# Patient Record
Sex: FEMALE | Race: WHITE | NOT HISPANIC OR LATINO | Employment: UNEMPLOYED | URBAN - METROPOLITAN AREA
[De-identification: names, ages, dates, MRNs, and addresses within clinical notes are randomized per-mention and may not be internally consistent; named-entity substitution may affect disease eponyms.]

---

## 2017-01-18 ENCOUNTER — OFFICE VISIT (OUTPATIENT)
Dept: FAMILY MEDICINE | Facility: CLINIC | Age: 37
End: 2017-01-18
Payer: COMMERCIAL

## 2017-01-18 VITALS
HEART RATE: 76 BPM | OXYGEN SATURATION: 99 % | HEIGHT: 64 IN | SYSTOLIC BLOOD PRESSURE: 104 MMHG | WEIGHT: 168 LBS | BODY MASS INDEX: 28.68 KG/M2 | DIASTOLIC BLOOD PRESSURE: 72 MMHG | RESPIRATION RATE: 16 BRPM

## 2017-01-18 DIAGNOSIS — Z12.4 CERVICAL CANCER SCREENING: ICD-10-CM

## 2017-01-18 DIAGNOSIS — F41.9 ANXIETY: Primary | ICD-10-CM

## 2017-01-18 DIAGNOSIS — F90.0 ADHD (ATTENTION DEFICIT HYPERACTIVITY DISORDER), INATTENTIVE TYPE: ICD-10-CM

## 2017-01-18 PROCEDURE — 99214 OFFICE O/P EST MOD 30 MIN: CPT | Mod: S$GLB,,, | Performed by: FAMILY MEDICINE

## 2017-01-18 PROCEDURE — 99999 PR PBB SHADOW E&M-EST. PATIENT-LVL IV: CPT | Mod: PBBFAC,,, | Performed by: FAMILY MEDICINE

## 2017-01-18 RX ORDER — DEXTROAMPHETAMINE SACCHARATE, AMPHETAMINE ASPARTATE, DEXTROAMPHETAMINE SULFATE AND AMPHETAMINE SULFATE 2.5; 2.5; 2.5; 2.5 MG/1; MG/1; MG/1; MG/1
1 TABLET ORAL 2 TIMES DAILY
Qty: 60 TABLET | Refills: 0 | Status: SHIPPED | OUTPATIENT
Start: 2017-01-18 | End: 2017-01-18 | Stop reason: SDUPTHER

## 2017-01-18 RX ORDER — ETONOGESTREL AND ETHINYL ESTRADIOL VAGINAL RING .015; .12 MG/D; MG/D
1 RING VAGINAL
Qty: 1 EACH | Refills: 6 | Status: SHIPPED | OUTPATIENT
Start: 2017-01-18 | End: 2017-10-16 | Stop reason: SDUPTHER

## 2017-01-18 RX ORDER — DEXTROAMPHETAMINE SACCHARATE, AMPHETAMINE ASPARTATE, DEXTROAMPHETAMINE SULFATE AND AMPHETAMINE SULFATE 2.5; 2.5; 2.5; 2.5 MG/1; MG/1; MG/1; MG/1
1 TABLET ORAL 2 TIMES DAILY
Qty: 60 TABLET | Refills: 0 | Status: SHIPPED | OUTPATIENT
Start: 2017-03-17 | End: 2017-07-14 | Stop reason: SDUPTHER

## 2017-01-18 RX ORDER — DEXTROAMPHETAMINE SACCHARATE, AMPHETAMINE ASPARTATE, DEXTROAMPHETAMINE SULFATE AND AMPHETAMINE SULFATE 2.5; 2.5; 2.5; 2.5 MG/1; MG/1; MG/1; MG/1
1 TABLET ORAL 2 TIMES DAILY
Qty: 60 TABLET | Refills: 0 | Status: SHIPPED | OUTPATIENT
Start: 2017-02-17 | End: 2017-01-18 | Stop reason: SDUPTHER

## 2017-01-18 NOTE — MR AVS SNAPSHOT
[facility]  [address] [place] [address]  Phone: [phone]  Fax: [phone]                  Judie Schmitz   2017 10:40 AM   Office Visit    Description:  Female : 1980   Provider:  Bertha Harrison MD   Department:  [facility]           Reason for Visit     Establish Care     Medication Refill           Diagnoses this Visit        Comments    Anxiety    -  Primary     ADHD (attention deficit hyperactivity disorder), inattentive type         Cervical cancer screening                To Do List           Goals (5 Years of Data)     None      Follow-Up and Disposition     Return in about 3 months (around 2017).       These Medications        Disp Refills Start End    etonogestrel-ethinyl estradiol (NUVARING) 0.12-0.015 mg/24 hr vaginal ring 1 each 6 2017     Place 1 each vaginally every 28 days. - Vaginal    Pharmacy: [facility]/pharmacy #[id number] - [place] - [address] AT [facility] Ph #: [phone]       dextroamphetamine-amphetamine 10 mg Tab 60 tablet 0 3/17/2017     Take 1 tablet by mouth 2 (two) times daily. - Oral    Pharmacy: [facility]/pharmacy #[id number] - [place] - [address] AT [facility] Ph #: [phone]         [facility] On [facility]     [facility] On Call Nurse Care Line -  Assistance  Registered nurses in the [facility] provide clinical advisement, health education, appointment booking, and other advisory services.  Call for this free service at [phone].             Medications           CHANGE how you are taking these medications     Start Taking Instead of    etonogestrel-ethinyl estradiol (NUVARING) 0.12-0.015 mg/24 hr vaginal ring NUVARING 0.12-0.015 mg/24 hr vaginal ring    Dosage:  Place 1 each vaginally every 28 days.     Reason for Change:  Reorder            Verify that the below list of medications is an accurate representation of the medications you are currently  "taking.  If none reported, the list may be blank. If incorrect, please contact your healthcare provider. Carry this list with you in case of emergency.           Current Medications     amitriptyline (ELAVIL) 10 MG tablet Take 10 mg by mouth nightly as needed for Insomnia (1-2 tablets every night).     clonazePAM (KLONOPIN) 0.5 MG tablet Take 0.5 mg by mouth 2 (two) times daily.     dextroamphetamine-amphetamine 10 mg Tab Starting on Mar 17, 2017. Take 1 tablet by mouth 2 (two) times daily.    etonogestrel-ethinyl estradiol (NUVARING) 0.12-0.015 mg/24 hr vaginal ring Place 1 each vaginally every 28 days.           Clinical Reference Information           Vital Signs - Last Recorded  Most recent update: 1/18/2017 10:49 AM by Christi Bardales LPN    BP Pulse Resp Ht Wt SpO2    104/72 (BP Location: Right arm, Patient Position: Sitting, BP Method: Manual) 76 16 5' 4" (1.626 m) 76.2 kg (167 lb 15.9 oz) 99%    BMI                28.84 kg/m2          Blood Pressure          Most Recent Value    BP  104/72      Allergies as of 1/18/2017     No Known Allergies      Immunizations Administered on Date of Encounter - 1/18/2017     None      Orders Placed During Today's Visit      Normal Orders This Visit    Ambulatory referral to Obstetrics / Gynecology       "

## 2017-01-18 NOTE — PROGRESS NOTES
Subjective:       Patient ID: Judie Schmitz is a 36 y.o. female.    Chief Complaint: Establish Care and Medication Refill    HPI 36 year old female here to establish care. She has ADD which was diagnosed at the age of 19 by Dr.Dale Denny. Patient takes adderall 10 mg every morning, and 10 mg in the afternoon. Patient was off of adderall during pregnancy and afterwards, cumulating to 2 years total. Patient finds the medication helps keep her day organized and stick to one task at a time.   Anxiety: patient takes klonopin prn for insomnia and anxiety. Patient had klonopin last filled on 6/29/16. She states her issues with sleeping is staying asleep, not falling asleep. She states when she wakes up in the middle of the night she can be up for 1-2 hours. This is a chronic problem. Klonopin and elavil have helped prn. Patient describes panic attacks that are relieved with klonopin. Patient states her anxiety has decreased and she rarely needs medication.   Patient needs an ob/gyn referral. Previous ob/gyn was  at [facility]. Patient has a history of abnormal pap smears which was monitored and normalized after last pregnancy. Patients contraception is nuvaring.     Review of Systems   Constitutional: Negative for chills and fever.   HENT: Negative for congestion and postnasal drip.    Respiratory: Negative for chest tightness and shortness of breath.    Cardiovascular: Negative for chest pain and leg swelling.   Gastrointestinal: Negative for abdominal pain, diarrhea, nausea and vomiting.   Genitourinary: Negative for dysuria and hematuria.   Musculoskeletal: Negative for arthralgias and back pain.   Neurological: Negative for weakness and headaches.   Psychiatric/Behavioral: Positive for decreased concentration and sleep disturbance. Negative for agitation, behavioral problems, self-injury and suicidal ideas. The patient is nervous/anxious.        Objective:      Vitals:    01/18/17 1047   BP: 104/72   Pulse:  76   Resp: 16     Physical Exam   Constitutional: She is oriented to person, place, and time. She appears well-developed and well-nourished.   HENT:   Head: Normocephalic and atraumatic.   Mouth/Throat: Oropharynx is clear and moist. No oropharyngeal exudate.   Eyes: EOM are normal. Right eye exhibits no discharge. Left eye exhibits no discharge.   Neck: Normal range of motion.   Cardiovascular: Normal rate and regular rhythm.    Pulmonary/Chest: Effort normal. She has no wheezes.   Abdominal: Soft. There is no tenderness. There is no rebound and no guarding.   Musculoskeletal: She exhibits no edema or tenderness.   Lymphadenopathy:     She has no cervical adenopathy.   Neurological: She is alert and oriented to person, place, and time.   Psychiatric: She has a normal mood and affect. Her behavior is normal.   Vitals reviewed.      Assessment:       1. Anxiety    2. ADHD (attention deficit hyperactivity disorder), inattentive type    3. Cervical cancer screening        Plan:         1. ADD: adderall last filled on 8/30/16, patient is compliant. Continue adderall 10 mg twice daily. 3 months filled.   2. Anxiety and insomnia: advised on melatonin to aid with sleep. Proper sleep hygiene reviewed with patient. Daily exercise but no strenuous activity 3 hours before going to sleep. For anxiety, advised on counseling and to avoid klonopin.   3. Screening: referral for ob/gyn placed. nuva ring for OCP. Immunizations: Flu shot and tdap UTD.   4. RTC 3 months.   Anxiety    ADHD (attention deficit hyperactivity disorder), inattentive type  -     Discontinue: dextroamphetamine-amphetamine 10 mg Tab; Take 1 tablet by mouth 2 (two) times daily.  Dispense: 60 tablet; Refill: 0  -     Discontinue: dextroamphetamine-amphetamine 10 mg Tab; Take 1 tablet by mouth 2 (two) times daily.  Dispense: 60 tablet; Refill: 0  -     dextroamphetamine-amphetamine 10 mg Tab; Take 1 tablet by mouth 2 (two) times daily.  Dispense: 60 tablet;  Refill: 0    Cervical cancer screening  -     Ambulatory referral to Obstetrics / Gynecology    Other orders  -     etonogestrel-ethinyl estradiol (NUVARING) 0.12-0.015 mg/24 hr vaginal ring; Place 1 each vaginally every 28 days.  Dispense: 1 each; Refill: 6      Return in about 3 months (around 4/18/2017).

## 2017-07-14 ENCOUNTER — OFFICE VISIT (OUTPATIENT)
Dept: FAMILY MEDICINE | Facility: CLINIC | Age: 37
End: 2017-07-14
Payer: COMMERCIAL

## 2017-07-14 VITALS
WEIGHT: 173.19 LBS | DIASTOLIC BLOOD PRESSURE: 86 MMHG | HEART RATE: 92 BPM | SYSTOLIC BLOOD PRESSURE: 118 MMHG | RESPIRATION RATE: 16 BRPM | OXYGEN SATURATION: 100 % | BODY MASS INDEX: 29.57 KG/M2 | HEIGHT: 64 IN

## 2017-07-14 DIAGNOSIS — R07.81 RIB PAIN ON LEFT SIDE: Primary | ICD-10-CM

## 2017-07-14 DIAGNOSIS — E66.3 OVERWEIGHT (BMI 25.0-29.9): ICD-10-CM

## 2017-07-14 DIAGNOSIS — G47.00 INSOMNIA, UNSPECIFIED TYPE: ICD-10-CM

## 2017-07-14 DIAGNOSIS — F90.0 ADHD (ATTENTION DEFICIT HYPERACTIVITY DISORDER), INATTENTIVE TYPE: ICD-10-CM

## 2017-07-14 PROCEDURE — 99999 PR PBB SHADOW E&M-EST. PATIENT-LVL III: CPT | Mod: PBBFAC,,, | Performed by: FAMILY MEDICINE

## 2017-07-14 PROCEDURE — 99214 OFFICE O/P EST MOD 30 MIN: CPT | Mod: S$GLB,,, | Performed by: FAMILY MEDICINE

## 2017-07-14 RX ORDER — CYCLOBENZAPRINE HCL 10 MG
10 TABLET ORAL NIGHTLY PRN
Qty: 30 TABLET | Refills: 0 | Status: SHIPPED | OUTPATIENT
Start: 2017-07-14 | End: 2017-08-13 | Stop reason: SDUPTHER

## 2017-07-14 RX ORDER — DEXTROAMPHETAMINE SACCHARATE, AMPHETAMINE ASPARTATE, DEXTROAMPHETAMINE SULFATE AND AMPHETAMINE SULFATE 2.5; 2.5; 2.5; 2.5 MG/1; MG/1; MG/1; MG/1
1 TABLET ORAL 2 TIMES DAILY
Qty: 60 TABLET | Refills: 0 | Status: SHIPPED | OUTPATIENT
Start: 2017-09-14 | End: 2017-10-19

## 2017-07-14 RX ORDER — QUETIAPINE FUMARATE 25 MG/1
25 TABLET, FILM COATED ORAL NIGHTLY
Qty: 30 TABLET | Refills: 0 | Status: SHIPPED | OUTPATIENT
Start: 2017-07-14 | End: 2017-08-13 | Stop reason: SDUPTHER

## 2017-07-14 RX ORDER — DEXTROAMPHETAMINE SACCHARATE, AMPHETAMINE ASPARTATE, DEXTROAMPHETAMINE SULFATE AND AMPHETAMINE SULFATE 2.5; 2.5; 2.5; 2.5 MG/1; MG/1; MG/1; MG/1
1 TABLET ORAL 2 TIMES DAILY
Qty: 60 TABLET | Refills: 0 | Status: SHIPPED | OUTPATIENT
Start: 2017-08-14 | End: 2017-07-14 | Stop reason: SDUPTHER

## 2017-07-14 RX ORDER — DEXTROAMPHETAMINE SACCHARATE, AMPHETAMINE ASPARTATE, DEXTROAMPHETAMINE SULFATE AND AMPHETAMINE SULFATE 2.5; 2.5; 2.5; 2.5 MG/1; MG/1; MG/1; MG/1
1 TABLET ORAL 2 TIMES DAILY
Qty: 60 TABLET | Refills: 0 | Status: SHIPPED | OUTPATIENT
Start: 2017-07-14 | End: 2017-07-14 | Stop reason: SDUPTHER

## 2017-07-14 RX ORDER — NAPROXEN 500 MG/1
500 TABLET ORAL 2 TIMES DAILY
Qty: 28 TABLET | Refills: 0 | Status: SHIPPED | OUTPATIENT
Start: 2017-07-14 | End: 2017-07-28

## 2017-07-14 NOTE — PROGRESS NOTES
Subjective:       Patient ID: Judie Schmitz is a 37 y.o. female.    Chief Complaint: Medication Refill and Rib Pain    HPI 37 year old female here for medication refill on adderall and left rib pain suffered on July 4th.   ADHD: patient states symptoms are stable on adderall. She denies new issues with sleep/cp/sob/palptiations. She takes it twice daily when at work and finds she is able to concentrate with her work load, and perform tasks effectively.   Rib pain: patient states she turned to get a btotle from daughter in the car and she felt something pop. since then she has been in 8/10 pain and has been taking ibuprofen 800 mg TID which controls it. She states the pain is not improving. She denies SOB.   Insomnia: chronic. Patient has noticed difficulty staying asleep. She states she is a light sleeper and it takes an hour to go back to sleep. She has tried melatonin, trazodone which were ineffective. Elavil makes her feel drowsy in the am.   Review of Systems   Constitutional: Negative for chills and fever.   Respiratory: Negative for chest tightness and shortness of breath.    Cardiovascular: Positive for chest pain. Negative for leg swelling.   Gastrointestinal: Negative for abdominal pain, diarrhea, nausea and vomiting.   Psychiatric/Behavioral: Negative for decreased concentration.       Objective:      Vitals:    07/14/17 1004   BP: 118/86   Pulse: 92   Resp: 16     Physical Exam   Constitutional: She appears well-developed and well-nourished. No distress.   Cardiovascular: Normal rate and regular rhythm.    Pulmonary/Chest: Effort normal. She has no wheezes. She exhibits tenderness and bony tenderness. She exhibits no crepitus, no edema and no swelling.       Abdominal: Soft. There is no tenderness. There is no guarding.   Skin: She is not diaphoretic.   Vitals reviewed.      Assessment:       1. Rib pain on left side    2. ADHD (attention deficit hyperactivity disorder), inattentive type    3.  Insomnia, unspecified type        Plan:         1. ADHD: refilled 3 months of medication. Patient is stable and compliant with medication  2. Insomnia: counseled on proper sleep hygiene. Will start seroquel.   3. Rib pain: likely contusion vs intercostal muscle strain vs costochondritis. Will start aleve, and flexeril. If symptoms do not improve will get a cxr. Patient to update me on how her symptoms are in 2-3 weeks.   For weight loss, patient advised on decreasing sugar intake and 150 min of exercise/week.     Rib pain on left side    ADHD (attention deficit hyperactivity disorder), inattentive type  -     Discontinue: dextroamphetamine-amphetamine 10 mg Tab; Take 1 tablet by mouth 2 (two) times daily.  Dispense: 60 tablet; Refill: 0  -     Discontinue: dextroamphetamine-amphetamine 10 mg Tab; Take 1 tablet by mouth 2 (two) times daily.  Dispense: 60 tablet; Refill: 0  -     dextroamphetamine-amphetamine 10 mg Tab; Take 1 tablet by mouth 2 (two) times daily.  Dispense: 60 tablet; Refill: 0    Insomnia, unspecified type    Other orders  -     cyclobenzaprine (FLEXERIL) 10 MG tablet; Take 1 tablet (10 mg total) by mouth nightly as needed for Muscle spasms.  Dispense: 30 tablet; Refill: 0  -     quetiapine (SEROQUEL) 25 MG Tab; Take 1 tablet (25 mg total) by mouth every evening.  Dispense: 30 tablet; Refill: 0  -     naproxen (NAPROSYN) 500 MG tablet; Take 1 tablet (500 mg total) by mouth 2 (two) times daily.  Dispense: 28 tablet; Refill: 0      Return in about 3 months (around 10/14/2017).

## 2017-08-14 RX ORDER — QUETIAPINE FUMARATE 25 MG/1
25 TABLET, FILM COATED ORAL NIGHTLY
Qty: 30 TABLET | Refills: 0 | Status: SHIPPED | OUTPATIENT
Start: 2017-08-14 | End: 2017-10-31

## 2017-08-14 RX ORDER — CYCLOBENZAPRINE HCL 10 MG
10 TABLET ORAL NIGHTLY PRN
Qty: 30 TABLET | Refills: 0 | Status: SHIPPED | OUTPATIENT
Start: 2017-08-14 | End: 2017-09-22 | Stop reason: SDUPTHER

## 2017-09-22 RX ORDER — CYCLOBENZAPRINE HCL 10 MG
10 TABLET ORAL NIGHTLY PRN
Qty: 30 TABLET | Refills: 0 | Status: SHIPPED | OUTPATIENT
Start: 2017-09-22 | End: 2017-10-02

## 2017-10-16 RX ORDER — ETONOGESTREL AND ETHINYL ESTRADIOL .12; .015 MG/D; MG/D
INSERT, EXTENDED RELEASE VAGINAL
Qty: 4 EACH | Refills: 6 | Status: SHIPPED | OUTPATIENT
Start: 2017-10-16 | End: 2017-10-31 | Stop reason: SDUPTHER

## 2017-10-19 ENCOUNTER — OFFICE VISIT (OUTPATIENT)
Dept: FAMILY MEDICINE | Facility: CLINIC | Age: 37
End: 2017-10-19
Payer: COMMERCIAL

## 2017-10-19 VITALS
HEIGHT: 64 IN | OXYGEN SATURATION: 99 % | HEART RATE: 85 BPM | WEIGHT: 178.81 LBS | SYSTOLIC BLOOD PRESSURE: 122 MMHG | DIASTOLIC BLOOD PRESSURE: 86 MMHG | BODY MASS INDEX: 30.53 KG/M2

## 2017-10-19 DIAGNOSIS — F90.0 ADHD (ATTENTION DEFICIT HYPERACTIVITY DISORDER), INATTENTIVE TYPE: Primary | ICD-10-CM

## 2017-10-19 DIAGNOSIS — E66.9 OBESITY (BMI 30-39.9): ICD-10-CM

## 2017-10-19 PROCEDURE — 99999 PR PBB SHADOW E&M-EST. PATIENT-LVL III: CPT | Mod: PBBFAC,,, | Performed by: FAMILY MEDICINE

## 2017-10-19 PROCEDURE — 99213 OFFICE O/P EST LOW 20 MIN: CPT | Mod: S$GLB,,, | Performed by: FAMILY MEDICINE

## 2017-10-19 RX ORDER — DEXTROAMPHETAMINE SACCHARATE, AMPHETAMINE ASPARTATE, DEXTROAMPHETAMINE SULFATE AND AMPHETAMINE SULFATE 3.75; 3.75; 3.75; 3.75 MG/1; MG/1; MG/1; MG/1
15 TABLET ORAL 2 TIMES DAILY
Qty: 60 TABLET | Refills: 0 | Status: SHIPPED | OUTPATIENT
Start: 2017-12-20 | End: 2018-02-21 | Stop reason: DRUGHIGH

## 2017-10-19 RX ORDER — DEXTROAMPHETAMINE SACCHARATE, AMPHETAMINE ASPARTATE, DEXTROAMPHETAMINE SULFATE AND AMPHETAMINE SULFATE 3.75; 3.75; 3.75; 3.75 MG/1; MG/1; MG/1; MG/1
15 TABLET ORAL 2 TIMES DAILY
Qty: 60 TABLET | Refills: 0 | Status: SHIPPED | OUTPATIENT
Start: 2017-11-20 | End: 2017-10-19 | Stop reason: SDUPTHER

## 2017-10-19 RX ORDER — DEXTROAMPHETAMINE SACCHARATE, AMPHETAMINE ASPARTATE, DEXTROAMPHETAMINE SULFATE AND AMPHETAMINE SULFATE 3.75; 3.75; 3.75; 3.75 MG/1; MG/1; MG/1; MG/1
15 TABLET ORAL 2 TIMES DAILY
Qty: 60 TABLET | Refills: 0 | Status: SHIPPED | OUTPATIENT
Start: 2017-10-19 | End: 2017-10-19 | Stop reason: SDUPTHER

## 2017-10-19 NOTE — PROGRESS NOTES
Subjective:       Patient ID: Judie Schmitz is a 37 y.o. female.    Chief Complaint: Medication Refill    HPI 37 year old female here for adderall refills. Patient states she has noticed the 10 mg twice a day has not been effective in helping her perform her duties at her job effectively. She has tried taking 15 mg in the am and 10 in the afternoon which has been effective.  She denies issues with sleeping, palpitations, chest pains.   Review of Systems   Constitutional: Negative for chills and fever.   Respiratory: Negative for chest tightness and shortness of breath.    Cardiovascular: Negative for chest pain, palpitations and leg swelling.   Gastrointestinal: Negative for abdominal pain, diarrhea, nausea and vomiting.   Genitourinary: Negative for dysuria and menstrual problem.   Psychiatric/Behavioral: Negative for sleep disturbance.       Objective:      Vitals:    10/19/17 0810   BP: 122/86   Pulse: 85     Physical Exam   Constitutional: She is oriented to person, place, and time. She appears well-developed and well-nourished. No distress.   Cardiovascular: Normal rate and regular rhythm.    Pulmonary/Chest: Effort normal. She has no wheezes.   Abdominal: Soft. There is no tenderness. There is no guarding.   Neurological: She is alert and oriented to person, place, and time.   Skin: She is not diaphoretic.   Psychiatric: She has a normal mood and affect. Her behavior is normal. Judgment and thought content normal.   Vitals reviewed.      Assessment:       1. ADHD (attention deficit hyperactivity disorder), inattentive type    2. Obesity (BMI 30-39.9)        Plan:         1. ADHD: will increase adderall to 15 mg Twice daily. Patient was advised to take lowest effective dose to control her symptoms of decreased attention span and disorganization.   2. Screening: pap per gyn in 11/2017. Flu shot given at work  3. Obesity: patient advised on calorie counting and continuing daily exercise.     ADHD (attention  deficit hyperactivity disorder), inattentive type    Obesity (BMI 30-39.9)    Other orders  -     Discontinue: dextroamphetamine-amphetamine (ADDERALL) 15 mg tablet; Take 1 tablet (15 mg total) by mouth 2 (two) times daily.  Dispense: 60 tablet; Refill: 0  -     Discontinue: dextroamphetamine-amphetamine (ADDERALL) 15 mg tablet; Take 1 tablet (15 mg total) by mouth 2 (two) times daily.  Dispense: 60 tablet; Refill: 0  -     dextroamphetamine-amphetamine (ADDERALL) 15 mg tablet; Take 1 tablet (15 mg total) by mouth 2 (two) times daily.  Dispense: 60 tablet; Refill: 0      Return in about 3 months (around 1/19/2018).

## 2017-10-31 ENCOUNTER — OFFICE VISIT (OUTPATIENT)
Dept: OBSTETRICS AND GYNECOLOGY | Facility: CLINIC | Age: 37
End: 2017-10-31
Payer: COMMERCIAL

## 2017-10-31 VITALS
DIASTOLIC BLOOD PRESSURE: 64 MMHG | HEIGHT: 64 IN | WEIGHT: 180 LBS | SYSTOLIC BLOOD PRESSURE: 118 MMHG | BODY MASS INDEX: 30.73 KG/M2

## 2017-10-31 DIAGNOSIS — Z01.419 ENCOUNTER FOR GYNECOLOGICAL EXAMINATION WITHOUT ABNORMAL FINDING: Primary | ICD-10-CM

## 2017-10-31 DIAGNOSIS — Z12.4 CERVICAL CANCER SCREENING: ICD-10-CM

## 2017-10-31 DIAGNOSIS — Z30.44 ENCOUNTER FOR SURVEILLANCE OF VAGINAL RING HORMONAL CONTRACEPTIVE DEVICE: ICD-10-CM

## 2017-10-31 PROCEDURE — 88175 CYTOPATH C/V AUTO FLUID REDO: CPT

## 2017-10-31 PROCEDURE — 99385 PREV VISIT NEW AGE 18-39: CPT | Mod: S$GLB,,, | Performed by: OBSTETRICS & GYNECOLOGY

## 2017-10-31 PROCEDURE — 99999 PR PBB SHADOW E&M-EST. PATIENT-LVL III: CPT | Mod: PBBFAC,,, | Performed by: OBSTETRICS & GYNECOLOGY

## 2017-10-31 RX ORDER — ETONOGESTREL AND ETHINYL ESTRADIOL VAGINAL RING .015; .12 MG/D; MG/D
RING VAGINAL
Qty: 3 EACH | Refills: 3 | Status: SHIPPED | OUTPATIENT
Start: 2017-10-31 | End: 2019-05-29 | Stop reason: SDUPTHER

## 2017-10-31 NOTE — PROGRESS NOTES
Subjective:       Patient ID: Judie Schmitz is a 37 y.o. female.    Chief Complaint:  Well Woman      History of Present Illness  Patient presents for annual exam.  Patient is using NuvaRing for birth control and would like to continue.  She is without GYN complaints.    Menstrual History:  OB History      Para Term  AB Living    2 2       2    SAB TAB Ectopic Multiple Live Births                      Menarche age:   Patient's last menstrual period was 2017 (exact date).         Review of Systems  Review of Systems   Constitutional: Negative for activity change, appetite change, chills, diaphoresis, fatigue, fever and unexpected weight change.   HENT: Negative for congestion, dental problem, drooling, ear discharge, ear pain, facial swelling, hearing loss, mouth sores, nosebleeds, postnasal drip, rhinorrhea, sinus pressure, sneezing, sore throat, tinnitus, trouble swallowing and voice change.    Eyes: Negative for photophobia, pain, discharge, redness, itching and visual disturbance.   Respiratory: Negative for apnea, cough, choking, chest tightness, shortness of breath, wheezing and stridor.    Cardiovascular: Negative for chest pain, palpitations and leg swelling.   Gastrointestinal: Negative for abdominal distention, abdominal pain, anal bleeding, blood in stool, constipation, diarrhea, nausea, rectal pain and vomiting.   Endocrine: Negative for cold intolerance, heat intolerance, polydipsia, polyphagia and polyuria.   Genitourinary: Negative for decreased urine volume, difficulty urinating, dyspareunia, dysuria, enuresis, flank pain, frequency, genital sores, hematuria, menstrual problem, pelvic pain, urgency, vaginal bleeding, vaginal discharge and vaginal pain.   Musculoskeletal: Negative for arthralgias, back pain, gait problem, joint swelling, myalgias, neck pain and neck stiffness.   Skin: Negative for color change, pallor, rash and wound.   Allergic/Immunologic: Negative for  environmental allergies, food allergies and immunocompromised state.   Neurological: Negative for dizziness, tremors, seizures, syncope, facial asymmetry, speech difficulty, weakness, light-headedness, numbness and headaches.   Hematological: Negative for adenopathy. Does not bruise/bleed easily.   Psychiatric/Behavioral: Negative for agitation, behavioral problems, confusion, decreased concentration, dysphoric mood, hallucinations, self-injury, sleep disturbance and suicidal ideas. The patient is not nervous/anxious and is not hyperactive.            Objective:    Physical Exam   Constitutional: She is oriented to person, place, and time. She appears well-developed and well-nourished.   Neck: No thyromegaly present.   Cardiovascular: Normal rate and regular rhythm.    Pulmonary/Chest: Effort normal and breath sounds normal. Right breast exhibits no inverted nipple, no mass, no nipple discharge, no skin change and no tenderness. Left breast exhibits no inverted nipple, no mass, no nipple discharge, no skin change and no tenderness. Breasts are symmetrical.   Abdominal: Soft. Bowel sounds are normal. She exhibits no mass. There is no tenderness. Hernia confirmed negative in the right inguinal area and confirmed negative in the left inguinal area.   Genitourinary: Vagina normal and uterus normal. Rectal exam shows no external hemorrhoid. No breast tenderness or discharge. Uterus is not enlarged and not tender. Cervix exhibits no motion tenderness, no discharge and no friability. Right adnexum displays no mass, no tenderness and no fullness. Left adnexum displays no mass, no tenderness and no fullness. No tenderness in the vagina. No foreign body in the vagina. No vaginal discharge found.   Musculoskeletal: Normal range of motion.   Lymphadenopathy:        Right: No inguinal adenopathy present.        Left: No inguinal adenopathy present.   Neurological: She is alert and oriented to person, place, and time. She has  normal reflexes.   Skin: Skin is dry.   Psychiatric: She has a normal mood and affect. Her behavior is normal. Judgment and thought content normal.   Nursing note and vitals reviewed.        Assessment:        1. Cervical cancer screening    2. Encounter for gynecological examination without abnormal finding    3. Encounter for surveillance of vaginal ring hormonal contraceptive device               Plan:        Judie was seen today for well woman.    Diagnoses and all orders for this visit:    Cervical cancer screening  -     Liquid-based pap smear, screening    Encounter for gynecological examination without abnormal finding    Encounter for surveillance of vaginal ring hormonal contraceptive device

## 2017-11-15 ENCOUNTER — PATIENT MESSAGE (OUTPATIENT)
Dept: FAMILY MEDICINE | Facility: CLINIC | Age: 37
End: 2017-11-15

## 2017-11-16 DIAGNOSIS — Z13.220 SCREENING FOR LIPID DISORDERS: ICD-10-CM

## 2017-11-16 DIAGNOSIS — R63.5 WEIGHT GAIN: ICD-10-CM

## 2017-11-16 DIAGNOSIS — R53.83 FATIGUE, UNSPECIFIED TYPE: Primary | ICD-10-CM

## 2018-02-14 DIAGNOSIS — Z13.0 SCREENING FOR DEFICIENCY ANEMIA: Primary | ICD-10-CM

## 2018-02-14 DIAGNOSIS — Z13.1 DIABETES MELLITUS SCREENING: ICD-10-CM

## 2018-02-14 DIAGNOSIS — Z13.220 SCREENING CHOLESTEROL LEVEL: ICD-10-CM

## 2018-02-14 DIAGNOSIS — Z13.29 THYROID DISORDER SCREEN: ICD-10-CM

## 2018-02-19 PROBLEM — Z13.220 SCREENING FOR LIPID DISORDERS: Status: RESOLVED | Noted: 2017-11-16 | Resolved: 2018-02-19

## 2018-02-21 ENCOUNTER — OFFICE VISIT (OUTPATIENT)
Dept: FAMILY MEDICINE | Facility: CLINIC | Age: 38
End: 2018-02-21
Payer: COMMERCIAL

## 2018-02-21 VITALS
BODY MASS INDEX: 31.05 KG/M2 | SYSTOLIC BLOOD PRESSURE: 120 MMHG | DIASTOLIC BLOOD PRESSURE: 76 MMHG | TEMPERATURE: 98 F | HEART RATE: 88 BPM | HEIGHT: 64 IN | OXYGEN SATURATION: 100 % | WEIGHT: 181.88 LBS

## 2018-02-21 DIAGNOSIS — Z00.00 ANNUAL PHYSICAL EXAM: Primary | ICD-10-CM

## 2018-02-21 DIAGNOSIS — F90.0 ADHD (ATTENTION DEFICIT HYPERACTIVITY DISORDER), INATTENTIVE TYPE: ICD-10-CM

## 2018-02-21 DIAGNOSIS — G47.00 INSOMNIA, UNSPECIFIED TYPE: ICD-10-CM

## 2018-02-21 DIAGNOSIS — M54.50 CHRONIC MIDLINE LOW BACK PAIN WITHOUT SCIATICA: ICD-10-CM

## 2018-02-21 DIAGNOSIS — G89.29 CHRONIC MIDLINE LOW BACK PAIN WITHOUT SCIATICA: ICD-10-CM

## 2018-02-21 PROBLEM — R07.81 RIB PAIN ON LEFT SIDE: Status: RESOLVED | Noted: 2017-07-14 | Resolved: 2018-02-21

## 2018-02-21 PROCEDURE — 99999 PR PBB SHADOW E&M-EST. PATIENT-LVL IV: CPT | Mod: PBBFAC,,, | Performed by: NURSE PRACTITIONER

## 2018-02-21 PROCEDURE — 99395 PREV VISIT EST AGE 18-39: CPT | Mod: S$GLB,,, | Performed by: NURSE PRACTITIONER

## 2018-02-21 RX ORDER — ETODOLAC 400 MG/1
400 TABLET, FILM COATED ORAL 2 TIMES DAILY
Qty: 60 TABLET | Refills: 2 | Status: SHIPPED | OUTPATIENT
Start: 2018-02-21 | End: 2018-02-23

## 2018-02-21 RX ORDER — DEXTROAMPHETAMINE SACCHARATE, AMPHETAMINE ASPARTATE, DEXTROAMPHETAMINE SULFATE AND AMPHETAMINE SULFATE 5; 5; 5; 5 MG/1; MG/1; MG/1; MG/1
TABLET ORAL
Qty: 45 TABLET | Refills: 0 | Status: SHIPPED | OUTPATIENT
Start: 2018-02-21 | End: 2018-03-22 | Stop reason: SDUPTHER

## 2018-02-21 RX ORDER — CYCLOBENZAPRINE HCL 10 MG
10 TABLET ORAL 3 TIMES DAILY PRN
COMMUNITY
End: 2018-02-22 | Stop reason: SDUPTHER

## 2018-02-22 ENCOUNTER — PATIENT MESSAGE (OUTPATIENT)
Dept: FAMILY MEDICINE | Facility: CLINIC | Age: 38
End: 2018-02-22

## 2018-02-22 RX ORDER — CYCLOBENZAPRINE HCL 10 MG
10 TABLET ORAL 3 TIMES DAILY PRN
Qty: 90 TABLET | Refills: 1 | Status: SHIPPED | OUTPATIENT
Start: 2018-02-22 | End: 2019-11-05

## 2018-02-22 RX ORDER — CLONAZEPAM 0.5 MG/1
0.5 TABLET ORAL DAILY PRN
Qty: 30 TABLET | Refills: 1 | Status: SHIPPED | OUTPATIENT
Start: 2018-02-22 | End: 2018-08-23 | Stop reason: SDUPTHER

## 2018-02-23 ENCOUNTER — PATIENT MESSAGE (OUTPATIENT)
Dept: FAMILY MEDICINE | Facility: CLINIC | Age: 38
End: 2018-02-23

## 2018-02-23 RX ORDER — DICLOFENAC SODIUM 50 MG/1
50 TABLET, DELAYED RELEASE ORAL 2 TIMES DAILY
Qty: 60 TABLET | Refills: 1 | Status: SHIPPED | OUTPATIENT
Start: 2018-02-23 | End: 2019-04-10 | Stop reason: ALTCHOICE

## 2018-03-22 ENCOUNTER — OFFICE VISIT (OUTPATIENT)
Dept: FAMILY MEDICINE | Facility: CLINIC | Age: 38
End: 2018-03-22
Payer: COMMERCIAL

## 2018-03-22 VITALS
OXYGEN SATURATION: 99 % | HEART RATE: 85 BPM | SYSTOLIC BLOOD PRESSURE: 114 MMHG | WEIGHT: 176.81 LBS | HEIGHT: 64 IN | BODY MASS INDEX: 30.19 KG/M2 | TEMPERATURE: 98 F | DIASTOLIC BLOOD PRESSURE: 80 MMHG

## 2018-03-22 DIAGNOSIS — R61 EXCESSIVE SWEATING: ICD-10-CM

## 2018-03-22 DIAGNOSIS — M54.50 CHRONIC MIDLINE LOW BACK PAIN WITHOUT SCIATICA: ICD-10-CM

## 2018-03-22 DIAGNOSIS — G47.00 INSOMNIA, UNSPECIFIED TYPE: ICD-10-CM

## 2018-03-22 DIAGNOSIS — M25.562 POSTERIOR LEFT KNEE PAIN: ICD-10-CM

## 2018-03-22 DIAGNOSIS — G89.29 CHRONIC MIDLINE LOW BACK PAIN WITHOUT SCIATICA: ICD-10-CM

## 2018-03-22 DIAGNOSIS — F90.0 ADHD (ATTENTION DEFICIT HYPERACTIVITY DISORDER), INATTENTIVE TYPE: Primary | ICD-10-CM

## 2018-03-22 PROCEDURE — 99999 PR PBB SHADOW E&M-EST. PATIENT-LVL V: CPT | Mod: PBBFAC,,, | Performed by: NURSE PRACTITIONER

## 2018-03-22 PROCEDURE — 99214 OFFICE O/P EST MOD 30 MIN: CPT | Mod: S$GLB,,, | Performed by: NURSE PRACTITIONER

## 2018-03-22 RX ORDER — DEXTROAMPHETAMINE SACCHARATE, AMPHETAMINE ASPARTATE, DEXTROAMPHETAMINE SULFATE AND AMPHETAMINE SULFATE 5; 5; 5; 5 MG/1; MG/1; MG/1; MG/1
1 TABLET ORAL 2 TIMES DAILY
Qty: 60 TABLET | Refills: 0 | Status: SHIPPED | OUTPATIENT
Start: 2018-03-22 | End: 2018-05-01 | Stop reason: SDUPTHER

## 2018-03-22 NOTE — PROGRESS NOTES
Subjective:       Patient ID: Judie Schmitz is a 37 y.o. female.    Chief Complaint: Follow-up (medication) and Knee Pain (Lt knee started 6 months ago)    Patient is here today for follow-up.  Patient has ADHD.  As last visit patient was put on Adderall 20 mg in the morning and 10 mg midday.  She reports to 10 mg in the afternoon is ineffective.  Patient works as a medical assistant podiatrist.    Patient has chronic intermittent low back pain she reports the back pain occurs mostly in the evening after being on her feet all day.  She reports she does get some relief with the diclofenac and Flexeril.    Patient has insomnia and takes clonazepam only as needed reports on average once or twice a week.    Patient complains of posterior knee pain.  She reports multiple months ago she was working out at gym where  On your stomach and he pulled legs up with a weight to build your hamstring muscles.  She reports she had a pain behind the knee with this exercise and has hurt on and off since when doing squats.  Patient also reports she fell during Deep Gras and landed on her patella and since then unable to kneel on the knee without pain.  Patient reports she has done hamstring stretches and multiple exercises without any relief.  She would like a referral to an orthopedic M.D.        Previous Medications    CLONAZEPAM (KLONOPIN) 0.5 MG TABLET    Take 1 tablet (0.5 mg total) by mouth daily as needed for Anxiety (insomnia).    CYCLOBENZAPRINE (FLEXERIL) 10 MG TABLET    Take 1 tablet (10 mg total) by mouth 3 (three) times daily as needed for Muscle spasms.    DEXTROAMPHETAMINE-AMPHETAMINE (ADDERALL) 20 MG TABLET    Take 1 tablet in AM and 1/2 tablet mid-day    DICLOFENAC (VOLTAREN) 50 MG EC TABLET    Take 1 tablet (50 mg total) by mouth 2 (two) times daily.    ETONOGESTREL-ETHINYL ESTRADIOL (NUVARING) 0.12-0.015 MG/24 HR VAGINAL RING    PLACE 1 RING VAGINALLY EVERY 28 DAYS       Past Medical History:   Diagnosis  Date    Abnormal Pap smear of cervix age 34    no treatment    ADHD (attention deficit hyperactivity disorder), inattentive type 2007    diagnosed by Dr. Denny - taking Adderall 10 mg twice daily from 2007 to  - increased Adderall to 20 mg twice daily until  - patient quit school and got off me - started back on Adderall 10 in  but then off med when had baby in  - has not been on med since having baby in .    Anxiety and depression     IBS (irritable bowel syndrome)     Insomnia        Past Surgical History:   Procedure Laterality Date     SECTION      x2 2002-10/21/2013       Family History   Problem Relation Age of Onset    Hypertension Mother     COPD Mother 50    Rheum arthritis Mother     Colon polyps Father     Stroke Maternal Grandmother     Pneumonia Maternal Grandmother      passed age 88    Cancer Maternal Grandfather      brain tumor dont know what age he passed    Suicide Paternal Grandfather      passed in his 50's    No Known Problems Sister     Montoya syndrome Sister     Heart disease Sister     Thyroid disease Sister     Breast cancer Paternal Aunt     Colon cancer Neg Hx     Ovarian cancer Neg Hx        Social History     Social History    Marital status:      Spouse name: N/A    Number of children: 2    Years of education: N/A     Occupational History    Medical Assistant      Social History Main Topics    Smoking status: Never Smoker    Smokeless tobacco: Never Used    Alcohol use Yes      Comment: occasional    Drug use: No    Sexual activity: Yes     Partners: Male     Birth control/ protection: Inserts      Comment:      Other Topics Concern    None     Social History Narrative    Lives in [place].        Review of Systems   Constitutional: Negative for appetite change, chills, fatigue, fever and unexpected weight change.   HENT: Negative for congestion, ear pain, mouth sores, nosebleeds, postnasal drip,  "rhinorrhea, sinus pressure, sneezing, sore throat, trouble swallowing and voice change.    Eyes: Negative for photophobia, pain, discharge, redness, itching and visual disturbance.   Respiratory: Negative for cough, chest tightness and shortness of breath.    Cardiovascular: Negative for chest pain, palpitations and leg swelling.   Gastrointestinal: Negative for abdominal pain, blood in stool, constipation, diarrhea, nausea and vomiting.   Genitourinary: Negative for dysuria, frequency, hematuria and urgency.   Musculoskeletal: Positive for arthralgias, back pain and myalgias. Negative for joint swelling.   Skin: Negative for color change and rash.   Allergic/Immunologic: Negative for immunocompromised state.   Neurological: Negative for dizziness, seizures, syncope, weakness and headaches.   Hematological: Negative for adenopathy. Does not bruise/bleed easily.   Psychiatric/Behavioral: Positive for decreased concentration. Negative for agitation, dysphoric mood, sleep disturbance and suicidal ideas. The patient is not nervous/anxious.          Objective:     Vitals:    03/22/18 1553   BP: 114/80   BP Location: Right arm   Patient Position: Sitting   BP Method: Medium (Manual)   Pulse: 85   Temp: 98 °F (36.7 °C)   TempSrc: Oral   SpO2: 99%   Weight: 80.2 kg (176 lb 12.8 oz)   Height: 5' 4" (1.626 m)          Physical Exam   Constitutional: She is oriented to person, place, and time. She appears well-developed and well-nourished.   Body mass index is 30.35 kg/m².     HENT:   Head: Normocephalic and atraumatic.   Right Ear: External ear normal.   Left Ear: External ear normal.   Nose: Nose normal.   Mouth/Throat: Oropharynx is clear and moist. No oropharyngeal exudate.   Eyes: EOM are normal. Pupils are equal, round, and reactive to light.   Neck: Normal range of motion. Neck supple. No tracheal deviation present. No thyromegaly present.   Cardiovascular: Normal rate, regular rhythm and normal heart sounds.    No " murmur heard.  Pulmonary/Chest: Effort normal and breath sounds normal. No respiratory distress.   Abdominal: Soft. She exhibits no distension.   Musculoskeletal: Normal range of motion. She exhibits no edema.        Left knee: She exhibits normal range of motion, no swelling, no effusion, no ecchymosis, no deformity, no erythema and no bony tenderness.        Lumbar back: She exhibits pain and spasm. She exhibits no swelling, no edema and no deformity.        Back:         Legs:  Negative SLRs.  Positive DTRs.  Neurologically intact.    Left knee with normal exam.  Suspect hamstring strain but due to worsening pain- will refer to rule out tear.   Lymphadenopathy:     She has no cervical adenopathy.   Neurological: She is alert and oriented to person, place, and time. No cranial nerve deficit. Coordination normal.   Skin: Skin is warm and dry. No rash noted.   Psychiatric: She has a normal mood and affect.         Assessment:         ICD-10-CM ICD-9-CM   1. ADHD (attention deficit hyperactivity disorder), inattentive type F90.0 314.00   2. Chronic midline low back pain without sciatica M54.5 724.2    G89.29 338.29   3. Insomnia, unspecified type G47.00 780.52   4. Excessive sweating R61 780.8   5. Posterior left knee pain M25.562 719.46       Plan:       ADHD (attention deficit hyperactivity disorder), inattentive type  -  Increase Adderall to 20 mg twice daily follow-up in 4 weeks  -     dextroamphetamine-amphetamine (ADDERALL) 20 mg tablet; Take 1 tablet by mouth 2 (two) times daily.  Dispense: 60 tablet; Refill: 0    Chronic midline low back pain without sciatica  -  Advised patient since the back pain is happening mostly towards end of the day after being on feet, Take the diclofenac at lunch time and take the Flexeril when get home.    Insomnia, unspecified type  -  Advised patient since taking the Flexeril in the evening, try to not take any clonazepam unless absolutely needed.    Excessive sweating  -  Apply  as directed  -     aluminum chloride (DRYSOL DAB-O-MATIC) 20 % external solution; Apply topically every evening.  Dispense: 60 mL; Refill: 0    Posterior left knee pain  -  Refer to orthopedic per patient request.  -     Ambulatory Referral to Orthopedics      Follow-up in about 4 weeks (around 4/19/2018) for ADHD check.     Patient's Medications   New Prescriptions    ALUMINUM CHLORIDE (DRYSOL DAB-O-MATIC) 20 % EXTERNAL SOLUTION    Apply topically every evening.   Previous Medications    CLONAZEPAM (KLONOPIN) 0.5 MG TABLET    Take 1 tablet (0.5 mg total) by mouth daily as needed for Anxiety (insomnia).    CYCLOBENZAPRINE (FLEXERIL) 10 MG TABLET    Take 1 tablet (10 mg total) by mouth 3 (three) times daily as needed for Muscle spasms.    DICLOFENAC (VOLTAREN) 50 MG EC TABLET    Take 1 tablet (50 mg total) by mouth 2 (two) times daily.    ETONOGESTREL-ETHINYL ESTRADIOL (NUVARING) 0.12-0.015 MG/24 HR VAGINAL RING    PLACE 1 RING VAGINALLY EVERY 28 DAYS   Modified Medications    Modified Medication Previous Medication    DEXTROAMPHETAMINE-AMPHETAMINE (ADDERALL) 20 MG TABLET dextroamphetamine-amphetamine (ADDERALL) 20 mg tablet       Take 1 tablet by mouth 2 (two) times daily.    Take 1 tablet in AM and 1/2 tablet mid-day   Discontinued Medications    No medications on file

## 2018-03-26 DIAGNOSIS — M25.562 LEFT KNEE PAIN, UNSPECIFIED CHRONICITY: Primary | ICD-10-CM

## 2018-03-27 ENCOUNTER — OFFICE VISIT (OUTPATIENT)
Dept: ORTHOPEDICS | Facility: CLINIC | Age: 38
End: 2018-03-27
Payer: COMMERCIAL

## 2018-03-27 VITALS
DIASTOLIC BLOOD PRESSURE: 82 MMHG | HEIGHT: 64 IN | BODY MASS INDEX: 30.13 KG/M2 | WEIGHT: 176.5 LBS | SYSTOLIC BLOOD PRESSURE: 108 MMHG

## 2018-03-27 DIAGNOSIS — G89.29 CHRONIC PAIN OF LEFT KNEE: Primary | ICD-10-CM

## 2018-03-27 DIAGNOSIS — M25.562 CHRONIC PAIN OF LEFT KNEE: Primary | ICD-10-CM

## 2018-03-27 PROCEDURE — 99203 OFFICE O/P NEW LOW 30 MIN: CPT | Mod: S$GLB,,, | Performed by: ORTHOPAEDIC SURGERY

## 2018-03-27 PROCEDURE — 99999 PR PBB SHADOW E&M-EST. PATIENT-LVL III: CPT | Mod: PBBFAC,,, | Performed by: ORTHOPAEDIC SURGERY

## 2018-03-27 NOTE — PROGRESS NOTES
Subjective:      Patient ID: Judie Schmitz is a 37 y.o. female.    Chief Complaint: Injury and Pain of the Left Knee    HPI     They have experienced problems with their left knee over the past 6 months. The patient reports relevant history of injury/aggravation.  The patient experienced sudden onset of posterior discomfort while doing hamstring curls.  Pain is located Posteriorly Associated symptoms include NA. They have been treated with NSAIDS.   Symptoms have recently stayed the same. Ambulation reportedly has not been impaired. Self care ADLs are not painful.     Review of Systems   Constitution: Negative for fever and weight loss.   HENT: Negative for congestion.    Eyes: Negative for visual disturbance.   Cardiovascular: Negative for chest pain.   Respiratory: Negative for shortness of breath.    Hematologic/Lymphatic: Negative for bleeding problem. Does not bruise/bleed easily.   Skin: Negative for poor wound healing.   Musculoskeletal: Positive for joint pain.   Gastrointestinal: Negative for abdominal pain.   Genitourinary: Negative for dysuria.   Neurological: Negative for seizures.   Psychiatric/Behavioral: Negative for altered mental status.   Allergic/Immunologic: Negative for persistent infections.         Objective:            Ortho/SPM Exam    Left knee    Vitals:    03/27/18 1025   BP: 108/82     The patient is not in acute distress.   Body habitus is normal.   The patient walks without a limp.  Resisted SLR negative.   The skin over the knee is intact.  Knee effusion none   Tendernes is located absent  Range of motion- Full  Ligament exam:   MCL intact   Lachman intact              Post sag intact    LCL intact  Patellar apprehension negative.  Popliteal cyst negative  Patellar crepitation absent.  Flexion/pinch negative.  Pulses DP present, PT present.  Motor normal 5/5 strength in all tested muscle groups.   Sensory normal          Assessment:       Encounter Diagnosis   Name Primary?     Chronic pain of left knee Yes          Capsular strain versus meniscal tear  Plan:       Judie was seen today for injury and pain.    Diagnoses and all orders for this visit:    Chronic pain of left knee        I explained my diagnostic impression and the reasoning behind it in detail, using layman's terms.  Models and/or pictures were used to help in the explanation.    Activity modification explained  Physical therapy  Continue NSAID  MRI does not better

## 2018-04-05 PROBLEM — G89.29 CHRONIC PAIN OF LEFT KNEE: Status: ACTIVE | Noted: 2018-04-05

## 2018-04-05 PROBLEM — M25.562 ACUTE PAIN OF LEFT KNEE: Status: ACTIVE | Noted: 2018-04-05

## 2018-05-01 ENCOUNTER — OFFICE VISIT (OUTPATIENT)
Dept: FAMILY MEDICINE | Facility: CLINIC | Age: 38
End: 2018-05-01
Payer: COMMERCIAL

## 2018-05-01 VITALS
BODY MASS INDEX: 29.48 KG/M2 | SYSTOLIC BLOOD PRESSURE: 124 MMHG | OXYGEN SATURATION: 100 % | WEIGHT: 172.69 LBS | HEIGHT: 64 IN | DIASTOLIC BLOOD PRESSURE: 86 MMHG | HEART RATE: 97 BPM | TEMPERATURE: 99 F

## 2018-05-01 DIAGNOSIS — F90.0 ADHD (ATTENTION DEFICIT HYPERACTIVITY DISORDER), INATTENTIVE TYPE: Primary | ICD-10-CM

## 2018-05-01 PROCEDURE — 99213 OFFICE O/P EST LOW 20 MIN: CPT | Mod: S$GLB,,, | Performed by: NURSE PRACTITIONER

## 2018-05-01 PROCEDURE — 99999 PR PBB SHADOW E&M-EST. PATIENT-LVL IV: CPT | Mod: PBBFAC,,, | Performed by: NURSE PRACTITIONER

## 2018-05-01 RX ORDER — DEXTROAMPHETAMINE SACCHARATE, AMPHETAMINE ASPARTATE, DEXTROAMPHETAMINE SULFATE AND AMPHETAMINE SULFATE 5; 5; 5; 5 MG/1; MG/1; MG/1; MG/1
1 TABLET ORAL 2 TIMES DAILY
Qty: 60 TABLET | Refills: 0 | Status: SHIPPED | OUTPATIENT
Start: 2018-05-31 | End: 2018-08-03 | Stop reason: SDUPTHER

## 2018-05-01 RX ORDER — DEXTROAMPHETAMINE SACCHARATE, AMPHETAMINE ASPARTATE, DEXTROAMPHETAMINE SULFATE AND AMPHETAMINE SULFATE 5; 5; 5; 5 MG/1; MG/1; MG/1; MG/1
1 TABLET ORAL 2 TIMES DAILY
Qty: 60 TABLET | Refills: 0 | Status: SHIPPED | OUTPATIENT
Start: 2018-05-01 | End: 2018-05-01 | Stop reason: SDUPTHER

## 2018-05-01 RX ORDER — DEXTROAMPHETAMINE SACCHARATE, AMPHETAMINE ASPARTATE, DEXTROAMPHETAMINE SULFATE AND AMPHETAMINE SULFATE 5; 5; 5; 5 MG/1; MG/1; MG/1; MG/1
1 TABLET ORAL 2 TIMES DAILY
Qty: 60 TABLET | Refills: 0 | Status: SHIPPED | OUTPATIENT
Start: 2018-06-29 | End: 2018-08-03 | Stop reason: SDUPTHER

## 2018-05-01 RX ORDER — DEXTROAMPHETAMINE SACCHARATE, AMPHETAMINE ASPARTATE, DEXTROAMPHETAMINE SULFATE AND AMPHETAMINE SULFATE 5; 5; 5; 5 MG/1; MG/1; MG/1; MG/1
1 TABLET ORAL 2 TIMES DAILY
Qty: 60 TABLET | Refills: 0 | Status: SHIPPED | OUTPATIENT
Start: 2018-05-01 | End: 2018-08-03 | Stop reason: SDUPTHER

## 2018-05-01 NOTE — PROGRESS NOTES
Subjective:       Patient ID: Judie Schmitz is a 37 y.o. female.    Chief Complaint: Medication Refill    Patient is here today for follow-up.  Patient has ADHD.  As last visit patient was put on Adderall 20 mg twice daily as the 10 mg in the afternoon was ineffective.  Patient reports the increased dose is working well without side effects.  Patient works as a medical assistant podiatrist.        Previous Medications    ALUMINUM CHLORIDE (DRYSOL DAB-O-MATIC) 20 % EXTERNAL SOLUTION    Apply topically every evening.    CLONAZEPAM (KLONOPIN) 0.5 MG TABLET    Take 1 tablet (0.5 mg total) by mouth daily as needed for Anxiety (insomnia).    CYCLOBENZAPRINE (FLEXERIL) 10 MG TABLET    Take 1 tablet (10 mg total) by mouth 3 (three) times daily as needed for Muscle spasms.    DEXTROAMPHETAMINE-AMPHETAMINE (ADDERALL) 20 MG TABLET    Take 1 tablet by mouth 2 (two) times daily.    DICLOFENAC (VOLTAREN) 50 MG EC TABLET    Take 1 tablet (50 mg total) by mouth 2 (two) times daily.    ETONOGESTREL-ETHINYL ESTRADIOL (NUVARING) 0.12-0.015 MG/24 HR VAGINAL RING    PLACE 1 RING VAGINALLY EVERY 28 DAYS       Past Medical History:   Diagnosis Date    Abnormal Pap smear of cervix age 34    no treatment    ADHD (attention deficit hyperactivity disorder), inattentive type 2007    diagnosed by Dr. Denny - taking Adderall 10 mg twice daily from 2007 to  - increased Adderall to 20 mg twice daily until  - patient quit school and got off me - started back on Adderall 10 in  but then off med when had baby in  - has not been on med since having baby in .    Anxiety and depression     IBS (irritable bowel syndrome)     Insomnia        Past Surgical History:   Procedure Laterality Date     SECTION      x2 2002-10/21/2013       Family History   Problem Relation Age of Onset    Hypertension Mother     COPD Mother 50    Rheum arthritis Mother     Colon polyps Father     Stroke Maternal  Grandmother     Pneumonia Maternal Grandmother      passed age 88    Cancer Maternal Grandfather      brain tumor dont know what age he passed    Suicide Paternal Grandfather      passed in his 50's    No Known Problems Sister     Montoya syndrome Sister     Heart disease Sister     Thyroid disease Sister     Breast cancer Paternal Aunt     Colon cancer Neg Hx     Ovarian cancer Neg Hx        Social History     Social History    Marital status:      Spouse name: N/A    Number of children: 2    Years of education: N/A     Occupational History    Medical Assistant      Social History Main Topics    Smoking status: Never Smoker    Smokeless tobacco: Never Used    Alcohol use Yes      Comment: occasional    Drug use: No    Sexual activity: Yes     Partners: Male     Birth control/ protection: Inserts      Comment:      Other Topics Concern    None     Social History Narrative    Lives in [place].        Review of Systems   Constitutional: Negative for appetite change, chills, fatigue, fever and unexpected weight change.   HENT: Negative for congestion, ear pain, mouth sores, nosebleeds, postnasal drip, rhinorrhea, sinus pressure, sneezing, sore throat, trouble swallowing and voice change.    Eyes: Negative for photophobia, pain, discharge, redness, itching and visual disturbance.   Respiratory: Negative for cough, chest tightness and shortness of breath.    Cardiovascular: Negative for chest pain, palpitations and leg swelling.   Gastrointestinal: Negative for abdominal pain, blood in stool, constipation, diarrhea, nausea and vomiting.   Genitourinary: Negative for dysuria, frequency, hematuria and urgency.   Musculoskeletal: Negative for arthralgias, back pain, joint swelling and myalgias.   Skin: Negative for color change and rash.   Allergic/Immunologic: Negative for immunocompromised state.   Neurological: Negative for dizziness, seizures, syncope, weakness and headaches.  "  Hematological: Negative for adenopathy. Does not bruise/bleed easily.   Psychiatric/Behavioral: Negative for agitation, dysphoric mood, sleep disturbance and suicidal ideas. The patient is not nervous/anxious.          Objective:     Vitals:    05/01/18 1546   BP: 124/86   BP Location: Right arm   Patient Position: Sitting   BP Method: Medium (Manual)   Pulse: 97   Temp: 99.2 °F (37.3 °C)   TempSrc: Oral   SpO2: 100%   Weight: 78.3 kg (172 lb 10.6 oz)   Height: 5' 4" (1.626 m)          Physical Exam   Constitutional: She is oriented to person, place, and time. She appears well-developed and well-nourished.   HENT:   Head: Normocephalic and atraumatic.   Right Ear: External ear normal.   Left Ear: External ear normal.   Nose: Nose normal.   Mouth/Throat: Oropharynx is clear and moist. No oropharyngeal exudate.   Eyes: EOM are normal. Pupils are equal, round, and reactive to light.   Neck: Normal range of motion. Neck supple. No tracheal deviation present. No thyromegaly present.   Cardiovascular: Normal rate, regular rhythm and normal heart sounds.    No murmur heard.  Pulmonary/Chest: Effort normal and breath sounds normal. No respiratory distress.   Abdominal: Soft. She exhibits no distension.   Musculoskeletal: Normal range of motion. She exhibits no edema.   Lymphadenopathy:     She has no cervical adenopathy.   Neurological: She is alert and oriented to person, place, and time. No cranial nerve deficit. Coordination normal.   Skin: Skin is warm and dry. No rash noted.   Psychiatric: She has a normal mood and affect.         Assessment:         ICD-10-CM ICD-9-CM   1. ADHD (attention deficit hyperactivity disorder), inattentive type F90.0 314.00       Plan:       ADHD (attention deficit hyperactivity disorder), inattentive type  -  Controlled on present dose.  Follow up in 3 months.  -     dextroamphetamine-amphetamine (ADDERALL) 20 mg tablet; Take 1 tablet by mouth 2 (two) times daily.  Dispense: 60 tablet; " Refill: 0  -     dextroamphetamine-amphetamine (ADDERALL) 20 mg tablet; Take 1 tablet by mouth 2 (two) times daily.  Dispense: 60 tablet; Refill: 0  -     dextroamphetamine-amphetamine (ADDERALL) 20 mg tablet; Take 1 tablet by mouth 2 (two) times daily.  Dispense: 60 tablet; Refill: 0      Follow-up if symptoms worsen or fail to improve.     Patient's Medications   New Prescriptions    DEXTROAMPHETAMINE-AMPHETAMINE (ADDERALL) 20 MG TABLET    Take 1 tablet by mouth 2 (two) times daily.    DEXTROAMPHETAMINE-AMPHETAMINE (ADDERALL) 20 MG TABLET    Take 1 tablet by mouth 2 (two) times daily.   Previous Medications    ALUMINUM CHLORIDE (DRYSOL DAB-O-MATIC) 20 % EXTERNAL SOLUTION    Apply topically every evening.    CLONAZEPAM (KLONOPIN) 0.5 MG TABLET    Take 1 tablet (0.5 mg total) by mouth daily as needed for Anxiety (insomnia).    CYCLOBENZAPRINE (FLEXERIL) 10 MG TABLET    Take 1 tablet (10 mg total) by mouth 3 (three) times daily as needed for Muscle spasms.    DICLOFENAC (VOLTAREN) 50 MG EC TABLET    Take 1 tablet (50 mg total) by mouth 2 (two) times daily.    ETONOGESTREL-ETHINYL ESTRADIOL (NUVARING) 0.12-0.015 MG/24 HR VAGINAL RING    PLACE 1 RING VAGINALLY EVERY 28 DAYS   Modified Medications    Modified Medication Previous Medication    DEXTROAMPHETAMINE-AMPHETAMINE (ADDERALL) 20 MG TABLET dextroamphetamine-amphetamine (ADDERALL) 20 mg tablet       Take 1 tablet by mouth 2 (two) times daily.    Take 1 tablet by mouth 2 (two) times daily.   Discontinued Medications    No medications on file

## 2018-05-02 ENCOUNTER — TELEPHONE (OUTPATIENT)
Dept: GASTROENTEROLOGY | Facility: CLINIC | Age: 38
End: 2018-05-02

## 2018-05-02 RX ORDER — DICYCLOMINE HYDROCHLORIDE 10 MG/1
10 CAPSULE ORAL 4 TIMES DAILY PRN
Qty: 120 CAPSULE | Refills: 3 | Status: SHIPPED | OUTPATIENT
Start: 2018-05-02 | End: 2018-06-01

## 2018-05-18 ENCOUNTER — PATIENT MESSAGE (OUTPATIENT)
Dept: ORTHOPEDICS | Facility: CLINIC | Age: 38
End: 2018-05-18

## 2018-05-18 DIAGNOSIS — M23.92 INTERNAL DERANGEMENT OF KNEE JOINT, LEFT: Primary | ICD-10-CM

## 2018-08-03 ENCOUNTER — OFFICE VISIT (OUTPATIENT)
Dept: FAMILY MEDICINE | Facility: CLINIC | Age: 38
End: 2018-08-03
Payer: COMMERCIAL

## 2018-08-03 VITALS
HEIGHT: 65 IN | RESPIRATION RATE: 14 BRPM | SYSTOLIC BLOOD PRESSURE: 108 MMHG | TEMPERATURE: 98 F | WEIGHT: 169 LBS | DIASTOLIC BLOOD PRESSURE: 70 MMHG | OXYGEN SATURATION: 98 % | BODY MASS INDEX: 28.16 KG/M2 | HEART RATE: 78 BPM

## 2018-08-03 DIAGNOSIS — F90.0 ADHD (ATTENTION DEFICIT HYPERACTIVITY DISORDER), INATTENTIVE TYPE: Primary | ICD-10-CM

## 2018-08-03 PROBLEM — R63.5 WEIGHT GAIN: Status: RESOLVED | Noted: 2017-11-16 | Resolved: 2018-08-03

## 2018-08-03 PROBLEM — Z12.4 CERVICAL CANCER SCREENING: Status: RESOLVED | Noted: 2017-01-18 | Resolved: 2018-08-03

## 2018-08-03 PROBLEM — R53.83 FATIGUE: Status: RESOLVED | Noted: 2017-11-16 | Resolved: 2018-08-03

## 2018-08-03 PROBLEM — Z30.44 ENCOUNTER FOR SURVEILLANCE OF VAGINAL RING HORMONAL CONTRACEPTIVE DEVICE: Status: RESOLVED | Noted: 2017-10-31 | Resolved: 2018-08-03

## 2018-08-03 PROCEDURE — 99213 OFFICE O/P EST LOW 20 MIN: CPT | Mod: S$GLB,,, | Performed by: FAMILY MEDICINE

## 2018-08-03 PROCEDURE — 3008F BODY MASS INDEX DOCD: CPT | Mod: CPTII,S$GLB,, | Performed by: FAMILY MEDICINE

## 2018-08-03 PROCEDURE — 99999 PR PBB SHADOW E&M-EST. PATIENT-LVL IV: CPT | Mod: PBBFAC,,, | Performed by: FAMILY MEDICINE

## 2018-08-03 RX ORDER — DEXTROAMPHETAMINE SACCHARATE, AMPHETAMINE ASPARTATE, DEXTROAMPHETAMINE SULFATE AND AMPHETAMINE SULFATE 5; 5; 5; 5 MG/1; MG/1; MG/1; MG/1
1 TABLET ORAL 2 TIMES DAILY
Qty: 60 TABLET | Refills: 0 | Status: SHIPPED | OUTPATIENT
Start: 2018-08-03 | End: 2018-08-03 | Stop reason: SDUPTHER

## 2018-08-03 RX ORDER — DEXTROAMPHETAMINE SACCHARATE, AMPHETAMINE ASPARTATE, DEXTROAMPHETAMINE SULFATE AND AMPHETAMINE SULFATE 5; 5; 5; 5 MG/1; MG/1; MG/1; MG/1
1 TABLET ORAL 2 TIMES DAILY
Qty: 60 TABLET | Refills: 0 | Status: SHIPPED | OUTPATIENT
Start: 2018-08-03 | End: 2018-11-14 | Stop reason: SDUPTHER

## 2018-08-03 NOTE — ASSESSMENT & PLAN NOTE
- well controlled   - stable on medication  -  reviewed and no signs of inappropriate use  - 3 Rx's given to pt

## 2018-08-03 NOTE — PROGRESS NOTES
FAMILY MEDICINE    Patient Active Problem List   Diagnosis    Anxiety and depression    IBS (irritable bowel syndrome)    ADHD (attention deficit hyperactivity disorder), inattentive type    Insomnia    Overweight (BMI 25.0-29.9)    Chronic pain of left knee       CC:   Chief Complaint   Patient presents with    Medication Refill       SUBJECTIVE:  Judie Schmitz   is a 38 y.o. female  - here for ADHD follow-up. Provider Ольга Cesar is out of the office. She is currently on Adderall 20 mg one tab twice a day. 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 No unwanted side effects. No recent changes in medication.         ROS: Review of Systems   Constitutional: Negative for activity change, appetite change and fatigue.   Eyes: Negative for photophobia.   Respiratory: Negative for chest tightness and shortness of breath.    Cardiovascular: Negative for chest pain and palpitations.   Gastrointestinal: Negative for abdominal pain, constipation, nausea and vomiting.   Psychiatric/Behavioral: Negative for decreased concentration and dysphoric mood. The patient is nervous/anxious (though well controlled).        Past Medical History:   Diagnosis Date    Abnormal Pap smear of cervix age 34    no treatment; repeat PAPs normal    ADHD (attention deficit hyperactivity disorder), inattentive type 2007    diagnosed by Dr. Denny - taking Adderall 10 mg twice daily from 2007 to  - increased Adderall to 20 mg twice daily until  - patient quit school and got off med - started back on Adderall 10 in  but then off med when had baby in  - has not been on med since having baby in .    Anxiety and depression     IBS (irritable bowel syndrome)     Insomnia     Pregnancy            Past Surgical History:   Procedure Laterality Date     SECTION      x2 2002-10/21/2013       ALLERGIES: Review of patient's allergies indicates:  No Known Allergies    MEDS:  "  Current Outpatient Prescriptions:     aluminum chloride (DRYSOL DAB-O-MATIC) 20 % external solution, Apply topically every evening., Disp: 60 mL, Rfl: 0    clonazePAM (KLONOPIN) 0.5 MG tablet, Take 1 tablet (0.5 mg total) by mouth daily as needed for Anxiety (insomnia)., Disp: 30 tablet, Rfl: 1    cyclobenzaprine (FLEXERIL) 10 MG tablet, Take 1 tablet (10 mg total) by mouth 3 (three) times daily as needed for Muscle spasms., Disp: 90 tablet, Rfl: 1    dextroamphetamine-amphetamine (ADDERALL) 20 mg tablet, Take 1 tablet by mouth 2 (two) times daily., Disp: 60 tablet, Rfl: 0    diclofenac (VOLTAREN) 50 MG EC tablet, Take 1 tablet (50 mg total) by mouth 2 (two) times daily., Disp: 60 tablet, Rfl: 1    etonogestrel-ethinyl estradiol (NUVARING) 0.12-0.015 mg/24 hr vaginal ring, PLACE 1 RING VAGINALLY EVERY 28 DAYS, Disp: 3 each, Rfl: 3    OBJECTIVE:   Vitals:    08/03/18 0810   BP: 108/70   BP Location: Left arm   Patient Position: Sitting   Pulse: 78   Resp: 14   Temp: 98.3 °F (36.8 °C)   TempSrc: Oral   SpO2: 98%   Weight: 76.7 kg (169 lb)   Height: 5' 5" (1.651 m)       Physical Exam   Constitutional: No distress.   Neck: Neck supple.   Cardiovascular: Normal rate, regular rhythm and normal heart sounds.    Pulmonary/Chest: Effort normal and breath sounds normal.   Musculoskeletal: She exhibits no edema.         ASSESSMENT:  Problem List Items Addressed This Visit     ADHD (attention deficit hyperactivity disorder), inattentive type - Primary    Current Assessment & Plan     - well controlled   - stable on medication  -  reviewed and no signs of inappropriate use  - 3 Rx's given to pt         Relevant Medications    dextroamphetamine-amphetamine (ADDERALL) 20 mg tablet          PLAN:   Orders Placed This Encounter    dextroamphetamine-amphetamine (ADDERALL) 20 mg tablet     Follow-up with Ольга Cesar in 3 months.     Dr. Joyce Francisco D.O.   Family Medicine    "

## 2018-08-23 RX ORDER — CLONAZEPAM 0.5 MG/1
0.5 TABLET ORAL DAILY PRN
Qty: 30 TABLET | Refills: 1 | Status: SHIPPED | OUTPATIENT
Start: 2018-08-23 | End: 2018-11-14 | Stop reason: ALTCHOICE

## 2018-10-05 ENCOUNTER — TELEPHONE (OUTPATIENT)
Dept: INTERNAL MEDICINE | Facility: CLINIC | Age: 38
End: 2018-10-05

## 2018-10-05 DIAGNOSIS — N30.01 ACUTE CYSTITIS WITH HEMATURIA: Primary | ICD-10-CM

## 2018-10-05 RX ORDER — CIPROFLOXACIN 500 MG/1
500 TABLET ORAL EVERY 12 HOURS
Qty: 10 TABLET | Refills: 0 | Status: SHIPPED | OUTPATIENT
Start: 2018-10-05 | End: 2018-10-10

## 2018-10-05 NOTE — TELEPHONE ENCOUNTER
Patient has UTI, burning with urinary    POCT UA  Yellow  1.015 Spec Gravity  6 pH  ++ Leuk  + Nitrite  - Protein  Normal Glucose  - Ketone  Normal Urobilinogen  - Bilirubin  ABout 250 Blood     Please send ABX and pyridium or something for the discomfort if possible to [facility]

## 2018-11-14 ENCOUNTER — OFFICE VISIT (OUTPATIENT)
Dept: FAMILY MEDICINE | Facility: CLINIC | Age: 38
End: 2018-11-14
Payer: COMMERCIAL

## 2018-11-14 VITALS
OXYGEN SATURATION: 100 % | SYSTOLIC BLOOD PRESSURE: 132 MMHG | RESPIRATION RATE: 19 BRPM | HEIGHT: 64 IN | TEMPERATURE: 99 F | DIASTOLIC BLOOD PRESSURE: 68 MMHG | HEART RATE: 74 BPM | WEIGHT: 169 LBS | BODY MASS INDEX: 28.85 KG/M2

## 2018-11-14 DIAGNOSIS — G47.00 INSOMNIA, UNSPECIFIED TYPE: ICD-10-CM

## 2018-11-14 DIAGNOSIS — K58.9 IRRITABLE BOWEL SYNDROME, UNSPECIFIED TYPE: ICD-10-CM

## 2018-11-14 DIAGNOSIS — G89.29 CHRONIC MIDLINE LOW BACK PAIN WITHOUT SCIATICA: ICD-10-CM

## 2018-11-14 DIAGNOSIS — R61 EXCESSIVE SWEATING: ICD-10-CM

## 2018-11-14 DIAGNOSIS — F90.0 ADHD (ATTENTION DEFICIT HYPERACTIVITY DISORDER), INATTENTIVE TYPE: Primary | ICD-10-CM

## 2018-11-14 DIAGNOSIS — M54.50 CHRONIC MIDLINE LOW BACK PAIN WITHOUT SCIATICA: ICD-10-CM

## 2018-11-14 PROCEDURE — 99999 PR PBB SHADOW E&M-EST. PATIENT-LVL IV: CPT | Mod: PBBFAC,,, | Performed by: NURSE PRACTITIONER

## 2018-11-14 PROCEDURE — 3008F BODY MASS INDEX DOCD: CPT | Mod: CPTII,S$GLB,, | Performed by: NURSE PRACTITIONER

## 2018-11-14 PROCEDURE — 99214 OFFICE O/P EST MOD 30 MIN: CPT | Mod: S$GLB,,, | Performed by: NURSE PRACTITIONER

## 2018-11-14 RX ORDER — DEXTROAMPHETAMINE SACCHARATE, AMPHETAMINE ASPARTATE, DEXTROAMPHETAMINE SULFATE AND AMPHETAMINE SULFATE 5; 5; 5; 5 MG/1; MG/1; MG/1; MG/1
1 TABLET ORAL 2 TIMES DAILY
Qty: 60 TABLET | Refills: 0 | Status: SHIPPED | OUTPATIENT
Start: 2018-11-14 | End: 2019-02-15 | Stop reason: SDUPTHER

## 2018-11-14 RX ORDER — DICYCLOMINE HYDROCHLORIDE 10 MG/1
10 CAPSULE ORAL 4 TIMES DAILY PRN
COMMUNITY
End: 2020-05-04 | Stop reason: SDUPTHER

## 2018-11-14 RX ORDER — DEXTROAMPHETAMINE SACCHARATE, AMPHETAMINE ASPARTATE, DEXTROAMPHETAMINE SULFATE AND AMPHETAMINE SULFATE 5; 5; 5; 5 MG/1; MG/1; MG/1; MG/1
1 TABLET ORAL 2 TIMES DAILY
Qty: 60 TABLET | Refills: 0 | Status: SHIPPED | OUTPATIENT
Start: 2019-01-12 | End: 2019-02-15 | Stop reason: SDUPTHER

## 2018-11-14 RX ORDER — HYDROXYZINE PAMOATE 50 MG/1
CAPSULE ORAL
Qty: 30 CAPSULE | Refills: 0 | Status: SHIPPED | OUTPATIENT
Start: 2018-11-14 | End: 2018-11-29 | Stop reason: SDUPTHER

## 2018-11-14 RX ORDER — DEXTROAMPHETAMINE SACCHARATE, AMPHETAMINE ASPARTATE, DEXTROAMPHETAMINE SULFATE AND AMPHETAMINE SULFATE 5; 5; 5; 5 MG/1; MG/1; MG/1; MG/1
1 TABLET ORAL 2 TIMES DAILY
Qty: 60 TABLET | Refills: 0 | Status: SHIPPED | OUTPATIENT
Start: 2018-12-14 | End: 2019-02-15 | Stop reason: SDUPTHER

## 2018-11-14 RX ORDER — DEXTROAMPHETAMINE SACCHARATE, AMPHETAMINE ASPARTATE, DEXTROAMPHETAMINE SULFATE AND AMPHETAMINE SULFATE 5; 5; 5; 5 MG/1; MG/1; MG/1; MG/1
1 TABLET ORAL DAILY
Qty: 60 TABLET | Refills: 0 | Status: SHIPPED | OUTPATIENT
Start: 2018-12-14 | End: 2018-11-14 | Stop reason: CLARIF

## 2018-11-14 NOTE — PROGRESS NOTES
Subjective:       Patient ID: Judie Schmitz is a 38 y.o. female.    Chief Complaint: Medication Refill (adderall)    Patient is a 38-year-old white female with ADHD, chronic intermittent low back pain, excessive sweating, IBS, and insomnia that is here today for 3 month follow-up.    Patient has ADHD and has been taking Adderall 20 mg twice daily since March 2018.  She reports the dose is working well without side effects.  Patient works as a medical assistant for podiatrist.    Patient has chronic intermittent low back pain -  she reports the back pain occurs mostly in the evening after being on her feet all day.  She reports she does get some relief with the diclofenac and Flexeril as needed.     Patient has insomnia and takes clonazepam only as needed which she reports on average once or twice a week but strongly advised patient that we need to get her off of the long-acting benzodiazepine.  The combination of a stimulant for ADHD and then a benzo for sleep had a high risk for dependency and do not advise this combination long-term.  Advised we will stop the Clonazepam and attempt a safer alternative.  Patient reports she was tried on Trazodone in the past and ineffective.    Patient has IBS treated with Bentyl prn by GI specialist, Rina Delgado.    Patient has Excessive Sweating and uses Drysol in the evenings.            Current Outpatient Medications   Medication Sig Dispense Refill    aluminum chloride (DRYSOL DAB-O-MATIC) 20 % external solution Apply topically every evening. 60 mL 0    cyclobenzaprine (FLEXERIL) 10 MG tablet Take 1 tablet (10 mg total) by mouth 3 (three) times daily as needed for Muscle spasms. 90 tablet 1    dextroamphetamine-amphetamine (ADDERALL) 20 mg tablet Take 1 tablet by mouth 2 (two) times daily. 60 tablet 0    diclofenac (VOLTAREN) 50 MG EC tablet Take 1 tablet (50 mg total) by mouth 2 (two) times daily. 60 tablet 1    dicyclomine (BENTYL) 10 MG capsule Take 10 mg  by mouth 4 (four) times daily as needed (IBS symptoms).      etonogestrel-ethinyl estradiol (NUVARING) 0.12-0.015 mg/24 hr vaginal ring PLACE 1 RING VAGINALLY EVERY 28 DAYS 3 each 3    [START ON 2019] dextroamphetamine-amphetamine (ADDERALL) 20 mg tablet Take 1 tablet by mouth 2 (two) times daily. 60 tablet 0    [START ON 2018] dextroamphetamine-amphetamine (ADDERALL) 20 mg tablet Take 1 tablet by mouth 2 (two) times daily. 60 tablet 0    hydrOXYzine pamoate (VISTARIL) 50 MG Cap Take 1 to 2 tablets at bedtime as needed for insomnia 30 capsule 0     No current facility-administered medications for this visit.        Past Medical History:   Diagnosis Date    Abnormal Pap smear of cervix age 34    no treatment; repeat PAPs normal    ADHD (attention deficit hyperactivity disorder), inattentive type 2007    diagnosed by Dr. Denny - taking Adderall 10 mg twice daily from 2007 to  - increased Adderall to 20 mg twice daily until  - patient quit school and got off med - started back on Adderall 10 in  but then off med when had baby in  - has not been on med since having baby in .    Anxiety and depression     IBS (irritable bowel syndrome)     Insomnia     Pregnancy            Past Surgical History:   Procedure Laterality Date     SECTION      x2 2002-10/21/2013       Family History   Problem Relation Age of Onset    Hypertension Mother     COPD Mother 50    Rheum arthritis Mother     Colon polyps Father     Stroke Maternal Grandmother     Pneumonia Maternal Grandmother         passed age 88    Cancer Maternal Grandfather         brain tumor dont know what age he passed    Suicide Paternal Grandfather         passed in his 50's    No Known Problems Sister     Montoya syndrome Sister     Heart disease Sister     Thyroid disease Sister     Breast cancer Paternal Aunt     Colon cancer Neg Hx     Ovarian cancer Neg Hx        Social History      Socioeconomic History    Marital status:      Spouse name: None    Number of children: 2    Years of education: None    Highest education level: None   Social Needs    Financial resource strain: None    Food insecurity - worry: None    Food insecurity - inability: None    Transportation needs - medical: None    Transportation needs - non-medical: None   Occupational History    Occupation: Medical Assistant   Tobacco Use    Smoking status: Never Smoker    Smokeless tobacco: Never Used   Substance and Sexual Activity    Alcohol use: Yes     Comment: occasional    Drug use: No    Sexual activity: Yes     Partners: Male     Birth control/protection: Inserts     Comment:    Other Topics Concern    None   Social History Narrative    Lives in [place].        Review of Systems   Constitutional: Negative for appetite change, chills, fatigue, fever and unexpected weight change.   HENT: Negative for congestion, ear pain, mouth sores, nosebleeds, postnasal drip, rhinorrhea, sinus pressure, sneezing, sore throat, trouble swallowing and voice change.    Eyes: Negative for photophobia, pain, discharge, redness, itching and visual disturbance.   Respiratory: Negative for cough, chest tightness and shortness of breath.    Cardiovascular: Negative for chest pain, palpitations and leg swelling.   Gastrointestinal: Negative for abdominal pain, blood in stool, constipation, diarrhea, nausea and vomiting.   Genitourinary: Negative for dysuria, frequency, hematuria and urgency.   Musculoskeletal: Negative for arthralgias, back pain, joint swelling and myalgias.   Skin: Negative for color change and rash.   Allergic/Immunologic: Negative for immunocompromised state.   Neurological: Negative for dizziness, seizures, syncope, weakness and headaches.   Hematological: Negative for adenopathy. Does not bruise/bleed easily.   Psychiatric/Behavioral: Negative for agitation, dysphoric mood, sleep disturbance and  "suicidal ideas. The patient is not nervous/anxious.          Objective:     Vitals:    11/14/18 1609   BP: 132/68   Pulse: 74   Resp: 19   Temp: 98.5 °F (36.9 °C)   SpO2: 100%   Weight: 76.7 kg (169 lb)   Height: 5' 4" (1.626 m)          Physical Exam   Constitutional: She is oriented to person, place, and time. She appears well-developed and well-nourished.   HENT:   Head: Normocephalic and atraumatic.   Right Ear: External ear normal.   Left Ear: External ear normal.   Nose: Nose normal.   Mouth/Throat: Oropharynx is clear and moist. No oropharyngeal exudate.   Eyes: EOM are normal. Pupils are equal, round, and reactive to light.   Neck: Normal range of motion. Neck supple. No tracheal deviation present. No thyromegaly present.   Cardiovascular: Normal rate, regular rhythm and normal heart sounds.   No murmur heard.  Pulmonary/Chest: Effort normal and breath sounds normal. No respiratory distress.   Abdominal: Soft. She exhibits no distension.   Musculoskeletal: Normal range of motion. She exhibits no edema.   Lymphadenopathy:     She has no cervical adenopathy.   Neurological: She is alert and oriented to person, place, and time. No cranial nerve deficit. Coordination normal.   Skin: Skin is warm and dry. No rash noted.   Psychiatric: She has a normal mood and affect.         Assessment:         ICD-10-CM ICD-9-CM   1. ADHD (attention deficit hyperactivity disorder), inattentive type F90.0 314.00   2. Chronic midline low back pain without sciatica M54.5 724.2    G89.29 338.29   3. Insomnia, unspecified type G47.00 780.52   4. Excessive sweating R61 780.8   5. Irritable bowel syndrome, unspecified type K58.9 564.1       Plan:       ADHD (attention deficit hyperactivity disorder), inattentive type  -  Prescriptions dated for 3 months 11/14, 12/14, 1/12/19.  To follow up in February whether she needs refill or not -  ADHD follow up is every 3 months - just make note of last prescription date filled so that we will " fill from that date.  -     dextroamphetamine-amphetamine (ADDERALL) 20 mg tablet; Take 1 tablet by mouth 2 (two) times daily.  Dispense: 60 tablet; Refill: 0  -     dextroamphetamine-amphetamine (ADDERALL) 20 mg tablet; Take 1 tablet by mouth 2 (two) times daily.  Dispense: 60 tablet; Refill: 0  -     dextroamphetamine-amphetamine (ADDERALL) 20 mg tablet; Take 1 tablet by mouth 2 (two) times daily.  Dispense: 60 tablet; Refill: 0    Chronic midline low back pain without sciatica  -  Takes Diclofen and Flexeril only as needed for flare-ups    Insomnia, unspecified type  -  Stop the Clonazepam because long-acting benzodiazepine has increased risk for dependence especially the combination of taking amphetamine during day and benzo at night.  Will change to Vistaril 1 to 2 tablets at bedtime.  If effective follow up in 3 months.  If not effective, can return and we can try other options - if we can not find a non-controlled substance to address the insomnia - then I will refer to psych for further management.  -     hydrOXYzine pamoate (VISTARIL) 50 MG Cap; Take 1 to 2 tablets at bedtime as needed for insomnia  Dispense: 30 capsule; Refill: 0    Excessive sweating  -  Continue Drysol prn    Irritable bowel syndrome, unspecified type  -  Followed by GI specialist.        Follow-up in about 3 months (around 2/14/2019) for med check.        Medication List           Accurate as of 11/14/18  5:53 PM. If you have any questions, ask your nurse or doctor.               START taking these medications    hydrOXYzine pamoate 50 MG Cap  Commonly known as:  VISTARIL  Take 1 to 2 tablets at bedtime as needed for insomnia  Started by:  Ольга Cesar NP        CHANGE how you take these medications    * dextroamphetamine-amphetamine 20 mg tablet  Commonly known as:  ADDERALL  Take 1 tablet by mouth 2 (two) times daily.  What changed:  Another medication with the same name was added. Make sure you understand how and when to take  each.  Changed by:  Ольга Cesar NP     * dextroamphetamine-amphetamine 20 mg tablet  Commonly known as:  ADDERALL  Take 1 tablet by mouth 2 (two) times daily.  Start taking on:  12/14/2018  What changed:  You were already taking a medication with the same name, and this prescription was added. Make sure you understand how and when to take each.  Changed by:  Ольга Cesar NP     * dextroamphetamine-amphetamine 20 mg tablet  Commonly known as:  ADDERALL  Take 1 tablet by mouth 2 (two) times daily.  Start taking on:  1/12/2019  What changed:  You were already taking a medication with the same name, and this prescription was added. Make sure you understand how and when to take each.  Changed by:  Ольга Cesar NP         * This list has 3 medication(s) that are the same as other medications prescribed for you. Read the directions carefully, and ask your doctor or other care provider to review them with you.            CONTINUE taking these medications    aluminum chloride 20 % external solution  Commonly known as:  DRYSOL DAB-O-MATIC  Apply topically every evening.     cyclobenzaprine 10 MG tablet  Commonly known as:  FLEXERIL  Take 1 tablet (10 mg total) by mouth 3 (three) times daily as needed for Muscle spasms.     diclofenac 50 MG EC tablet  Commonly known as:  VOLTAREN  Take 1 tablet (50 mg total) by mouth 2 (two) times daily.     dicyclomine 10 MG capsule  Commonly known as:  BENTYL     etonogestrel-ethinyl estradiol 0.12-0.015 mg/24 hr vaginal ring  Commonly known as:  NUVARING  PLACE 1 RING VAGINALLY EVERY 28 DAYS        STOP taking these medications    clonazePAM 0.5 MG tablet  Commonly known as:  KLONOPIN  Stopped by:  Ольга Cesar NP           Where to Get Your Medications      These medications were sent to [facility]/pharmacy #[id number] - [place] [facility] - [address] AT [facility]  [address], [place] [address]    Phone:  [phone]   · hydrOXYzine pamoate 50 MG Cap      You can get these medications from any pharmacy    Bring a paper prescription for each of these medications  · dextroamphetamine-amphetamine 20 mg tablet  · dextroamphetamine-amphetamine 20 mg tablet  · dextroamphetamine-amphetamine 20 mg tablet

## 2018-11-28 ENCOUNTER — PATIENT MESSAGE (OUTPATIENT)
Dept: FAMILY MEDICINE | Facility: CLINIC | Age: 38
End: 2018-11-28

## 2018-11-28 DIAGNOSIS — G47.00 INSOMNIA, UNSPECIFIED TYPE: ICD-10-CM

## 2018-11-29 RX ORDER — HYDROXYZINE PAMOATE 50 MG/1
CAPSULE ORAL
Qty: 90 CAPSULE | Refills: 4 | Status: SHIPPED | OUTPATIENT
Start: 2018-11-29 | End: 2019-04-10 | Stop reason: ALTCHOICE

## 2019-01-09 ENCOUNTER — PATIENT MESSAGE (OUTPATIENT)
Dept: FAMILY MEDICINE | Facility: CLINIC | Age: 39
End: 2019-01-09

## 2019-02-13 ENCOUNTER — OFFICE VISIT (OUTPATIENT)
Dept: RHEUMATOLOGY | Facility: CLINIC | Age: 39
End: 2019-02-13
Payer: COMMERCIAL

## 2019-02-13 VITALS
WEIGHT: 174.63 LBS | HEART RATE: 82 BPM | DIASTOLIC BLOOD PRESSURE: 87 MMHG | HEIGHT: 64 IN | SYSTOLIC BLOOD PRESSURE: 131 MMHG | BODY MASS INDEX: 29.81 KG/M2

## 2019-02-13 DIAGNOSIS — I73.00 RAYNAUD'S DISEASE WITHOUT GANGRENE: Primary | ICD-10-CM

## 2019-02-13 DIAGNOSIS — G89.29 CHRONIC MIDLINE LOW BACK PAIN WITHOUT SCIATICA: ICD-10-CM

## 2019-02-13 DIAGNOSIS — M54.50 CHRONIC MIDLINE LOW BACK PAIN WITHOUT SCIATICA: ICD-10-CM

## 2019-02-13 PROCEDURE — 99999 PR PBB SHADOW E&M-EST. PATIENT-LVL III: CPT | Mod: PBBFAC,,, | Performed by: INTERNAL MEDICINE

## 2019-02-13 PROCEDURE — 99999 PR PBB SHADOW E&M-EST. PATIENT-LVL III: ICD-10-PCS | Mod: PBBFAC,,, | Performed by: INTERNAL MEDICINE

## 2019-02-13 PROCEDURE — 3008F PR BODY MASS INDEX (BMI) DOCUMENTED: ICD-10-PCS | Mod: CPTII,S$GLB,, | Performed by: INTERNAL MEDICINE

## 2019-02-13 PROCEDURE — 3008F BODY MASS INDEX DOCD: CPT | Mod: CPTII,S$GLB,, | Performed by: INTERNAL MEDICINE

## 2019-02-13 PROCEDURE — 99204 OFFICE O/P NEW MOD 45 MIN: CPT | Mod: S$GLB,,, | Performed by: INTERNAL MEDICINE

## 2019-02-13 PROCEDURE — 99204 PR OFFICE/OUTPT VISIT, NEW, LEVL IV, 45-59 MIN: ICD-10-PCS | Mod: S$GLB,,, | Performed by: INTERNAL MEDICINE

## 2019-02-13 RX ORDER — CLONAZEPAM 0.5 MG/1
0.5 TABLET, ORALLY DISINTEGRATING ORAL NIGHTLY PRN
Qty: 30 TABLET | Refills: 0 | Status: SHIPPED | OUTPATIENT
Start: 2019-02-13 | End: 2020-10-29 | Stop reason: ALTCHOICE

## 2019-02-13 ASSESSMENT — ROUTINE ASSESSMENT OF PATIENT INDEX DATA (RAPID3)
PAIN SCORE: 3
PSYCHOLOGICAL DISTRESS SCORE: 3.3
FATIGUE SCORE: 0
TOTAL RAPID3 SCORE: 1.67
PATIENT GLOBAL ASSESSMENT SCORE: 2
AM STIFFNESS SCORE: 1, YES
MDHAQ FUNCTION SCORE: 0

## 2019-02-13 NOTE — PROGRESS NOTES
Chief Complaint   Patient presents with    Disease Management     raynaud's evaluation       Patient was referred by : self     History of presenting illness    38 year old white female has raynaud's for atleast 10 years    The past few months it has been really bad    Fingers turn white  Toes turn white and purple    No digital ulcers    Knee pain every day    Low back pain : pain bothers her more often  Wakes her at night at times  One hour morning stiffness  Prn flexeril helps  Overactivity makes it worse  Prolonged rest makes it worse    No skin rashes,malar rash,photosensitivity  She flushes easy  Alcohol can give her flushing  No telangiectasias  No calcinosis     No patchy alopecia  No oral and nasal ulcers  No sicca symptoms     No pleurisy or any cardiopulmonary complaints  No dysphagia,diplopia and dysphonia and muscle weakness  No n/v/d/c  No acid reflux+  Has IBS symptoms     No cytopenias  No renal issues    No blood clots     No fever,chills,night sweats,weight loss and loss of appetite     No pregnancy losses    No neurologic symptoms    No skin tightening     Past history : ADHD,anxiety and depression,IBS,insomnia     Family history : mom has raynaud's and RA    Social history : not a smoker,alcoholic        Review of Systems   Constitutional: Negative for activity change, appetite change, chills, diaphoresis, fatigue, fever and unexpected weight change.   HENT: Negative for congestion, dental problem, drooling, ear discharge, ear pain, facial swelling, hearing loss, mouth sores, nosebleeds, postnasal drip, rhinorrhea, sinus pressure, sinus pain, sneezing, sore throat, tinnitus, trouble swallowing and voice change.    Eyes: Negative for photophobia, pain, discharge, redness, itching and visual disturbance.   Respiratory: Negative for apnea, cough, choking, chest tightness, shortness of breath, wheezing and stridor.    Cardiovascular: Negative for chest pain, palpitations and leg swelling.    Gastrointestinal: Negative for abdominal distention, abdominal pain, anal bleeding, blood in stool, constipation, diarrhea, nausea, rectal pain and vomiting.   Endocrine: Negative for cold intolerance, heat intolerance, polydipsia, polyphagia and polyuria.   Genitourinary: Negative for decreased urine volume, difficulty urinating, dysuria, enuresis, flank pain, frequency, genital sores, hematuria and urgency.   Musculoskeletal: Positive for arthralgias. Negative for back pain, gait problem, joint swelling, myalgias, neck pain and neck stiffness.   Skin: Negative for color change, pallor, rash and wound.   Allergic/Immunologic: Negative for environmental allergies, food allergies and immunocompromised state.   Neurological: Negative for dizziness, tremors, seizures, syncope, facial asymmetry, speech difficulty, weakness, light-headedness, numbness and headaches.   Hematological: Negative for adenopathy. Does not bruise/bleed easily.   Psychiatric/Behavioral: Negative for agitation, behavioral problems, confusion, decreased concentration, dysphoric mood, hallucinations, self-injury, sleep disturbance and suicidal ideas. The patient is not nervous/anxious and is not hyperactive.      Physical Exam     VALENTINO-28 tender joint count: 0  VALENTINO-28 swollen joint count: 0    Physical Exam   Constitutional: She is oriented to person, place, and time and well-developed, well-nourished, and in no distress. No distress.   HENT:   Head: Normocephalic.   Mouth/Throat: Oropharynx is clear and moist.   Eyes: Conjunctivae are normal. Pupils are equal, round, and reactive to light. Right eye exhibits no discharge. Left eye exhibits no discharge. No scleral icterus.   Neck: Normal range of motion. No thyromegaly present.   Cardiovascular: Normal rate, regular rhythm, normal heart sounds and intact distal pulses.    Pulmonary/Chest: Effort normal and breath sounds normal. No stridor.   Abdominal: Soft. Bowel sounds are normal.    Lymphadenopathy:     She has no cervical adenopathy.   Neurological: She is alert and oriented to person, place, and time.   Skin: Skin is warm. No rash noted. She is not diaphoretic.     Psychiatric: Affect and judgment normal.   Musculoskeletal: Normal range of motion.           Assessment     38 year old white female has      raynaud's for atleast 10 years    The past few months it has been really bad    Fingers turn white  Toes turn white and purple    No digital ulcers    She has some knee and low back pain but she is also obese    There seems to be an inflammatory back pain component    She really has no other symptoms of an autoimmune illness    1. Raynaud's disease without gangrene    2. Chronic midline low back pain without sciatica            New problem     Plan    Avoid cold temperatures  Avoid rapid shifts of temperatures  Avoid cold/damp breezes  Wear layers of loose fitting clothes over the torso,preferably wear clothes made of cotton/wool  Wear a hat and cover face/ears with a scarf  Wear good shoes and boots that fit well and dont injure the skin  Wear heavy protective socks  Wear gloves/mittens which are warm  Always set thermostat at a good temperature  Always keep a sweater or jacket  Use flannel sheets,warm blankets,electric blankets,preheated beds  Avoid touching cold water  Wear gloves to reach into freezers  Use insulated containers,gloves or napkins to hold cold drinks and food  Rinse vegetables with warm water,protect hands while washing dishes  Use disposable chemical heat packs for extra heat  Protect the skin using lotion containing lanolin on hands and feet  Wash with mild creamy soap  Examine for finger and toe tip ulcers  Nail care very important   Avoid stress  Dont smoke  If you have an episode :  Swing them around as if you are going to throw a soft ball  Warm your fingers by placing them under armpits  Wiggle your fingers and toes  Move and walk around   Run warm water until  normal skin color returns    Investigate for an autoimmune disorder    Look for low back pain causes  LS and SI joint xrays     Judie was seen today for disease management.    Diagnoses and all orders for this visit:    Raynaud's disease without gangrene  -     CBC auto differential; Future  -     Comprehensive metabolic panel; Future  -     Sedimentation rate; Future  -     C-reactive protein; Standing  -     KARLEE Screen w/Reflex; Future  -     Sjogrens syndrome-A extractable nuclear antibody; Future  -     Anti-neutrophilic cytoplasmic antibody; Future  -     Myeloperoxidase Antibody (MPO); Future  -     Proteinase 3 Autoantibodies; Future  -     Anti-scleroderma antibody; Future  -     RNA polymerase III Ab, IgG; Future  -     PM-Scl Antibody by Immunodiffusion; Future  -     Anti Sm/RNP Antibody; Future  -     Th/To Antibody; Future  -     MyoMarker Panel 3; Future  -     Rheumatoid factor; Future  -     Cyclic citrul peptide antibody, IgG; Future  -     HLA B27 Antigen; Future  -     Cryoglobulin; Future  -     C3 complement; Standing  -     C4 complement; Standing    Chronic midline low back pain without sciatica  -     X-Ray Lumbar Spine AP And Lateral; Future  -     X-Ray Sacroiliac Joints 3 Views; Future    Other orders  -     clonazePAM (KLONOPIN) 0.5 MG disintegrating tablet; Take 1 tablet (0.5 mg total) by mouth nightly as needed.    one time klonipin refill for her anxiety and insomnia  No refills

## 2019-02-14 DIAGNOSIS — M25.50 ARTHRALGIA, UNSPECIFIED JOINT: Primary | ICD-10-CM

## 2019-02-15 ENCOUNTER — OFFICE VISIT (OUTPATIENT)
Dept: FAMILY MEDICINE | Facility: CLINIC | Age: 39
End: 2019-02-15
Payer: COMMERCIAL

## 2019-02-15 ENCOUNTER — TELEPHONE (OUTPATIENT)
Dept: FAMILY MEDICINE | Facility: CLINIC | Age: 39
End: 2019-02-15

## 2019-02-15 VITALS
RESPIRATION RATE: 18 BRPM | DIASTOLIC BLOOD PRESSURE: 82 MMHG | TEMPERATURE: 98 F | WEIGHT: 168.88 LBS | HEIGHT: 64 IN | HEART RATE: 87 BPM | SYSTOLIC BLOOD PRESSURE: 104 MMHG | BODY MASS INDEX: 28.83 KG/M2 | OXYGEN SATURATION: 99 %

## 2019-02-15 DIAGNOSIS — I73.00 RAYNAUD'S DISEASE WITHOUT GANGRENE: ICD-10-CM

## 2019-02-15 DIAGNOSIS — M54.9 BACK PAIN, UNSPECIFIED BACK LOCATION, UNSPECIFIED BACK PAIN LATERALITY, UNSPECIFIED CHRONICITY: Primary | ICD-10-CM

## 2019-02-15 DIAGNOSIS — K58.9 IRRITABLE BOWEL SYNDROME, UNSPECIFIED TYPE: ICD-10-CM

## 2019-02-15 DIAGNOSIS — R61 EXCESSIVE SWEATING: ICD-10-CM

## 2019-02-15 DIAGNOSIS — F90.0 ADHD (ATTENTION DEFICIT HYPERACTIVITY DISORDER), INATTENTIVE TYPE: Primary | ICD-10-CM

## 2019-02-15 DIAGNOSIS — M54.50 CHRONIC MIDLINE LOW BACK PAIN WITHOUT SCIATICA: ICD-10-CM

## 2019-02-15 DIAGNOSIS — G47.00 INSOMNIA, UNSPECIFIED TYPE: ICD-10-CM

## 2019-02-15 DIAGNOSIS — G89.29 CHRONIC MIDLINE LOW BACK PAIN WITHOUT SCIATICA: ICD-10-CM

## 2019-02-15 PROCEDURE — 99999 PR PBB SHADOW E&M-EST. PATIENT-LVL V: CPT | Mod: PBBFAC,,, | Performed by: NURSE PRACTITIONER

## 2019-02-15 PROCEDURE — 99214 PR OFFICE/OUTPT VISIT, EST, LEVL IV, 30-39 MIN: ICD-10-PCS | Mod: S$GLB,,, | Performed by: NURSE PRACTITIONER

## 2019-02-15 PROCEDURE — 3008F PR BODY MASS INDEX (BMI) DOCUMENTED: ICD-10-PCS | Mod: CPTII,S$GLB,, | Performed by: NURSE PRACTITIONER

## 2019-02-15 PROCEDURE — 99999 PR PBB SHADOW E&M-EST. PATIENT-LVL V: ICD-10-PCS | Mod: PBBFAC,,, | Performed by: NURSE PRACTITIONER

## 2019-02-15 PROCEDURE — 3008F BODY MASS INDEX DOCD: CPT | Mod: CPTII,S$GLB,, | Performed by: NURSE PRACTITIONER

## 2019-02-15 PROCEDURE — 99214 OFFICE O/P EST MOD 30 MIN: CPT | Mod: S$GLB,,, | Performed by: NURSE PRACTITIONER

## 2019-02-15 RX ORDER — DEXTROAMPHETAMINE SACCHARATE, AMPHETAMINE ASPARTATE, DEXTROAMPHETAMINE SULFATE AND AMPHETAMINE SULFATE 5; 5; 5; 5 MG/1; MG/1; MG/1; MG/1
1 TABLET ORAL 2 TIMES DAILY
Qty: 60 TABLET | Refills: 0 | Status: CANCELLED | OUTPATIENT
Start: 2019-02-15

## 2019-02-15 RX ORDER — DEXTROAMPHETAMINE SACCHARATE, AMPHETAMINE ASPARTATE, DEXTROAMPHETAMINE SULFATE AND AMPHETAMINE SULFATE 5; 5; 5; 5 MG/1; MG/1; MG/1; MG/1
1 TABLET ORAL 2 TIMES DAILY
Qty: 60 TABLET | Refills: 0 | Status: SHIPPED | OUTPATIENT
Start: 2019-02-15 | End: 2019-03-19 | Stop reason: SDUPTHER

## 2019-02-15 NOTE — PROGRESS NOTES
Subjective:       Patient ID: Judie Schmitz is a 38 y.o. female.    Chief Complaint: ADHD    Patient is a 38-year-old white female with ADHD, chronic intermittent low back pain, excessive sweating, IBS, Raynaud Disease followed by [facility] and insomnia that is here today for 3 month follow-up.     Patient has ADHD and has been taking Adderall 20 mg twice daily since March 2018.  She reports the dose is working well without side effects.  Patient works as a medical assistant for podiatrist. She reports that the Generic Adderall filled by [facility] is a white pill that causes increased headaches - she would like the pink/peach pill of that dose - advised patient to speak with pharmacist but I am fairly certain that [facility] uses the  of generic Adderall that is the light peach tablet.    Patient has Raynaud Disease being worked up by Rheumatology.    Patient has chronic intermittent low back pain -  she reports the back pain occurs mostly in the evening after being on her feet all day.  She reports she does get some relief with the diclofenac and Flexeril as needed.     Patient has insomnia and was taking clonazepam only as needed which she reports on average once or twice a week but strongly advised patient that we needed to get her off of the long-acting benzodiazepine at last visit. Explaining that  The combination of a stimulant for ADHD and then a benzo for sleep had a high risk for dependency and do not advise this combination long-term.  Advised to stop the Clonazepam and attempt a safer alternative.  Patient reported she was tried on Trazodone in the past and ineffective so I changed patient to Vistaril.  Patient tells me today that she discussed symptoms with Rheumatologist and she prescribed the patient Clonazepam so advised patient that as long as the Rheumatologist feels medication is necessary and she is prescribing that is fine but I will not prescribe.     Patient  has IBS treated with Bentyl prn by GI specialist, Rina Delgado. Patient asking about if she should get [facility] paperwork completed for IBS since she sometimes has to miss work.  Advised patient that she should discuss this with her GI specialist.     Patient has Excessive Sweating and uses Drysol in the evenings.           Current Outpatient Medications   Medication Sig Dispense Refill    aluminum chloride (DRYSOL DAB-O-MATIC) 20 % external solution Apply topically every evening. 60 mL 0    clonazePAM (KLONOPIN) 0.5 MG disintegrating tablet Take 1 tablet (0.5 mg total) by mouth nightly as needed. 30 tablet 0    cyclobenzaprine (FLEXERIL) 10 MG tablet Take 1 tablet (10 mg total) by mouth 3 (three) times daily as needed for Muscle spasms. 90 tablet 1    dextroamphetamine-amphetamine (ADDERALL) 20 mg tablet Take 1 tablet by mouth 2 (two) times daily. 60 tablet 0    diclofenac (VOLTAREN) 50 MG EC tablet Take 1 tablet (50 mg total) by mouth 2 (two) times daily. 60 tablet 1    dicyclomine (BENTYL) 10 MG capsule Take 10 mg by mouth 4 (four) times daily as needed (IBS symptoms).      etonogestrel-ethinyl estradiol (NUVARING) 0.12-0.015 mg/24 hr vaginal ring PLACE 1 RING VAGINALLY EVERY 28 DAYS 3 each 3    hydrOXYzine pamoate (VISTARIL) 50 MG Cap Take 1 tablet during day prn anxiety and take 2 tablets at bedtime for insomnia 90 capsule 4     No current facility-administered medications for this visit.        Past Medical History:   Diagnosis Date    Abnormal Pap smear of cervix age 34    no treatment; repeat PAPs normal    ADHD (attention deficit hyperactivity disorder), inattentive type 09/13/2007    diagnosed by Dr. Denny - taking Adderall 10 mg twice daily from 9/2007 to 2009 - increased Adderall to 20 mg twice daily until 2011 - patient quit school and got off med - started back on Adderall 10 in 2012 but then off med when had baby in 2013 - has not been on med since having baby in 2013.    Anxiety and  depression     IBS (irritable bowel syndrome)     Insomnia     Pregnancy         Raynaud's disease     followed by Rheumatology Dr. Villavicencio       Past Surgical History:   Procedure Laterality Date     SECTION      x2 2002-10/21/2013       Family History   Problem Relation Age of Onset    Hypertension Mother     COPD Mother 50    Rheum arthritis Mother     Colon polyps Father     Stroke Maternal Grandmother     Pneumonia Maternal Grandmother         passed age 88    Cancer Maternal Grandfather         brain tumor dont know what age he passed    Suicide Paternal Grandfather         passed in his 50's    No Known Problems Sister     Montoya syndrome Sister     Heart disease Sister     Thyroid disease Sister     Breast cancer Paternal Aunt     Colon cancer Neg Hx     Ovarian cancer Neg Hx        Social History     Socioeconomic History    Marital status:      Spouse name: None    Number of children: 2    Years of education: None    Highest education level: None   Social Needs    Financial resource strain: None    Food insecurity - worry: None    Food insecurity - inability: None    Transportation needs - medical: None    Transportation needs - non-medical: None   Occupational History    Occupation: Medical Assistant   Tobacco Use    Smoking status: Never Smoker    Smokeless tobacco: Never Used   Substance and Sexual Activity    Alcohol use: Yes     Comment: occasional    Drug use: No    Sexual activity: Yes     Partners: Male     Birth control/protection: Inserts     Comment:    Other Topics Concern    None   Social History Narrative    Lives in [place].        Review of Systems   Constitutional: Negative for activity change and unexpected weight change.   HENT: Positive for rhinorrhea. Negative for hearing loss and trouble swallowing.    Eyes: Negative for discharge and visual disturbance.   Respiratory: Negative for chest tightness and  "wheezing.    Cardiovascular: Negative for chest pain and palpitations.   Gastrointestinal: Negative for blood in stool, constipation, diarrhea and vomiting.   Endocrine: Negative for polydipsia and polyuria.   Genitourinary: Negative for difficulty urinating, dysuria, hematuria and menstrual problem.   Musculoskeletal: Positive for arthralgias. Negative for joint swelling and neck pain.   Neurological: Positive for headaches. Negative for weakness.   Psychiatric/Behavioral: Negative for confusion and dysphoric mood.         Objective:     Vitals:    02/15/19 1108   BP: 104/82   BP Location: Right arm   Patient Position: Sitting   BP Method: Large (Manual)   Pulse: 87   Resp: 18   Temp: 98 °F (36.7 °C)   TempSrc: Oral   SpO2: 99%   Weight: 76.6 kg (168 lb 14 oz)   Height: 5' 4" (1.626 m)          Physical Exam   Constitutional: She is oriented to person, place, and time. She appears well-developed and well-nourished.   HENT:   Head: Normocephalic and atraumatic.   Right Ear: External ear normal.   Left Ear: External ear normal.   Nose: Nose normal.   Mouth/Throat: Oropharynx is clear and moist. No oropharyngeal exudate.   Eyes: EOM are normal. Pupils are equal, round, and reactive to light.   Neck: Normal range of motion. Neck supple. No tracheal deviation present. No thyromegaly present.   Cardiovascular: Normal rate, regular rhythm and normal heart sounds.   No murmur heard.  Pulmonary/Chest: Effort normal and breath sounds normal. No respiratory distress.   Abdominal: Soft. She exhibits no distension.   Musculoskeletal: Normal range of motion. She exhibits no edema.   Lymphadenopathy:     She has no cervical adenopathy.   Neurological: She is alert and oriented to person, place, and time. No cranial nerve deficit. Coordination normal.   Skin: Skin is warm and dry. No rash noted.   Psychiatric: She has a normal mood and affect.         Assessment:         ICD-10-CM ICD-9-CM   1. ADHD (attention deficit " hyperactivity disorder), inattentive type F90.0 314.00   2. Raynaud's disease without gangrene I73.00 443.0   3. Chronic midline low back pain without sciatica M54.5 724.2    G89.29 338.29   4. Excessive sweating R61 780.8   5. Insomnia, unspecified type G47.00 780.52   6. Irritable bowel syndrome, unspecified type K58.9 564.1       Plan:       Chronic midline low back pain without sciatica  -  Take NSAID and muscle relaxer prn    ADHD (attention deficit hyperactivity disorder), inattentive type  -  Printed prescription given.  Advised to message when due for next prescription.  Recheck in 3 months.  -     dextroamphetamine-amphetamine (ADDERALL) 20 mg tablet; Take 1 tablet by mouth 2 (two) times daily.  Dispense: 60 tablet; Refill: 0    Raynaud's disease without gangrene  -  Followed by rheumatology    Excessive sweating    Insomnia, unspecified type  -  Reports that rheumatologist prescribed the clonazepam.    Irritable bowel syndrome, unspecified type  -  Followed by [facility] - advised she should discuss any FMLA paperwork with specialist.        Follow-up in about 3 months (around 5/15/2019) for med check.     Patient's Medications   New Prescriptions    No medications on file   Previous Medications    ALUMINUM CHLORIDE (DRYSOL DAB-O-MATIC) 20 % EXTERNAL SOLUTION    Apply topically every evening.    CLONAZEPAM (KLONOPIN) 0.5 MG DISINTEGRATING TABLET    Take 1 tablet (0.5 mg total) by mouth nightly as needed.    CYCLOBENZAPRINE (FLEXERIL) 10 MG TABLET    Take 1 tablet (10 mg total) by mouth 3 (three) times daily as needed for Muscle spasms.    DICLOFENAC (VOLTAREN) 50 MG EC TABLET    Take 1 tablet (50 mg total) by mouth 2 (two) times daily.    DICYCLOMINE (BENTYL) 10 MG CAPSULE    Take 10 mg by mouth 4 (four) times daily as needed (IBS symptoms).    ETONOGESTREL-ETHINYL ESTRADIOL (NUVARING) 0.12-0.015 MG/24 HR VAGINAL RING    PLACE 1 RING VAGINALLY EVERY 28 DAYS    HYDROXYZINE PAMOATE (VISTARIL) 50 MG CAP     Take 1 tablet during day prn anxiety and take 2 tablets at bedtime for insomnia   Modified Medications    Modified Medication Previous Medication    DEXTROAMPHETAMINE-AMPHETAMINE (ADDERALL) 20 MG TABLET dextroamphetamine-amphetamine (ADDERALL) 20 mg tablet       Take 1 tablet by mouth 2 (two) times daily.    Take 1 tablet by mouth 2 (two) times daily.   Discontinued Medications    DEXTROAMPHETAMINE-AMPHETAMINE (ADDERALL) 20 MG TABLET    Take 1 tablet by mouth 2 (two) times daily.    DEXTROAMPHETAMINE-AMPHETAMINE (ADDERALL) 20 MG TABLET    Take 1 tablet by mouth 2 (two) times daily.

## 2019-02-15 NOTE — PATIENT INSTRUCTIONS
Raynaud Disease  Your healthcare provider has told you that you have Raynaud disease. It is also called Raynaud phenomenon or Raynaud syndrome. There is no cure for Raynaud disease, but you can manage it to help prevent attacks.    What are the symptoms of Raynaud disease?  A Raynaud disease attack is often triggered by cold or stress. During an attack, blood vessels suddenly narrow (called vasospasm).  This most often happens in fingers and toes. In rare cases, the nose, ears, or even tongue are affected. Narrowed blood vessels reduce the blood supply to the area. The area then turns white, then blue. The area may feel numb or painful. As the attack passes, the blood vessels open. The affected area may turn bright red as it warms up, then returns to normal color.  What is the cause of Raynaud disease?  With Raynaud disease, it is believed that blood vessels in the affected areas overrespond to certain triggers, such as cold. This makes them narrow (called vasospasm) much more than in people without the disease. What causes the blood vessels to react so strongly to certain triggers is unknown. In between attacks, the blood vessels are normal and healthy. Attacks dont permanently damage the blood vessels, but may thicken the artery walls.   In some cases, Raynaud disease happens along with another disease or condition. This is often a connective tissue disorder, such as lupus, scleroderma, or rheumatoid arthritis. This is called secondary Raynaud disease (as opposed to primary Raynaud disease discussed above) and may be more severe. If this is the case for you, you and your healthcare provider can discuss treatment for the underlying condition.  What are the risk factors?  Risk factors for Raynaud disease include:  · Women are more likely to get Raynaud disease than men.  · Younger individuals are at higher risk, usually ages 15 to 30.  · Living in colder climates increases risk.  · Having a family member with  Raynaud disease increases one's risk.  · Underlying rheumatoid conditions may increase one's risk.   What are possible triggers?  Triggers for Raynaud disease include:   · Cold  · Stress  · Caffeine  · Smoking  · Repetitive movements  · Certain medicines, such as beta-blockers, migraine medicine, birth control pills and others  · Injury  How is Raynaud disease diagnosed?  Your description of your symptoms, a health history, and a physical exam are often enough for a diagnosis. Blood tests and other tests may be done to see if any underlying conditions are present and rule out other problems.  How is Raynaud disease treated?  There is no cure for Raynaud disease. But you can control symptoms and reduce the number and severity of attacks. For most people, avoiding triggers is enough to limit attacks. Your healthcare provider may suggest the following:  · Take precautions to help prevent your hands and feet from losing circulation. This includes:  ¨ Dressing warmly in cold weather.  ¨ Wear gloves or mittens when your hands may become cold, such as when you use the refrigerator or freezer.  ¨ Avoid stress and caffeine.  ¨ Exercise regularly. This may reduce the number and severity of attacks.   ¨ If you smoke, quitting may improve the condition. This is because smoking causes your blood vessels to narrow and reduces blood flow.  · Soak your hands or feet in warm (not hot) water. Do this at the first sign of attack. Keep soaking until your skin color returns to normal.  In some people, symptoms are persistent or troubling. For these cases, other treatments are a choice. Your healthcare provider can tell you more about the following:  · Prescription medicines that relax and widen blood vessels, such as calcium channel blockers. These may help relieve symptoms.  · Nerve surgery for severe cases that dont respond to other treatments. Surgery removes the nerves that surround the blood vessels in the hands and feet. Without  nerve stimulation, the blood vessels stay more relaxed. They are less likely to become very narrow due to stimulus. Nerves may be blocked using injections in some cases.  Most cases of Raynaud disease are not cause for concern. The disease doesnt get worse and isnt likely to cause any permanent damage. If attacks are severe, very prolonged, or very often, skin damage may result. Controlling attacks can help prevent this.  When to seek medical care  The following problems happen rarely, but they can be serious. Call your healthcare provider right away if you notice any of the following:  · Infection or sores on the skin  · A finger or toe turns black  · The skin breaks open on its own  · A rash develops  · A finger or toe joint becomes painful or swollen   Date Last Reviewed: 1/27/2016  © 2297-0656 [employer]. [address] [place] [address], [place] [address]. All rights reserved. This information is not intended as a substitute for professional medical care. Always follow your healthcare professional's instructions.

## 2019-03-11 ENCOUNTER — OFFICE VISIT (OUTPATIENT)
Dept: FAMILY MEDICINE | Facility: CLINIC | Age: 39
End: 2019-03-11
Payer: COMMERCIAL

## 2019-03-11 VITALS
DIASTOLIC BLOOD PRESSURE: 70 MMHG | OXYGEN SATURATION: 99 % | WEIGHT: 167 LBS | BODY MASS INDEX: 28.51 KG/M2 | SYSTOLIC BLOOD PRESSURE: 112 MMHG | TEMPERATURE: 101 F | HEART RATE: 88 BPM | HEIGHT: 64 IN

## 2019-03-11 DIAGNOSIS — J00 RHINOPHARYNGITIS: Primary | ICD-10-CM

## 2019-03-11 PROBLEM — G89.29 CHRONIC PAIN OF LEFT KNEE: Status: RESOLVED | Noted: 2018-04-05 | Resolved: 2019-03-11

## 2019-03-11 PROBLEM — M25.562 CHRONIC PAIN OF LEFT KNEE: Status: RESOLVED | Noted: 2018-04-05 | Resolved: 2019-03-11

## 2019-03-11 PROCEDURE — 99213 PR OFFICE/OUTPT VISIT, EST, LEVL III, 20-29 MIN: ICD-10-PCS | Mod: S$GLB,,, | Performed by: FAMILY MEDICINE

## 2019-03-11 PROCEDURE — 99999 PR PBB SHADOW E&M-EST. PATIENT-LVL III: ICD-10-PCS | Mod: PBBFAC,,, | Performed by: FAMILY MEDICINE

## 2019-03-11 PROCEDURE — 99999 PR PBB SHADOW E&M-EST. PATIENT-LVL III: CPT | Mod: PBBFAC,,, | Performed by: FAMILY MEDICINE

## 2019-03-11 PROCEDURE — 99213 OFFICE O/P EST LOW 20 MIN: CPT | Mod: S$GLB,,, | Performed by: FAMILY MEDICINE

## 2019-03-11 PROCEDURE — 3008F PR BODY MASS INDEX (BMI) DOCUMENTED: ICD-10-PCS | Mod: CPTII,S$GLB,, | Performed by: FAMILY MEDICINE

## 2019-03-11 PROCEDURE — 3008F BODY MASS INDEX DOCD: CPT | Mod: CPTII,S$GLB,, | Performed by: FAMILY MEDICINE

## 2019-03-11 NOTE — PROGRESS NOTES
FAMILY MEDICINE    Patient Active Problem List   Diagnosis    IBS (irritable bowel syndrome)    ADHD (attention deficit hyperactivity disorder), inattentive type    Insomnia    Overweight (BMI 25.0-29.9)    Excessive sweating    Chronic midline low back pain without sciatica    Raynaud's disease       CC:   Chief Complaint   Patient presents with    Sinusitis     sinus pressure, feeling tired    Headache     ear pain and palate pain    Sore Throat       SUBJECTIVE:  Judie Schmitz   is a 38 y.o. female   - is a medical assistant with ADHD, Raynaud's syndrome and IBS presents with sinus pressure, headache, and fever. PCP NP Ольга Cesar.     1. 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 Negative for appetite change and unexpected weight change.   HENT: Positive for congestion, rhinorrhea, sinus pressure and sore throat. Negative for ear discharge, ear pain, nosebleeds, sinus pain and tinnitus.    Eyes: Positive for discharge (clear). Negative for pain, redness and itching.   Respiratory: Positive for cough. Negative for chest tightness and shortness of breath.    Cardiovascular: Negative for chest pain and palpitations.   Gastrointestinal: Negative for abdominal pain, blood in stool, constipation, diarrhea, nausea and vomiting.   Endocrine: Negative for cold intolerance, heat intolerance, polydipsia, polyphagia and polyuria.   Genitourinary: Negative for difficulty urinating.   Musculoskeletal:  Positive for myalgias.   Skin: Negative for rash.   Neurological: Positive for headaches. Negative for dizziness, weakness and light-headedness.   Psychiatric/Behavioral: Positive for sleep disturbance.       Past Medical History:   Diagnosis Date    Abnormal Pap smear of cervix age 34    no treatment; repeat PAPs normal    ADHD (attention deficit hyperactivity disorder), inattentive type 2007    diagnosed by Dr. Denny - taking Adderall 10 mg twice daily from 2007 to  - increased Adderall to 20 mg twice daily until  - patient quit school and got off med - started back on Adderall 10 in  but then off med when had baby in  - has not been on med since having baby in .    Anxiety and depression     IBS (irritable bowel syndrome)     Insomnia     Pregnancy         Raynaud's disease     followed by Rheumatology Dr. Villavicencio       Past Surgical History:   Procedure Laterality Date     SECTION      x2 2002-10/21/2013       ALLERGIES: Review of patient's allergies indicates:  No Known Allergies    MEDS:   Current Outpatient Medications:     aluminum chloride (DRYSOL DAB-O-MATIC) 20 % external solution, Apply topically every evening., Disp: 60 mL, Rfl: 0    clonazePAM (KLONOPIN) 0.5 MG disintegrating tablet, Take 1 tablet (0.5 mg total) by mouth nightly as needed., Disp: 30 tablet, Rfl: 0    cyclobenzaprine (FLEXERIL) 10 MG tablet, Take 1 tablet (10 mg total) by mouth 3 (three) times daily as needed for Muscle spasms., Disp: 90 tablet, Rfl: 1    dextroamphetamine-amphetamine (ADDERALL) 20 mg tablet, Take 1 tablet by mouth 2 (two) times daily., Disp: 60 tablet, Rfl: 0    diclofenac (VOLTAREN) 50 MG EC tablet, Take 1 tablet (50 mg total) by mouth 2 (two) times daily., Disp: 60 tablet, Rfl: 1    dicyclomine (BENTYL) 10 MG capsule, Take 10 mg by mouth 4 (four) times daily as needed (IBS symptoms)., Disp: , Rfl:     etonogestrel-ethinyl estradiol (NUVARING)  "0.12-0.015 mg/24 hr vaginal ring, PLACE 1 RING VAGINALLY EVERY 28 DAYS, Disp: 3 each, Rfl: 3    hydrOXYzine pamoate (VISTARIL) 50 MG Cap, Take 1 tablet during day prn anxiety and take 2 tablets at bedtime for insomnia, Disp: 90 capsule, Rfl: 4    OBJECTIVE:   Vitals:    03/11/19 0837   BP: 112/70   BP Location: Left arm   Patient Position: Sitting   BP Method: Medium (Automatic)   Pulse: 88   Temp: (!) 100.9 °F (38.3 °C)   TempSrc: Oral   SpO2: 99%   Weight: 75.8 kg (167 lb)   Height: 5' 4" (1.626 m)     Body mass index is 28.67 kg/m².    Physical Exam   Constitutional: No distress.   HENT:   Right Ear: Tympanic membrane and ear canal normal.   Left Ear: Tympanic membrane and ear canal normal.   Nose: Mucosal edema and rhinorrhea present. Right sinus exhibits no maxillary sinus tenderness and no frontal sinus tenderness. Left sinus exhibits no maxillary sinus tenderness and no frontal sinus tenderness.   Mouth/Throat: Uvula is midline and mucous membranes are normal. Posterior oropharyngeal erythema present. No oropharyngeal exudate.   Neck: Neck supple.   Cardiovascular: Normal rate, regular rhythm, normal heart sounds and intact distal pulses. Exam reveals no gallop and no friction rub.   No murmur heard.  Pulmonary/Chest: Effort normal and breath sounds normal. She has no decreased breath sounds. She has no wheezes. She has no rhonchi. She has no rales.   Musculoskeletal: She exhibits no edema.   Lymphadenopathy:     She has no cervical adenopathy.   Neurological: She is alert.       PERTINENT RESULTS:   3/11/19 Rapid flu testing: negative    ASSESSMENT/PLAN:  Problem List Items Addressed This Visit     None      Visit Diagnoses     Rhinopharyngitis    -  Primary    - flu testing negative  - supportive care        Encouraged to patient to notify me of any concerns or worsening symptoms.     Follow-up as needed    Dr. Joyce Francisco D.O.   Family Medicine    "

## 2019-03-11 NOTE — PATIENT INSTRUCTIONS
1. Rest  2. Lots of fluids: water and soups. Avoid sugar drinks and juices  3. Vitamin C 1000 mg daily and Zinc 50 mg daily  4. Over the counter medication like   - cough syrup Daytime and Nighttime. If you have heart disease or hypertension, look for medications that have HBP and a heart that shows that they are safe for you. If you have diabetes, looks for sugar-free  - Vicks vapor on your chest  5. Ibuprofen, Motrin, or Aleve for muscle aches, fever and sore throat  - avoid if you have heart disease, history of a stroke or hypertension  6. Tylenol Extra Strength can also be used for muscle aches, fever and sore throat  - avoid if you have severe liver issue  7. Notify me if fever >3 days, fever that resolves and then returns, shortness of breath, chest pain, severe headache or any other concerns

## 2019-03-11 NOTE — LETTER
March 11, 2019      [address] [place]   [place] [address]  Phone: [phone]  Fax: [phone]       Patient: Judie Schmitz   YOB: 1980  Date of Visit: 03/11/2019    To Whom It May Concern:    Josephine Schmitz  was at [facility] on 03/11/2019.     She may return to work on 3/13/19 with no restrictions. If you have any questions or concerns, or if I can be of further assistance, please do not hesitate to contact me.    Sincerely,    Joyce Francisco, DO

## 2019-03-13 ENCOUNTER — PATIENT MESSAGE (OUTPATIENT)
Dept: FAMILY MEDICINE | Facility: CLINIC | Age: 39
End: 2019-03-13

## 2019-03-13 ENCOUNTER — PATIENT MESSAGE (OUTPATIENT)
Dept: RHEUMATOLOGY | Facility: CLINIC | Age: 39
End: 2019-03-13

## 2019-03-19 DIAGNOSIS — F90.0 ADHD (ATTENTION DEFICIT HYPERACTIVITY DISORDER), INATTENTIVE TYPE: ICD-10-CM

## 2019-03-19 RX ORDER — DEXTROAMPHETAMINE SACCHARATE, AMPHETAMINE ASPARTATE, DEXTROAMPHETAMINE SULFATE AND AMPHETAMINE SULFATE 5; 5; 5; 5 MG/1; MG/1; MG/1; MG/1
1 TABLET ORAL 2 TIMES DAILY
Qty: 60 TABLET | Refills: 0 | Status: SHIPPED | OUTPATIENT
Start: 2019-03-19 | End: 2019-04-30 | Stop reason: SDUPTHER

## 2019-03-27 ENCOUNTER — OFFICE VISIT (OUTPATIENT)
Dept: GASTROENTEROLOGY | Facility: CLINIC | Age: 39
End: 2019-03-27
Payer: COMMERCIAL

## 2019-03-27 VITALS
DIASTOLIC BLOOD PRESSURE: 82 MMHG | HEIGHT: 64 IN | SYSTOLIC BLOOD PRESSURE: 119 MMHG | WEIGHT: 172.31 LBS | BODY MASS INDEX: 29.42 KG/M2

## 2019-03-27 DIAGNOSIS — R10.9 ABDOMINAL CRAMPING: ICD-10-CM

## 2019-03-27 DIAGNOSIS — Z83.719 FAMILY HISTORY OF COLONIC POLYPS: ICD-10-CM

## 2019-03-27 DIAGNOSIS — K58.0 IRRITABLE BOWEL SYNDROME WITH DIARRHEA: Primary | ICD-10-CM

## 2019-03-27 DIAGNOSIS — R15.2 FECAL URGENCY: ICD-10-CM

## 2019-03-27 PROCEDURE — 99999 PR PBB SHADOW E&M-EST. PATIENT-LVL II: CPT | Mod: PBBFAC,,, | Performed by: NURSE PRACTITIONER

## 2019-03-27 PROCEDURE — 99203 OFFICE O/P NEW LOW 30 MIN: CPT | Mod: S$GLB,,, | Performed by: NURSE PRACTITIONER

## 2019-03-27 PROCEDURE — 3008F PR BODY MASS INDEX (BMI) DOCUMENTED: ICD-10-PCS | Mod: CPTII,S$GLB,, | Performed by: NURSE PRACTITIONER

## 2019-03-27 PROCEDURE — 99999 PR PBB SHADOW E&M-EST. PATIENT-LVL II: ICD-10-PCS | Mod: PBBFAC,,, | Performed by: NURSE PRACTITIONER

## 2019-03-27 PROCEDURE — 99203 PR OFFICE/OUTPT VISIT, NEW, LEVL III, 30-44 MIN: ICD-10-PCS | Mod: S$GLB,,, | Performed by: NURSE PRACTITIONER

## 2019-03-27 PROCEDURE — 3008F BODY MASS INDEX DOCD: CPT | Mod: CPTII,S$GLB,, | Performed by: NURSE PRACTITIONER

## 2019-03-27 NOTE — PROGRESS NOTES
Subjective:       Patient ID: Judie Schmitz is a 38 y.o. female.    Chief Complaint: Irritable Bowel Syndrome    HPI  Reports 8-10 years of IBS complaints with episodic severe abdominal pain and cramping with resulting urgent diarrhea.  She will have multiple episodes of urgent diarrhea lasting throughout 1-2 days when this occurs.  She will get some relief with using bentyl.  She will have abdominal distention and discomfort even with walking.  If she is not sitting, will have urgency for bowel movements through most of the day even with use of bentyl.  Her episodes are triggered by stress and anxiety ( for which she is treated by another provider) but can also occur without stress.  She has had to miss days of work related to her complaints.  Reports a severe episode last week lasting 2-3 days.    She denies blood with bowel movements or black stools.  No nausea or vomiting.  Apart from this IBS episodes, she has regular bowel habit and no abdominal pain.  She has not had colonoscopy.  Was previously treated by Dr. Garcia.  No family history of IBD or colon cancer.  Father with colon polyps discovered at age 50.           Review of Systems   Constitutional: Negative for activity change, appetite change, fatigue, fever and unexpected weight change.   Respiratory: Negative for shortness of breath.    Cardiovascular: Negative for chest pain.   Gastrointestinal: Positive for abdominal distention, abdominal pain and diarrhea. Negative for blood in stool, constipation, nausea and vomiting.   Musculoskeletal: Positive for back pain.   Skin: Negative.    Neurological: Negative.    Psychiatric/Behavioral: The patient is nervous/anxious.        Objective:      Physical Exam   Constitutional: She is oriented to person, place, and time. She appears well-developed and well-nourished. No distress.   Eyes: No scleral icterus.   Cardiovascular: Normal rate.   Pulmonary/Chest: Effort normal.   Abdominal: Soft.    Neurological: She is alert and oriented to person, place, and time.   Skin: Skin is warm and dry. She is not diaphoretic.   Psychiatric: She has a normal mood and affect. Her behavior is normal. Judgment and thought content normal.   Vitals reviewed.      Assessment:       1. Irritable bowel syndrome with diarrhea    2. Abdominal cramping    3. Fecal urgency    4. Family history of colonic polyps        Plan:         Judie was seen today for irritable bowel syndrome.    Diagnoses and all orders for this visit:    Irritable bowel syndrome with diarrhea    Abdominal cramping    Fecal urgency    Family history of colonic polyps         Continue bentyl as needed.  Continue follow up for anxiety treatment.    Will need colonoscopy this year at age 39 due to family history of colon polyps in her father.

## 2019-04-10 ENCOUNTER — OFFICE VISIT (OUTPATIENT)
Dept: SLEEP MEDICINE | Facility: CLINIC | Age: 39
End: 2019-04-10
Payer: COMMERCIAL

## 2019-04-10 VITALS
HEART RATE: 77 BPM | DIASTOLIC BLOOD PRESSURE: 76 MMHG | WEIGHT: 172.69 LBS | BODY MASS INDEX: 29.48 KG/M2 | HEIGHT: 64 IN | SYSTOLIC BLOOD PRESSURE: 122 MMHG

## 2019-04-10 DIAGNOSIS — G47.00 INSOMNIA, UNSPECIFIED TYPE: Primary | ICD-10-CM

## 2019-04-10 PROCEDURE — 99204 PR OFFICE/OUTPT VISIT, NEW, LEVL IV, 45-59 MIN: ICD-10-PCS | Mod: S$GLB,,, | Performed by: PSYCHIATRY & NEUROLOGY

## 2019-04-10 PROCEDURE — 3008F PR BODY MASS INDEX (BMI) DOCUMENTED: ICD-10-PCS | Mod: CPTII,S$GLB,, | Performed by: PSYCHIATRY & NEUROLOGY

## 2019-04-10 PROCEDURE — 99999 PR PBB SHADOW E&M-EST. PATIENT-LVL III: CPT | Mod: PBBFAC,,, | Performed by: PSYCHIATRY & NEUROLOGY

## 2019-04-10 PROCEDURE — 3008F BODY MASS INDEX DOCD: CPT | Mod: CPTII,S$GLB,, | Performed by: PSYCHIATRY & NEUROLOGY

## 2019-04-10 PROCEDURE — 99204 OFFICE O/P NEW MOD 45 MIN: CPT | Mod: S$GLB,,, | Performed by: PSYCHIATRY & NEUROLOGY

## 2019-04-10 PROCEDURE — 99999 PR PBB SHADOW E&M-EST. PATIENT-LVL III: ICD-10-PCS | Mod: PBBFAC,,, | Performed by: PSYCHIATRY & NEUROLOGY

## 2019-04-10 RX ORDER — ZOLPIDEM TARTRATE 5 MG/1
TABLET ORAL
Qty: 30 TABLET | Refills: 2 | Status: SHIPPED | OUTPATIENT
Start: 2019-04-10 | End: 2019-07-10 | Stop reason: SDUPTHER

## 2019-04-10 NOTE — PROGRESS NOTES
Judie Schmitz  was seen at the request of  No ref. provider found for sleep evaluation.    04/10/2019 INITIAL HISTORY OF PRESENT ILLNESS:  Judie Schmitz is a 38 y.o. female is here to be evaluated for a sleep disorder.       CHIEF COMPLAINT:      The patient has mentioned difficulty falling and staying asleep.    The patient states that she has already made some changes in regard to sleep hygiene, making sure that the bedroom is dark, features comfortable temperature and humidity level. The patient does watch TV and read in bed. she goes to bed before she is sleepy and stays in bed if not asleep within 30 minutes. she avoids clock watching and tries not to worry about her ability to fall asleep.    She would sometimes wake up with a headache.    She has been using jake for sleep with muscle relaxation and breathing  + ADD - taking Adderall.     The patient has tried the following sleeping aids: Lunesta, Ambien and Trazodone   Trazodone - long time to work that lingered  Vystaril - did not work  Seroquel - did not help  Klonopin - 0.5 mg - did not help much, actually taking for anxiety.  Elavil - did not help much  Ambien - worked years ago.  Lunesta - bad taste stopped her from taking it.    Ambien 5 mg.    + her  is a snorer.Tried ceiling fan and sound machine. Highway nearby.    The patient's complaints include excessive daytime sleepiness and excessive daytime fatigue.    Judie Schmitz denied  snoring,  witnessed breathing pauses,  gasping for air in sleep and interrupted sleep.    Pitch k and cool bedroom otherwise.    + h/o anxiety      EPWORTH SLEEPINESS SCALE 4/10/2019   Sitting and reading 1   Watching TV 1   Sitting, inactive in a public place (e.g. a theatre or a meeting) 0   As a passenger in a car for an hour without a break 0   Lying down to rest in the afternoon when circumstances permit 2   Sitting and talking to someone 0   Sitting quietly after a lunch without alcohol  0   In a car, while stopped for a few minutes in traffic 0   Total score 4       PHQ9 4/10/2019   Little interest or pleasure in doing things: Not at all   Feeling down, depressed or hopeless: Not at all   Trouble falling asleep, staying asleep, or sleeping too much: Nearly every day   Feeling tired or having little energy: Nearly every day   Poor appetite or overeating: Not at all   Feeling bad about yourself- or that you are a failure or have let yourself or family down Not at all   Trouble concentrating on things, such as reading the newspaper or watching television: Several days   Moving or speaking so slowly that other people could have noticed. Or the opposite- being so fidgety or restless that you have been moving around a lot more than usual: Not at all   Thoughts that you would be better off dead or hurting yourself in some way: Not at all   If you indicated you have experienced any of the aforementioned problems, how difficult have these problems made it for you to do your work, take care of things at home or get along with other people? Somewhat difficult   Total Score 7     GAD7 4/10/2019   Feeling nervous, anxious, on edge More than half the days   Not being able to stop or control worrying More than half the days   Worrying too much about different things More than half the days   Trouble relaxing More than half the days   Being so restless that its hard to sit still More than half the days   Becoming easily annoyed or irritable Several days   Feeling afraid as if something awful might happen Not at all   If you marked you are experiencing any of the aforementioned problems, how difficult have these made it for you to do your work, take care of things at home, or get along with other people? Somewhat difficult   IVANA-7 Score 11         SLEEP ROUTINE AND LIFESTYLE 04/10/2019 :  Sleep Clinic New Patient 4/10/2019   What time do you go to bed on a week day? (Give a range) 9:30p - to unwind, reading, TV,  not working   What time do you go to bed on a day off? (Give a range) 11:00p - asleep   How long does it take you to fall asleep? (Give a range) 1 hour   On average, how many times per night do you wake up? 4   How long does it take you to fall back into sleep? (Give a range) up to 1-2 hours  Sometimes right away   What time do you wake up to start your day on a week day? (Give a range) 6:30a   What time do you wake up to start your day on a day off? (Give a range) 9:00a   What time do you get out of bed? (Give a range) after her alarm goes off   On average, how many hours do you sleep? 4-5 hours   On average, how many naps do you take per day? 0   Rate your sleep quality from 0 to 5 (0-poor, 5-great). 1   Have you experienced:  Weight gain?   How much weight have you lost or gained (in lbs.) in the last year? 12lbs   On average, how many times per night do you go to the bathroom?  2   Have you ever had a sleep study/CPAP machine/surgery for sleep apnea? No   Have you ever had a CPAP machine for sleep apnea? No   Have you ever had surgery for sleep apnea? No       Sleep Clinic ROS  4/10/2019   Difficulty breathing through the nose?  No   Sore throat or dry mouth in the morning? No   Irregular or very fast heart beat?  No   Shortness of breath?  No   Acid reflux? No   Body aches and pains?  Yes   Morning headaches? Sometimes   Dizziness? No   Mood changes?  Sometimes   Do you exercise?  Sometimes   Do you feel like moving your legs a lot?  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  Bed partner: her  - snorder  Exercise routine:   Caffeine:  Coffee   In AM  No ETOH  No heavy meals at night,     PREVIOUS SLEEP STUDIES:     none      DME:       PAST MEDICAL HISTORY:    Active Ambulatory Problems     Diagnosis Date Noted    Irritable bowel syndrome  with diarrhea     ADHD (attention deficit hyperactivity disorder), inattentive type     Insomnia 2017    Overweight (BMI 25.0-29.9) 2017    Excessive sweating 2018    Chronic midline low back pain without sciatica 2018    Raynaud's disease     Fecal urgency 2019     Resolved Ambulatory Problems     Diagnosis Date Noted    Anxiety and depression     Cervical cancer screening 2017    Rib pain on left side 2017    Encounter for surveillance of vaginal ring hormonal contraceptive device 10/31/2017    Fatigue 2017    Screening for lipid disorders 2017    Weight gain 2017    Chronic pain of left knee 2018     Past Medical History:   Diagnosis Date    Abnormal Pap smear of cervix age 34    ADHD (attention deficit hyperactivity disorder), inattentive type 2007    Anxiety and depression     IBS (irritable bowel syndrome)     Insomnia     Pregnancy     Raynaud's disease                 PAST SURGICAL HISTORY:    Past Surgical History:   Procedure Laterality Date     SECTION      x2 2002-10/21/2013         FAMILY HISTORY:                Family History   Problem Relation Age of Onset    Hypertension Mother     COPD Mother 50    Rheum arthritis Mother     Colon polyps Father     Stroke Maternal Grandmother     Pneumonia Maternal Grandmother         passed age 88    Cancer Maternal Grandfather         brain tumor dont know what age he passed    Suicide Paternal Grandfather         passed in his 50's    No Known Problems Sister     Montoya syndrome Sister     Heart disease Sister     Thyroid disease Sister     Breast cancer Paternal Aunt     Colon cancer Neg Hx     Ovarian cancer Neg Hx        SOCIAL HISTORY:          Tobacco:   Social History     Tobacco Use   Smoking Status Never Smoker   Smokeless Tobacco Never Used       alcohol use:    Social History     Substance and Sexual Activity   Alcohol Use Yes     "Comment: occasional                   ALLERGIES:  Review of patient's allergies indicates:  No Known Allergies    CURRENT MEDICATIONS:    Current Outpatient Medications   Medication Sig Dispense Refill    aluminum chloride (DRYSOL DAB-O-MATIC) 20 % external solution Apply topically every evening. 60 mL 0    cyclobenzaprine (FLEXERIL) 10 MG tablet Take 1 tablet (10 mg total) by mouth 3 (three) times daily as needed for Muscle spasms. 90 tablet 1    dextroamphetamine-amphetamine (ADDERALL) 20 mg tablet Take 1 tablet by mouth 2 (two) times daily. 60 tablet 0    dicyclomine (BENTYL) 10 MG capsule Take 10 mg by mouth 4 (four) times daily as needed (IBS symptoms).      etonogestrel-ethinyl estradiol (NUVARING) 0.12-0.015 mg/24 hr vaginal ring PLACE 1 RING VAGINALLY EVERY 28 DAYS 3 each 3    clonazePAM (KLONOPIN) 0.5 MG disintegrating tablet Take 1 tablet (0.5 mg total) by mouth nightly as needed. 30 tablet 0     No current facility-administered medications for this visit.                       REVIEW OF SYSTEMS:   Sleep related symptoms as per HPI    denies weight gain  Denies dyspnea  Denies palpitations  Denies acid reflux   Denies polyuria  Denies  mood diturbance  Denies  anemia  Denies  muscle pain  Denies  Gait imbalance    Otherwise, a balance of 10 systems reviewed is negative.    PHYSICAL EXAM:  /76   Pulse 77   Ht 5' 4" (1.626 m)   Wt 78.3 kg (172 lb 11.2 oz)   BMI 29.64 kg/m²   GENERAL: Normal development, well groomed.  HEENT:   HEENT:  Conjunctivae are non-erythematous; Pupils equal, round, and reactive to light; Nose is symmetrical; Nasal mucosa is pink and moist; Septum is midline; Inferior turbinates are normal; Nasal airflow is normal; Posterior pharynx is pink; Modified Mallampati:II; Posterior palate is low; Tonsils absent; Uvula is reddened;Tongue is normal; Dentition is fair; No TMJ tenderness; Jaw opening and protrusion without click and without discomfort.  NECK: Supple. Neck " circumference is  inches. No thyromegaly. No palpable nodes.     SKIN: On face and neck: No abrasions, no rashes, no lesions.  No subcutaneous nodules are palpable.  RESPIRATORY: Chest is clear to auscultation.  Normal chest expansion and non-labored breathing at rest.  CARDIOVASCULAR: Normal S1, S2.  No murmurs, gallops or rubs. No carotid bruits bilaterally.  No edema. No clubbing. No cyanosis.    NEURO: Oriented to time, place and person. Normal attention span and concentration. Gait normal.    PSYCH: Affect is full. Mood is normal  MUSCULOSKELETAL: Moves 4 extremities. Gait normal.         Using [facility]: no      ASSESSMENT:    Insomnia NEC. Multi-factorial -  Insomnia, staying in bed excess time in bed, poor sleep hygiene, and likely paradoxical insomnia play a role                PLAN:    Following recommendations were given in the AVS: \  Check progesterone  Go to bed when sleepy  Unwind outside of bed\  Leave bed when can not return to sleep - TV in low light, no blue lamp.  Wake up same time even on weekends.  [facility] - [facility] (En[facility])  Online [facility] - [facility] - can look  Ambien 5 mg - please dont take with Kloniopin    More than 15 minutes of this 30 minutes visit was spent in counseling: during our discussion today, we talked about the etiology of  ISRA as well as the potential ramifications of untreated sleep apnea, which could include daytime sleepiness, hypertension, heart disease and/or stroke.  We discussed potential treatment options, which could include weight loss, body positioning, continuous positive airway pressure (CPAP), or referral for surgical consideration. Meanwhile, she  is urged to avoid supine sleep, weight gain and alcoholic beverages since all of these can worsen ISRA.     Precautions: The patient was advised to abstain from driving should he feel sleepy or drowsy.    Follow up: MD/NP  after the sleep study has been completed.     Thank you for allowing me the opportunity to  participate in the care of your patient.    This visit summary will be sent to referring provider via inbasket

## 2019-04-10 NOTE — PATIENT INSTRUCTIONS
Check progesterone  Go to bed when sleepy  Unwind outside of bed\  Leave bed when can not return to sleep - TV in low light, no blue lamp.  Wake up same time even on weekends.    [facility] - [facility] ([facility])     Online [facility] - [facility] - can look     Ambien 5 mg - please dont take with Kloniopin

## 2019-04-30 DIAGNOSIS — F90.0 ADHD (ATTENTION DEFICIT HYPERACTIVITY DISORDER), INATTENTIVE TYPE: ICD-10-CM

## 2019-04-30 RX ORDER — DEXTROAMPHETAMINE SACCHARATE, AMPHETAMINE ASPARTATE, DEXTROAMPHETAMINE SULFATE AND AMPHETAMINE SULFATE 5; 5; 5; 5 MG/1; MG/1; MG/1; MG/1
1 TABLET ORAL 2 TIMES DAILY
Qty: 60 TABLET | Refills: 0 | Status: SHIPPED | OUTPATIENT
Start: 2019-04-30 | End: 2019-06-27 | Stop reason: SDUPTHER

## 2019-05-07 ENCOUNTER — PATIENT MESSAGE (OUTPATIENT)
Dept: FAMILY MEDICINE | Facility: CLINIC | Age: 39
End: 2019-05-07

## 2019-05-07 RX ORDER — METHYLPREDNISOLONE 4 MG/1
TABLET ORAL
Qty: 1 PACKAGE | Refills: 0 | Status: SHIPPED | OUTPATIENT
Start: 2019-05-07 | End: 2019-05-28

## 2019-05-28 DIAGNOSIS — F90.0 ADHD (ATTENTION DEFICIT HYPERACTIVITY DISORDER), INATTENTIVE TYPE: ICD-10-CM

## 2019-05-29 RX ORDER — DEXTROAMPHETAMINE SACCHARATE, AMPHETAMINE ASPARTATE, DEXTROAMPHETAMINE SULFATE AND AMPHETAMINE SULFATE 5; 5; 5; 5 MG/1; MG/1; MG/1; MG/1
1 TABLET ORAL 2 TIMES DAILY
Qty: 60 TABLET | Refills: 0 | OUTPATIENT
Start: 2019-05-29

## 2019-05-29 NOTE — TELEPHONE ENCOUNTER
Judie desire refill of Nuvaring, Next annual scheduled 6/4/2019 with  Dr Denny.  Patient Active Problem List   Diagnosis    Irritable bowel syndrome with diarrhea    ADHD (attention deficit hyperactivity disorder), inattentive type    Insomnia    Overweight (BMI 25.0-29.9)    Excessive sweating    Chronic midline low back pain without sciatica    Raynaud's disease    Fecal urgency     Prior to Admission medications    Medication Sig Start Date End Date Taking? Authorizing Provider   aluminum chloride (DRYSOL DAB-O-MATIC) 20 % external solution Apply topically every evening. 3/22/18   Ольга Cesar NP   clonazePAM (KLONOPIN) 0.5 MG disintegrating tablet Take 1 tablet (0.5 mg total) by mouth nightly as needed. 2/13/19 3/15/19  Arjun Villavicencio MD   cyclobenzaprine (FLEXERIL) 10 MG tablet Take 1 tablet (10 mg total) by mouth 3 (three) times daily as needed for Muscle spasms. 2/22/18   Ольга Cesar NP   dextroamphetamine-amphetamine (ADDERALL) 20 mg tablet Take 1 tablet by mouth 2 (two) times daily. 4/30/19   Ольга Cesar NP   dicyclomine (BENTYL) 10 MG capsule Take 10 mg by mouth 4 (four) times daily as needed (IBS symptoms).    Historical Provider, MD   etonogestrel-ethinyl estradiol (NUVARING) 0.12-0.015 mg/24 hr vaginal ring PLACE 1 RING VAGINALLY EVERY 28 DAYS 10/31/17   Jigar Denny MD   methylPREDNISolone (MEDROL DOSEPACK) 4 mg tablet use as directed 5/7/19 5/28/19  Ольга Cesar NP   zolpidem (AMBIEN) 5 MG Tab Ambien 1 pill PO QHS PRN insomnia 4/10/19   Shanda Garcia MD

## 2019-05-30 RX ORDER — ETONOGESTREL AND ETHINYL ESTRADIOL VAGINAL RING .015; .12 MG/D; MG/D
RING VAGINAL
Qty: 1 EACH | Refills: 0 | Status: SHIPPED | OUTPATIENT
Start: 2019-05-30 | End: 2019-07-09 | Stop reason: SDUPTHER

## 2019-06-03 DIAGNOSIS — Z12.31 ENCOUNTER FOR SCREENING MAMMOGRAM FOR MALIGNANT NEOPLASM OF BREAST: Primary | ICD-10-CM

## 2019-06-17 ENCOUNTER — PATIENT MESSAGE (OUTPATIENT)
Dept: SLEEP MEDICINE | Facility: CLINIC | Age: 39
End: 2019-06-17

## 2019-06-27 ENCOUNTER — OFFICE VISIT (OUTPATIENT)
Dept: FAMILY MEDICINE | Facility: CLINIC | Age: 39
End: 2019-06-27
Payer: COMMERCIAL

## 2019-06-27 VITALS
OXYGEN SATURATION: 100 % | HEART RATE: 101 BPM | HEIGHT: 64 IN | SYSTOLIC BLOOD PRESSURE: 130 MMHG | DIASTOLIC BLOOD PRESSURE: 84 MMHG | TEMPERATURE: 99 F | BODY MASS INDEX: 29.77 KG/M2 | WEIGHT: 174.38 LBS | RESPIRATION RATE: 18 BRPM

## 2019-06-27 DIAGNOSIS — Z23 NEED FOR TDAP VACCINATION: ICD-10-CM

## 2019-06-27 DIAGNOSIS — F90.0 ADHD (ATTENTION DEFICIT HYPERACTIVITY DISORDER), INATTENTIVE TYPE: Primary | ICD-10-CM

## 2019-06-27 PROCEDURE — 99213 PR OFFICE/OUTPT VISIT, EST, LEVL III, 20-29 MIN: ICD-10-PCS | Mod: 25,S$GLB,, | Performed by: NURSE PRACTITIONER

## 2019-06-27 PROCEDURE — 90715 TDAP VACCINE 7 YRS/> IM: CPT | Mod: S$GLB,,, | Performed by: NURSE PRACTITIONER

## 2019-06-27 PROCEDURE — 90471 IMMUNIZATION ADMIN: CPT | Mod: S$GLB,,, | Performed by: NURSE PRACTITIONER

## 2019-06-27 PROCEDURE — 90715 TDAP VACCINE GREATER THAN OR EQUAL TO 7YO IM: ICD-10-PCS | Mod: S$GLB,,, | Performed by: NURSE PRACTITIONER

## 2019-06-27 PROCEDURE — 90471 TDAP VACCINE GREATER THAN OR EQUAL TO 7YO IM: ICD-10-PCS | Mod: S$GLB,,, | Performed by: NURSE PRACTITIONER

## 2019-06-27 PROCEDURE — 99999 PR PBB SHADOW E&M-EST. PATIENT-LVL V: ICD-10-PCS | Mod: PBBFAC,,, | Performed by: NURSE PRACTITIONER

## 2019-06-27 PROCEDURE — 99213 OFFICE O/P EST LOW 20 MIN: CPT | Mod: 25,S$GLB,, | Performed by: NURSE PRACTITIONER

## 2019-06-27 PROCEDURE — 99999 PR PBB SHADOW E&M-EST. PATIENT-LVL V: CPT | Mod: PBBFAC,,, | Performed by: NURSE PRACTITIONER

## 2019-06-27 RX ORDER — DEXTROAMPHETAMINE SACCHARATE, AMPHETAMINE ASPARTATE, DEXTROAMPHETAMINE SULFATE AND AMPHETAMINE SULFATE 5; 5; 5; 5 MG/1; MG/1; MG/1; MG/1
1 TABLET ORAL 2 TIMES DAILY
Qty: 60 TABLET | Refills: 0 | Status: SHIPPED | OUTPATIENT
Start: 2019-06-27 | End: 2019-08-09 | Stop reason: SDUPTHER

## 2019-06-27 NOTE — PROGRESS NOTES
Subjective:       Patient ID: Judie cShmitz is a 39 y.o. female.    Chief Complaint: Medication Refill (Adderall )    Patient is a 39-year-old white female with ADHD, chronic intermittent low back pain, excessive sweating, IBS, Raynaud Disease followed by [facility] and insomnia that is here today for 3 month follow-up.     Patient has ADHD and has been taking Adderall 20 mg twice daily since March 2018.  She reports the dose is working well without side effects.  Patient works as a medical assistant for podiatrist. She reports that the Generic Adderall filled by [facility] is a white pill that causes increased headaches - she would like the pink/peach pill of that dose - advised patient to speak with pharmacist but I am fairly certain that [facility] uses the  of generic Adderall that is the light peach tablet.     Patient has Raynaud Disease being worked up by Rheumatology.         Current Outpatient Medications   Medication Sig Dispense Refill    aluminum chloride (DRYSOL DAB-O-MATIC) 20 % external solution Apply topically every evening. 60 mL 0    clonazePAM (KLONOPIN) 0.5 MG disintegrating tablet Take 1 tablet (0.5 mg total) by mouth nightly as needed. 30 tablet 0    cyclobenzaprine (FLEXERIL) 10 MG tablet Take 1 tablet (10 mg total) by mouth 3 (three) times daily as needed for Muscle spasms. 90 tablet 1    dextroamphetamine-amphetamine (ADDERALL) 20 mg tablet Take 1 tablet by mouth 2 (two) times daily. 60 tablet 0    dicyclomine (BENTYL) 10 MG capsule Take 10 mg by mouth 4 (four) times daily as needed (IBS symptoms).      etonogestrel-ethinyl estradiol (NUVARING) 0.12-0.015 mg/24 hr vaginal ring PLACE 1 RING VAGINALLY EVERY 28 DAYS 1 each 0    zolpidem (AMBIEN) 5 MG Tab Ambien 1 pill PO QHS PRN insomnia 30 tablet 2     No current facility-administered medications for this visit.        Past Medical History:   Diagnosis Date    Abnormal Pap smear of cervix age 34    no  treatment; repeat PAPs normal    ADHD (attention deficit hyperactivity disorder), inattentive type 2007    diagnosed by Dr. Denny - taking Adderall 10 mg twice daily from 2007 to  - increased Adderall to 20 mg twice daily until  - patient quit school and got off med - started back on Adderall 10 in  but then off med when had baby in  - has not been on med since having baby in .    Anxiety and depression     IBS (irritable bowel syndrome)     Insomnia     Pregnancy         Raynaud's disease     followed by Rheumatology Dr. Villavicencio       Past Surgical History:   Procedure Laterality Date     SECTION      x2 2002-10/21/2013       Family History   Problem Relation Age of Onset    Hypertension Mother     COPD Mother 50    Rheum arthritis Mother     Colon polyps Father     Stroke Maternal Grandmother     Pneumonia Maternal Grandmother         passed age 88    Cancer Maternal Grandfather         brain tumor dont know what age he passed    Suicide Paternal Grandfather         passed in his 50's    No Known Problems Sister     Montoya syndrome Sister     Heart disease Sister     Thyroid disease Sister     Breast cancer Paternal Aunt     Colon cancer Neg Hx     Ovarian cancer Neg Hx        Social History     Socioeconomic History    Marital status:      Spouse name: Not on file    Number of children: 2    Years of education: Not on file    Highest education level: Not on file   Occupational History    Occupation: Medical Assistant   Social Needs    Financial resource strain: Not on file    Food insecurity:     Worry: Not on file     Inability: Not on file    Transportation needs:     Medical: Not on file     Non-medical: Not on file   Tobacco Use    Smoking status: Never Smoker    Smokeless tobacco: Never Used   Substance and Sexual Activity    Alcohol use: Yes     Comment: occasional    Drug use: No    Sexual activity: Yes      Partners: Male     Birth control/protection: Inserts     Comment:    Lifestyle    Physical activity:     Days per week: Not on file     Minutes per session: Not on file    Stress: Not on file   Relationships    Social connections:     Talks on phone: Not on file     Gets together: Not on file     Attends [employer] service: Not on file     Active member of club or organization: Not on file     Attends meetings of clubs or organizations: Not on file     Relationship status: Not on file   Other Topics Concern    Not on file   Social History Narrative    Lives in [place].        Review of Systems   Constitutional: Negative for appetite change, chills, fatigue, fever and unexpected weight change.   HENT: Negative for congestion, ear pain, mouth sores, nosebleeds, postnasal drip, rhinorrhea, sinus pressure, sneezing, sore throat, trouble swallowing and voice change.    Eyes: Negative for photophobia, pain, discharge, redness, itching and visual disturbance.   Respiratory: Negative for cough, chest tightness and shortness of breath.    Cardiovascular: Negative for chest pain, palpitations and leg swelling.   Gastrointestinal: Negative for abdominal pain, blood in stool, constipation, diarrhea, nausea and vomiting.   Genitourinary: Negative for dysuria, frequency, hematuria and urgency.   Musculoskeletal: Negative for arthralgias, back pain, joint swelling and myalgias.   Skin: Negative for color change and rash.   Allergic/Immunologic: Negative for immunocompromised state.   Neurological: Negative for dizziness, seizures, syncope, weakness and headaches.   Hematological: Negative for adenopathy. Does not bruise/bleed easily.   Psychiatric/Behavioral: Negative for agitation, dysphoric mood, sleep disturbance and suicidal ideas. The patient is not nervous/anxious.          Objective:     Vitals:    06/27/19 1631 06/27/19 1648   BP: (!) 140/84 130/84   BP Location: Right arm    Patient Position: Sitting    BP  "Method: Medium (Manual)    Pulse: 101    Resp: 18    Temp: 98.8 °F (37.1 °C)    TempSrc: Oral    SpO2: 100%    Weight: 79.1 kg (174 lb 6.1 oz)    Height: 5' 4" (1.626 m)           Physical Exam   Constitutional: She is oriented to person, place, and time. She appears well-developed and well-nourished.   HENT:   Head: Normocephalic and atraumatic.   Right Ear: External ear normal.   Left Ear: External ear normal.   Nose: Nose normal.   Mouth/Throat: Oropharynx is clear and moist. No oropharyngeal exudate.   Eyes: Pupils are equal, round, and reactive to light. EOM are normal.   Neck: Normal range of motion. Neck supple. No tracheal deviation present. No thyromegaly present.   Cardiovascular: Normal rate, regular rhythm and normal heart sounds.   No murmur heard.  Pulmonary/Chest: Effort normal and breath sounds normal. No respiratory distress.   Abdominal: Soft. She exhibits no distension.   Musculoskeletal: Normal range of motion. She exhibits no edema.   Lymphadenopathy:     She has no cervical adenopathy.   Neurological: She is alert and oriented to person, place, and time. No cranial nerve deficit. Coordination normal.   Skin: Skin is warm and dry. No rash noted.   Psychiatric: She has a normal mood and affect.         Assessment:         ICD-10-CM ICD-9-CM   1. ADHD (attention deficit hyperactivity disorder), inattentive type F90.0 314.00   2. Need for Tdap vaccination Z23 V06.1       Plan:       ADHD (attention deficit hyperactivity disorder), inattentive type  -  Controlled on present medication.  Will call when she needs to next prescription or send a message over the portal.  Follow-up is in 3 months  -     dextroamphetamine-amphetamine (ADDERALL) 20 mg tablet; Take 1 tablet by mouth 2 (two) times daily.  Dispense: 60 tablet; Refill: 0    Need for Tdap vaccination  -     Tdap Vaccine      Follow up in about 3 months (around 9/27/2019) for med check.     Patient's Medications   New Prescriptions    No " medications on file   Previous Medications    ALUMINUM CHLORIDE (DRYSOL DAB-O-MATIC) 20 % EXTERNAL SOLUTION    Apply topically every evening.    CLONAZEPAM (KLONOPIN) 0.5 MG DISINTEGRATING TABLET    Take 1 tablet (0.5 mg total) by mouth nightly as needed.    CYCLOBENZAPRINE (FLEXERIL) 10 MG TABLET    Take 1 tablet (10 mg total) by mouth 3 (three) times daily as needed for Muscle spasms.    DICYCLOMINE (BENTYL) 10 MG CAPSULE    Take 10 mg by mouth 4 (four) times daily as needed (IBS symptoms).    ETONOGESTREL-ETHINYL ESTRADIOL (NUVARING) 0.12-0.015 MG/24 HR VAGINAL RING    PLACE 1 RING VAGINALLY EVERY 28 DAYS    ZOLPIDEM (AMBIEN) 5 MG TAB    Ambien 1 pill PO QHS PRN insomnia   Modified Medications    Modified Medication Previous Medication    DEXTROAMPHETAMINE-AMPHETAMINE (ADDERALL) 20 MG TABLET dextroamphetamine-amphetamine (ADDERALL) 20 mg tablet       Take 1 tablet by mouth 2 (two) times daily.    Take 1 tablet by mouth 2 (two) times daily.   Discontinued Medications    No medications on file

## 2019-07-09 ENCOUNTER — PATIENT MESSAGE (OUTPATIENT)
Dept: SLEEP MEDICINE | Facility: CLINIC | Age: 39
End: 2019-07-09

## 2019-07-09 RX ORDER — ETONOGESTREL AND ETHINYL ESTRADIOL VAGINAL RING .015; .12 MG/D; MG/D
RING VAGINAL
Qty: 1 EACH | Refills: 0 | Status: SHIPPED | OUTPATIENT
Start: 2019-07-09 | End: 2019-10-10 | Stop reason: SDUPTHER

## 2019-07-09 NOTE — TELEPHONE ENCOUNTER
Judie desire refill of Nuvating. Annual scheduled. Please advise.   Patient Active Problem List   Diagnosis    Irritable bowel syndrome with diarrhea    ADHD (attention deficit hyperactivity disorder), inattentive type    Insomnia    Overweight (BMI 25.0-29.9)    Excessive sweating    Chronic midline low back pain without sciatica    Raynaud's disease    Fecal urgency     Prior to Admission medications    Medication Sig Start Date End Date Taking? Authorizing Provider   aluminum chloride (DRYSOL DAB-O-MATIC) 20 % external solution Apply topically every evening. 3/22/18   Ольга Cesar NP   clonazePAM (KLONOPIN) 0.5 MG disintegrating tablet Take 1 tablet (0.5 mg total) by mouth nightly as needed. 2/13/19 6/27/21  Arjun Villavicencio MD   cyclobenzaprine (FLEXERIL) 10 MG tablet Take 1 tablet (10 mg total) by mouth 3 (three) times daily as needed for Muscle spasms. 2/22/18   Ольга Cesar NP   dextroamphetamine-amphetamine (ADDERALL) 20 mg tablet Take 1 tablet by mouth 2 (two) times daily. 6/27/19   Ольга Cesar NP   dicyclomine (BENTYL) 10 MG capsule Take 10 mg by mouth 4 (four) times daily as needed (IBS symptoms).    Historical Provider, MD   etonogestrel-ethinyl estradiol (NUVARING) 0.12-0.015 mg/24 hr vaginal ring PLACE 1 RING VAGINALLY EVERY 28 DAYS 5/30/19   Jigar Denny MD   zolpidem (AMBIEN) 5 MG Tab Ambien 1 pill PO QHS PRN insomnia 4/10/19   Shanda Garcia MD

## 2019-07-10 ENCOUNTER — TELEPHONE (OUTPATIENT)
Dept: SLEEP MEDICINE | Facility: CLINIC | Age: 39
End: 2019-07-10

## 2019-07-10 RX ORDER — ZOLPIDEM TARTRATE 5 MG/1
TABLET ORAL
Qty: 45 TABLET | Refills: 1 | Status: SHIPPED | OUTPATIENT
Start: 2019-07-10 | End: 2019-10-17 | Stop reason: SDUPTHER

## 2019-08-09 DIAGNOSIS — F90.0 ADHD (ATTENTION DEFICIT HYPERACTIVITY DISORDER), INATTENTIVE TYPE: ICD-10-CM

## 2019-08-09 RX ORDER — DEXTROAMPHETAMINE SACCHARATE, AMPHETAMINE ASPARTATE, DEXTROAMPHETAMINE SULFATE AND AMPHETAMINE SULFATE 5; 5; 5; 5 MG/1; MG/1; MG/1; MG/1
1 TABLET ORAL 2 TIMES DAILY
Qty: 60 TABLET | Refills: 0 | Status: SHIPPED | OUTPATIENT
Start: 2019-08-09 | End: 2019-09-06 | Stop reason: SDUPTHER

## 2019-09-06 DIAGNOSIS — F90.0 ADHD (ATTENTION DEFICIT HYPERACTIVITY DISORDER), INATTENTIVE TYPE: ICD-10-CM

## 2019-09-09 RX ORDER — DEXTROAMPHETAMINE SACCHARATE, AMPHETAMINE ASPARTATE, DEXTROAMPHETAMINE SULFATE AND AMPHETAMINE SULFATE 5; 5; 5; 5 MG/1; MG/1; MG/1; MG/1
1 TABLET ORAL 2 TIMES DAILY
Qty: 60 TABLET | Refills: 0 | Status: SHIPPED | OUTPATIENT
Start: 2019-09-09 | End: 2020-01-29 | Stop reason: SDUPTHER

## 2019-09-12 ENCOUNTER — PATIENT OUTREACH (OUTPATIENT)
Dept: ADMINISTRATIVE | Facility: OTHER | Age: 39
End: 2019-09-12

## 2019-09-12 ENCOUNTER — PATIENT MESSAGE (OUTPATIENT)
Dept: SLEEP MEDICINE | Facility: CLINIC | Age: 39
End: 2019-09-12

## 2019-09-17 ENCOUNTER — TELEPHONE (OUTPATIENT)
Dept: SLEEP MEDICINE | Facility: CLINIC | Age: 39
End: 2019-09-17

## 2019-09-17 NOTE — TELEPHONE ENCOUNTER
----- Message from Jeanne Jonah sent at 9/17/2019  2:31 PM CDT -----  Contact: MARGOTH EASLEY   Name of Who is Calling: MARGOTH EASLEY       What is the request in detail: Patient is requesting a call back. She states that she went to the [facility] today in error. She would like to get a sooner appointment for the [facility] if possible. I offered her an appointment at [facility] location she declined.       Can the clinic reply by [facility]: No      What Number to Call Back if not in [facility]:  [phone]

## 2019-10-08 ENCOUNTER — PATIENT OUTREACH (OUTPATIENT)
Dept: ADMINISTRATIVE | Facility: OTHER | Age: 39
End: 2019-10-08

## 2019-10-10 ENCOUNTER — TELEPHONE (OUTPATIENT)
Dept: SLEEP MEDICINE | Facility: CLINIC | Age: 39
End: 2019-10-10

## 2019-10-10 ENCOUNTER — OFFICE VISIT (OUTPATIENT)
Dept: OBSTETRICS AND GYNECOLOGY | Facility: CLINIC | Age: 39
End: 2019-10-10
Payer: COMMERCIAL

## 2019-10-10 VITALS
HEART RATE: 69 BPM | WEIGHT: 180 LBS | RESPIRATION RATE: 13 BRPM | BODY MASS INDEX: 30.73 KG/M2 | DIASTOLIC BLOOD PRESSURE: 64 MMHG | HEIGHT: 64 IN | SYSTOLIC BLOOD PRESSURE: 122 MMHG

## 2019-10-10 DIAGNOSIS — R10.2 PELVIC PAIN IN FEMALE: Primary | ICD-10-CM

## 2019-10-10 DIAGNOSIS — N94.10 DYSPAREUNIA, FEMALE: ICD-10-CM

## 2019-10-10 DIAGNOSIS — G47.00 INSOMNIA, UNSPECIFIED TYPE: ICD-10-CM

## 2019-10-10 PROCEDURE — 99999 PR PBB SHADOW E&M-EST. PATIENT-LVL III: ICD-10-PCS | Mod: PBBFAC,,, | Performed by: OBSTETRICS & GYNECOLOGY

## 2019-10-10 PROCEDURE — 99213 PR OFFICE/OUTPT VISIT, EST, LEVL III, 20-29 MIN: ICD-10-PCS | Mod: S$GLB,,, | Performed by: OBSTETRICS & GYNECOLOGY

## 2019-10-10 PROCEDURE — 99999 PR PBB SHADOW E&M-EST. PATIENT-LVL III: CPT | Mod: PBBFAC,,, | Performed by: OBSTETRICS & GYNECOLOGY

## 2019-10-10 PROCEDURE — 99213 OFFICE O/P EST LOW 20 MIN: CPT | Mod: S$GLB,,, | Performed by: OBSTETRICS & GYNECOLOGY

## 2019-10-10 RX ORDER — ETONOGESTREL AND ETHINYL ESTRADIOL VAGINAL RING .015; .12 MG/D; MG/D
RING VAGINAL
Qty: 1 EACH | Refills: 11 | Status: SHIPPED | OUTPATIENT
Start: 2019-10-10 | End: 2020-08-10 | Stop reason: SDUPTHER

## 2019-10-10 NOTE — TELEPHONE ENCOUNTER
----- Message from Kisha Cabrera sent at 10/4/2019  7:20 AM CDT -----  Contact: Self  Pt made contact via [facility] as follows:    Appointment Request From: Judie Schmitz    With Provider: Miroslava Stuart NP [[facility]]    Preferred Date Range: 10/8/2019 - 10/31/2019    Preferred Times: Monday Morning, Tuesday Morning, Wednesday Morning, Thursday Morning, Friday Morning    Reason for visit: Existing Patient    Comments:  Follow up on meds    Thank you.

## 2019-10-10 NOTE — PROGRESS NOTES
Subjective:       Patient ID: Judie Schmitz is a 39 y.o. female.    Chief Complaint:  Pelvic Pain (approx for one month, stopped birth control about same time) and Painful Harold      History of Present Illness  Patient presents complaining of pelvic pain.  Patient has stopped her NuvaRing when she ran out of her prescription back in August.  Patient states since then her menstrual cycles have been getting steadily worse.  Patient complains of pelvic pain and pain with intercourse.  Patient had a history of painful periods and cyst on her ovary before starting birth control.  Patient states her symptoms have been ago under control.  Patient would like to restart her NuvaRing.  Patient also suffers from insomnia and states that her primary care physician requested a progesterone level.  She is otherwise without gyn complaints.    Menstrual History:  OB History        2    Para   2    Term                AB        Living   2       SAB        TAB        Ectopic        Multiple        Live Births                    Menarche age:  Patient's last menstrual period was 2019 (approximate).         Review of Systems  Review of Systems   Constitutional: Negative for activity change, appetite change, chills, diaphoresis, fatigue, fever and unexpected weight change.   HENT: Negative for congestion, dental problem, drooling, ear discharge, ear pain, facial swelling, hearing loss, mouth sores, nosebleeds, postnasal drip, rhinorrhea, sinus pressure, sneezing, sore throat, tinnitus, trouble swallowing and voice change.    Eyes: Negative for photophobia, pain, discharge, redness, itching and visual disturbance.   Respiratory: Negative for apnea, cough, choking, chest tightness, shortness of breath, wheezing and stridor.    Cardiovascular: Negative for chest pain, palpitations and leg swelling.   Gastrointestinal: Negative for abdominal distention, abdominal pain, anal bleeding, blood in stool,  constipation, diarrhea, nausea, rectal pain and vomiting.   Endocrine: Negative for cold intolerance, heat intolerance, polydipsia, polyphagia and polyuria.   Genitourinary: Negative for decreased urine volume, difficulty urinating, dyspareunia, dysuria, enuresis, flank pain, frequency, genital sores, hematuria, menstrual problem, pelvic pain, urgency, vaginal bleeding, vaginal discharge and vaginal pain.   Musculoskeletal: Positive for back pain. Negative for arthralgias, gait problem, joint swelling, myalgias, neck pain and neck stiffness.   Skin: Negative for color change, pallor, rash and wound.   Allergic/Immunologic: Negative for environmental allergies, food allergies and immunocompromised state.   Neurological: Negative for dizziness, tremors, seizures, syncope, facial asymmetry, speech difficulty, weakness, light-headedness, numbness and headaches.   Hematological: Negative for adenopathy. Does not bruise/bleed easily.   Psychiatric/Behavioral: Negative for agitation, behavioral problems, confusion, decreased concentration, dysphoric mood, hallucinations, self-injury, sleep disturbance and suicidal ideas. The patient is not nervous/anxious and is not hyperactive.            Objective:      Physical Exam   Genitourinary: Rectal exam shows no external hemorrhoid. No labial fusion. There is no rash, tenderness, lesion or injury on the right labia. There is no rash, tenderness, lesion or injury on the left labia. Uterus is tender. Uterus is not deviated, not enlarged and not fixed. Cervix exhibits no motion tenderness, no discharge and no friability. Right adnexum displays no mass, no tenderness and no fullness. Left adnexum displays no mass, no tenderness and no fullness. No erythema, tenderness or bleeding in the vagina. No foreign body in the vagina. No signs of injury around the vagina. No vaginal discharge found.   Nursing note and vitals reviewed.          Assessment:        1. Pelvic pain in female    2.  Dyspareunia, female    3. Insomnia, unspecified type                Plan:         Judie was seen today for pelvic pain and painful intercourse.    Diagnoses and all orders for this visit:    Pelvic pain in female    Dyspareunia, female    Insomnia, unspecified type  -     Progesterone; Future    Other orders  -     etonogestrel-ethinyl estradiol (NUVARING) 0.12-0.015 mg/24 hr vaginal ring; PLACE 1 RING VAGINALLY EVERY 28 DAYS

## 2019-10-17 ENCOUNTER — PATIENT OUTREACH (OUTPATIENT)
Dept: ADMINISTRATIVE | Facility: OTHER | Age: 39
End: 2019-10-17

## 2019-10-17 ENCOUNTER — OFFICE VISIT (OUTPATIENT)
Dept: SLEEP MEDICINE | Facility: CLINIC | Age: 39
End: 2019-10-17
Payer: COMMERCIAL

## 2019-10-17 VITALS
BODY MASS INDEX: 30.83 KG/M2 | WEIGHT: 180.56 LBS | HEIGHT: 64 IN | DIASTOLIC BLOOD PRESSURE: 69 MMHG | SYSTOLIC BLOOD PRESSURE: 127 MMHG | HEART RATE: 81 BPM

## 2019-10-17 DIAGNOSIS — G47.00 INSOMNIA, UNSPECIFIED TYPE: Primary | ICD-10-CM

## 2019-10-17 PROCEDURE — 99214 OFFICE O/P EST MOD 30 MIN: CPT | Mod: S$GLB,,, | Performed by: NURSE PRACTITIONER

## 2019-10-17 PROCEDURE — 99214 PR OFFICE/OUTPT VISIT, EST, LEVL IV, 30-39 MIN: ICD-10-PCS | Mod: S$GLB,,, | Performed by: NURSE PRACTITIONER

## 2019-10-17 PROCEDURE — 99999 PR PBB SHADOW E&M-EST. PATIENT-LVL III: ICD-10-PCS | Mod: PBBFAC,,, | Performed by: NURSE PRACTITIONER

## 2019-10-17 PROCEDURE — 99999 PR PBB SHADOW E&M-EST. PATIENT-LVL III: CPT | Mod: PBBFAC,,, | Performed by: NURSE PRACTITIONER

## 2019-10-17 RX ORDER — ZOLPIDEM TARTRATE 5 MG/1
TABLET ORAL
Qty: 30 TABLET | Refills: 5 | Status: SHIPPED | OUTPATIENT
Start: 2019-10-17 | End: 2020-05-01 | Stop reason: SDUPTHER

## 2019-10-17 NOTE — PROGRESS NOTES
Judie Schmitz  was seen as f/u for mgt of insomnia, initial visit with me    Since seen she takes stillambien 5mg qhs, sometimes takes another dose when up in middle of night and up for day 5h later. On nights when sleep disrupted andhard to return to bed, she is very tired. Feels better when sleep is consolidated. Sleeps much better wayne e, when snoring spouse not in room. When up middle of night could take 1-2 hrs to return, may lie there. Sinus congestion can affect sleep. HAs to nap daytime when cold symptoms. Denies complex sleep behaviors.       HISTORY  04/10/2019 INITIAL HISTORY OF PRESENT ILLNESS:  Judie Schmitz is a 39 y.o. female is here to be evaluated for a sleep disorder.       CHIEF COMPLAINT:      The patient has mentioned difficulty falling and staying asleep.    The patient states that she has already made some changes in regard to sleep hygiene, making sure that the bedroom is dark, features comfortable temperature and humidity level. The patient does watch TV and read in bed. she goes to bed before she is sleepy and stays in bed if not asleep within 30 minutes. she avoids clock watching and tries not to worry about her ability to fall asleep.    She would sometimes wake up with a headache.    She has been using jake for sleep with muscle relaxation and breathing  + ADD - taking Adderall.     The patient has tried the following sleeping aids: Lunesta, Ambien and Trazodone   Trazodone - long time to work that lingered  Vystaril - did not work  Seroquel - did not help  Klonopin - 0.5 mg - did not help much, actually taking for anxiety.  Elavil - did not help much  Ambien - worked years ago.  Lunesta - bad taste stopped her from taking it.    Ambien 5 mg.    + her  is a snorer.Tried ceiling fan and sound machine. Highway nearby.    The patient's complaints include excessive daytime sleepiness and excessive daytime fatigue.    Judie Schmitz denied  snoring,  witnessed  breathing pauses,  gasping for air in sleep and interrupted sleep.    Pitch k and cool bedroom otherwise.    + h/o anxiety      EPWORTH SLEEPINESS SCALE 4/10/2019   Sitting and reading 1   Watching TV 1   Sitting, inactive in a public place (e.g. a theatre or a meeting) 0   As a passenger in a car for an hour without a break 0   Lying down to rest in the afternoon when circumstances permit 2   Sitting and talking to someone 0   Sitting quietly after a lunch without alcohol 0   In a car, while stopped for a few minutes in traffic 0   Total score 4         SLEEP ROUTINE AND LIFESTYLE 10/17/2019 :  Sleep Clinic New Patient 4/10/2019   What time do you go to bed on a week day? (Give a range) 9:30p - to unwind, reading, TV, not working   What time do you go to bed on a day off? (Give a range) 11:00p - asleep   How long does it take you to fall asleep? (Give a range) 1 hour   On average, how many times per night do you wake up? 4   How long does it take you to fall back into sleep? (Give a range) up to 1-2 hours  Sometimes right away   What time do you wake up to start your day on a week day? (Give a range) 6:30a   What time do you wake up to start your day on a day off? (Give a range) 9:00a   What time do you get out of bed? (Give a range) after her alarm goes off   On average, how many hours do you sleep? 4-5 hours   On average, how many naps do you take per day? 0   Rate your sleep quality from 0 to 5 (0-poor, 5-great). 1   Have you experienced:  Weight gain?   How much weight have you lost or gained (in lbs.) in the last year? 12lbs   On average, how many times per night do you go to the bathroom?  2   Have you ever had a sleep study/CPAP machine/surgery for sleep apnea? No   Have you ever had a CPAP machine for sleep apnea? No   Have you ever had surgery for sleep apnea? No     ROS 10/17/19  Difficulty breathing through the nose?  No   Sore throat or dry mouth in the morning? No   Irregular or very fast heart beat?   "No   Shortness of breath?  No   Acid reflux? No   Body aches and pains?  Yes   Morning headaches? Sometimes   Dizziness? No   Mood changes?  Sometimes   Do you exercise?  Sometimes   Do you feel like moving your legs a lot?  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 Otherwise a balance review of 10-systems is negative.     PHYSICAL EXAM:  /69 (BP Location: Left arm, Patient Position: Sitting, BP Method: Medium (Automatic))   Pulse 81   Ht 5' 4" (1.626 m)   Wt 81.9 kg (180 lb 8.9 oz)   LMP 09/23/2019 (Approximate)   BMI 30.99 kg/m²   GENERAL: Normal development, well groomed.        ASSESSMENT:    Insomnia NEC. Multi-factorial -  Insomnia, staying in bed excess time in bed, poor sleep hygiene, and likely paradoxical insomnia play a role      PLAN:  Continue good sleep habits, consider CBT-I (works in [place] [employer] Tue/Wed more flex schedule) for long-term solution insomnia  Ambien 5mg qhs continue +- adjunctive Mag 400mg qhs or melatonin 5mg .  Consider 6.25mg CR ambien future  Reduce spouse snoring, provided theravent and consider HST  RTC 6-mos. Do not drive sleepy  "

## 2019-10-24 ENCOUNTER — PATIENT OUTREACH (OUTPATIENT)
Dept: ADMINISTRATIVE | Facility: HOSPITAL | Age: 39
End: 2019-10-24

## 2019-11-05 ENCOUNTER — ANESTHESIA EVENT (OUTPATIENT)
Dept: SURGERY | Facility: OTHER | Age: 39
End: 2019-11-05
Payer: COMMERCIAL

## 2019-11-05 ENCOUNTER — HOSPITAL ENCOUNTER (OUTPATIENT)
Facility: OTHER | Age: 39
Discharge: HOME | End: 2019-11-05
Attending: EMERGENCY MEDICINE | Admitting: SPECIALIST
Payer: COMMERCIAL

## 2019-11-05 ENCOUNTER — ANESTHESIA (OUTPATIENT)
Dept: SURGERY | Facility: OTHER | Age: 39
End: 2019-11-05
Payer: COMMERCIAL

## 2019-11-05 VITALS
BODY MASS INDEX: 31.24 KG/M2 | HEART RATE: 104 BPM | DIASTOLIC BLOOD PRESSURE: 58 MMHG | HEIGHT: 64 IN | WEIGHT: 183 LBS | TEMPERATURE: 99 F | OXYGEN SATURATION: 98 % | RESPIRATION RATE: 16 BRPM | SYSTOLIC BLOOD PRESSURE: 126 MMHG

## 2019-11-05 DIAGNOSIS — K35.80 ACUTE APPENDICITIS WITHOUT PERITONITIS: Primary | ICD-10-CM

## 2019-11-05 PROCEDURE — 37000008 HC ANESTHESIA 1ST 15 MINUTES: Performed by: SPECIALIST

## 2019-11-05 PROCEDURE — 63600175 PHARM REV CODE 636 W HCPCS: Performed by: SPECIALIST

## 2019-11-05 PROCEDURE — G0378 HOSPITAL OBSERVATION PER HR: HCPCS

## 2019-11-05 PROCEDURE — 63600175 PHARM REV CODE 636 W HCPCS: Performed by: EMERGENCY MEDICINE

## 2019-11-05 PROCEDURE — 25000003 PHARM REV CODE 250: Performed by: NURSE ANESTHETIST, CERTIFIED REGISTERED

## 2019-11-05 PROCEDURE — 88304 TISSUE SPECIMEN TO PATHOLOGY - SURGERY: ICD-10-PCS | Mod: 26,,, | Performed by: PATHOLOGY

## 2019-11-05 PROCEDURE — 71000033 HC RECOVERY, INTIAL HOUR: Performed by: SPECIALIST

## 2019-11-05 PROCEDURE — 25000003 PHARM REV CODE 250: Performed by: SPECIALIST

## 2019-11-05 PROCEDURE — 25000003 PHARM REV CODE 250: Performed by: ANESTHESIOLOGY

## 2019-11-05 PROCEDURE — 99285 EMERGENCY DEPT VISIT HI MDM: CPT | Mod: 25

## 2019-11-05 PROCEDURE — 63600175 PHARM REV CODE 636 W HCPCS: Performed by: ANESTHESIOLOGY

## 2019-11-05 PROCEDURE — 88304 TISSUE EXAM BY PATHOLOGIST: CPT | Performed by: PATHOLOGY

## 2019-11-05 PROCEDURE — 63600175 PHARM REV CODE 636 W HCPCS: Performed by: NURSE ANESTHETIST, CERTIFIED REGISTERED

## 2019-11-05 PROCEDURE — 31500 INSERT EMERGENCY AIRWAY: CPT | Performed by: NURSE ANESTHETIST, CERTIFIED REGISTERED

## 2019-11-05 PROCEDURE — 36000709 HC OR TIME LEV III EA ADD 15 MIN: Performed by: SPECIALIST

## 2019-11-05 PROCEDURE — 88304 TISSUE EXAM BY PATHOLOGIST: CPT | Mod: 26,,, | Performed by: PATHOLOGY

## 2019-11-05 PROCEDURE — 96374 THER/PROPH/DIAG INJ IV PUSH: CPT | Mod: 59

## 2019-11-05 PROCEDURE — 37000009 HC ANESTHESIA EA ADD 15 MINS: Performed by: SPECIALIST

## 2019-11-05 PROCEDURE — 27201423 OPTIME MED/SURG SUP & DEVICES STERILE SUPPLY: Performed by: SPECIALIST

## 2019-11-05 PROCEDURE — 36000708 HC OR TIME LEV III 1ST 15 MIN: Performed by: SPECIALIST

## 2019-11-05 RX ORDER — ONDANSETRON HYDROCHLORIDE 2 MG/ML
INJECTION, SOLUTION INTRAMUSCULAR; INTRAVENOUS
Status: DISCONTINUED | OUTPATIENT
Start: 2019-11-05 | End: 2019-11-05

## 2019-11-05 RX ORDER — MEPERIDINE HYDROCHLORIDE 25 MG/ML
12.5 INJECTION INTRAMUSCULAR; INTRAVENOUS; SUBCUTANEOUS ONCE AS NEEDED
Status: DISCONTINUED | OUTPATIENT
Start: 2019-11-05 | End: 2019-11-05 | Stop reason: HOSPADM

## 2019-11-05 RX ORDER — HYDROCODONE BITARTRATE AND ACETAMINOPHEN 5; 325 MG/1; MG/1
TABLET ORAL
Qty: 20 TABLET | Refills: 0 | Status: SHIPPED | OUTPATIENT
Start: 2019-11-05 | End: 2019-12-02

## 2019-11-05 RX ORDER — HYDROMORPHONE HYDROCHLORIDE 2 MG/ML
0.4 INJECTION, SOLUTION INTRAMUSCULAR; INTRAVENOUS; SUBCUTANEOUS EVERY 5 MIN PRN
Status: DISCONTINUED | OUTPATIENT
Start: 2019-11-05 | End: 2019-11-05 | Stop reason: HOSPADM

## 2019-11-05 RX ORDER — ROCURONIUM BROMIDE 10 MG/ML
INJECTION, SOLUTION INTRAVENOUS
Status: DISCONTINUED | OUTPATIENT
Start: 2019-11-05 | End: 2019-11-05

## 2019-11-05 RX ORDER — SODIUM CHLORIDE, SODIUM LACTATE, POTASSIUM CHLORIDE, CALCIUM CHLORIDE 600; 310; 30; 20 MG/100ML; MG/100ML; MG/100ML; MG/100ML
INJECTION, SOLUTION INTRAVENOUS CONTINUOUS PRN
Status: DISCONTINUED | OUTPATIENT
Start: 2019-11-05 | End: 2019-11-05

## 2019-11-05 RX ORDER — SODIUM CHLORIDE 9 MG/ML
INJECTION, SOLUTION INTRAVENOUS CONTINUOUS
Status: DISCONTINUED | OUTPATIENT
Start: 2019-11-05 | End: 2019-11-05 | Stop reason: HOSPADM

## 2019-11-05 RX ORDER — MORPHINE SULFATE 4 MG/ML
4 INJECTION, SOLUTION INTRAMUSCULAR; INTRAVENOUS EVERY 4 HOURS PRN
Status: DISCONTINUED | OUTPATIENT
Start: 2019-11-05 | End: 2019-11-05 | Stop reason: HOSPADM

## 2019-11-05 RX ORDER — MORPHINE SULFATE 2 MG/ML
2 INJECTION, SOLUTION INTRAMUSCULAR; INTRAVENOUS EVERY 4 HOURS PRN
Status: DISCONTINUED | OUTPATIENT
Start: 2019-11-05 | End: 2019-11-05 | Stop reason: HOSPADM

## 2019-11-05 RX ORDER — SUCCINYLCHOLINE CHLORIDE 20 MG/ML
INJECTION INTRAMUSCULAR; INTRAVENOUS
Status: DISCONTINUED | OUTPATIENT
Start: 2019-11-05 | End: 2019-11-05

## 2019-11-05 RX ORDER — SODIUM CHLORIDE 0.9 % (FLUSH) 0.9 %
3 SYRINGE (ML) INJECTION
Status: DISCONTINUED | OUTPATIENT
Start: 2019-11-05 | End: 2019-11-05 | Stop reason: HOSPADM

## 2019-11-05 RX ORDER — PROPOFOL 10 MG/ML
VIAL (ML) INTRAVENOUS
Status: DISCONTINUED | OUTPATIENT
Start: 2019-11-05 | End: 2019-11-05

## 2019-11-05 RX ORDER — ONDANSETRON 2 MG/ML
4 INJECTION INTRAMUSCULAR; INTRAVENOUS EVERY 12 HOURS PRN
Status: DISCONTINUED | OUTPATIENT
Start: 2019-11-05 | End: 2019-11-05 | Stop reason: HOSPADM

## 2019-11-05 RX ORDER — GLYCOPYRROLATE 0.2 MG/ML
INJECTION INTRAMUSCULAR; INTRAVENOUS
Status: DISCONTINUED | OUTPATIENT
Start: 2019-11-05 | End: 2019-11-05

## 2019-11-05 RX ORDER — LIDOCAINE HCL/PF 100 MG/5ML
SYRINGE (ML) INTRAVENOUS
Status: DISCONTINUED | OUTPATIENT
Start: 2019-11-05 | End: 2019-11-05

## 2019-11-05 RX ORDER — ACETAMINOPHEN 325 MG/1
650 TABLET ORAL EVERY 8 HOURS PRN
Status: DISCONTINUED | OUTPATIENT
Start: 2019-11-05 | End: 2019-11-05 | Stop reason: HOSPADM

## 2019-11-05 RX ORDER — FENTANYL CITRATE 50 UG/ML
INJECTION, SOLUTION INTRAMUSCULAR; INTRAVENOUS
Status: DISCONTINUED | OUTPATIENT
Start: 2019-11-05 | End: 2019-11-05

## 2019-11-05 RX ORDER — DIPHENHYDRAMINE HYDROCHLORIDE 50 MG/ML
12.5 INJECTION INTRAMUSCULAR; INTRAVENOUS EVERY 30 MIN PRN
Status: DISCONTINUED | OUTPATIENT
Start: 2019-11-05 | End: 2019-11-05 | Stop reason: HOSPADM

## 2019-11-05 RX ORDER — ACETAMINOPHEN 325 MG/1
650 TABLET ORAL EVERY 6 HOURS PRN
Status: DISCONTINUED | OUTPATIENT
Start: 2019-11-05 | End: 2019-11-05 | Stop reason: HOSPADM

## 2019-11-05 RX ORDER — ONDANSETRON 2 MG/ML
4 INJECTION INTRAMUSCULAR; INTRAVENOUS DAILY PRN
Status: DISCONTINUED | OUTPATIENT
Start: 2019-11-05 | End: 2019-11-05 | Stop reason: HOSPADM

## 2019-11-05 RX ORDER — DEXAMETHASONE SODIUM PHOSPHATE 4 MG/ML
INJECTION, SOLUTION INTRA-ARTICULAR; INTRALESIONAL; INTRAMUSCULAR; INTRAVENOUS; SOFT TISSUE
Status: DISCONTINUED | OUTPATIENT
Start: 2019-11-05 | End: 2019-11-05

## 2019-11-05 RX ORDER — OXYCODONE AND ACETAMINOPHEN 7.5; 325 MG/1; MG/1
1 TABLET ORAL EVERY 4 HOURS PRN
Qty: 20 TABLET | Refills: 0 | Status: SHIPPED | OUTPATIENT
Start: 2019-11-05 | End: 2019-12-02

## 2019-11-05 RX ORDER — OXYCODONE HYDROCHLORIDE 5 MG/1
5 TABLET ORAL
Status: DISCONTINUED | OUTPATIENT
Start: 2019-11-05 | End: 2019-11-05 | Stop reason: HOSPADM

## 2019-11-05 RX ORDER — HYDROCODONE BITARTRATE AND ACETAMINOPHEN 5; 325 MG/1; MG/1
1 TABLET ORAL EVERY 4 HOURS PRN
Status: DISCONTINUED | OUTPATIENT
Start: 2019-11-05 | End: 2019-11-05 | Stop reason: HOSPADM

## 2019-11-05 RX ORDER — HYDROCODONE BITARTRATE AND ACETAMINOPHEN 10; 325 MG/1; MG/1
1 TABLET ORAL EVERY 4 HOURS PRN
Status: DISCONTINUED | OUTPATIENT
Start: 2019-11-05 | End: 2019-11-05 | Stop reason: HOSPADM

## 2019-11-05 RX ORDER — ONDANSETRON 2 MG/ML
4 INJECTION INTRAMUSCULAR; INTRAVENOUS EVERY 8 HOURS PRN
Status: DISCONTINUED | OUTPATIENT
Start: 2019-11-05 | End: 2019-11-05 | Stop reason: HOSPADM

## 2019-11-05 RX ADMIN — LIDOCAINE HYDROCHLORIDE 40 MG: 20 INJECTION, SOLUTION INTRAVENOUS at 08:11

## 2019-11-05 RX ADMIN — HYDROCODONE BITARTRATE AND ACETAMINOPHEN 1 TABLET: 5; 325 TABLET ORAL at 02:11

## 2019-11-05 RX ADMIN — SODIUM CHLORIDE: 0.9 INJECTION, SOLUTION INTRAVENOUS at 07:11

## 2019-11-05 RX ADMIN — DIPHENHYDRAMINE HYDROCHLORIDE 12.5 MG: 50 INJECTION, SOLUTION INTRAMUSCULAR; INTRAVENOUS at 09:11

## 2019-11-05 RX ADMIN — SODIUM CHLORIDE: 0.9 INJECTION, SOLUTION INTRAVENOUS at 10:11

## 2019-11-05 RX ADMIN — PIPERACILLIN AND TAZOBACTAM 4.5 G: 4; .5 INJECTION, POWDER, LYOPHILIZED, FOR SOLUTION INTRAVENOUS; PARENTERAL at 09:11

## 2019-11-05 RX ADMIN — FENTANYL CITRATE 50 MCG: 50 INJECTION, SOLUTION INTRAMUSCULAR; INTRAVENOUS at 09:11

## 2019-11-05 RX ADMIN — Medication 10 MG: at 09:11

## 2019-11-05 RX ADMIN — DEXAMETHASONE SODIUM PHOSPHATE 8 MG: 4 INJECTION, SOLUTION INTRAMUSCULAR; INTRAVENOUS at 08:11

## 2019-11-05 RX ADMIN — SODIUM CHLORIDE, SODIUM LACTATE, POTASSIUM CHLORIDE, AND CALCIUM CHLORIDE: 600; 310; 30; 20 INJECTION, SOLUTION INTRAVENOUS at 08:11

## 2019-11-05 RX ADMIN — ONDANSETRON 4 MG: 2 INJECTION, SOLUTION INTRAMUSCULAR; INTRAVENOUS at 08:11

## 2019-11-05 RX ADMIN — GLYCOPYRROLATE 0.2 MG: 0.2 INJECTION, SOLUTION INTRAMUSCULAR; INTRAVENOUS at 08:11

## 2019-11-05 RX ADMIN — ROCURONIUM BROMIDE 15 MG: 10 INJECTION, SOLUTION INTRAVENOUS at 08:11

## 2019-11-05 RX ADMIN — SUGAMMADEX 200 MG: 100 INJECTION, SOLUTION INTRAVENOUS at 09:11

## 2019-11-05 RX ADMIN — FENTANYL CITRATE 100 MCG: 50 INJECTION, SOLUTION INTRAMUSCULAR; INTRAVENOUS at 08:11

## 2019-11-05 RX ADMIN — ROCURONIUM BROMIDE 5 MG: 10 INJECTION, SOLUTION INTRAVENOUS at 08:11

## 2019-11-05 RX ADMIN — MORPHINE SULFATE 2 MG: 2 INJECTION, SOLUTION INTRAMUSCULAR; INTRAVENOUS at 07:11

## 2019-11-05 RX ADMIN — SUCCINYLCHOLINE CHLORIDE 120 MG: 20 INJECTION, SOLUTION INTRAMUSCULAR; INTRAVENOUS at 08:11

## 2019-11-05 RX ADMIN — PROPOFOL 160 MG: 10 INJECTION, EMULSION INTRAVENOUS at 08:11

## 2019-11-05 RX ADMIN — OXYCODONE HYDROCHLORIDE 5 MG: 5 TABLET ORAL at 09:11

## 2019-11-05 NOTE — ANESTHESIA PREPROCEDURE EVALUATION
11/05/2019  Judie Schmitz is a 39 y.o., female.    Anesthesia Evaluation    I have reviewed the Patient Summary Reports.    I have reviewed the Nursing Notes.   I have reviewed the Medications.     Review of Systems  Anesthesia Hx:  Denies Family Hx of Anesthesia complications.   Denies Personal Hx of Anesthesia complications.   Social:  Non-Smoker    Hematology/Oncology:  Hematology Normal   Oncology Normal     EENT/Dental:EENT/Dental Normal   Cardiovascular:  Cardiovascular Normal     Pulmonary:  Pulmonary Normal    Renal/:  Renal/ Normal     Hepatic/GI:  Hepatic/GI Normal IBS   Musculoskeletal:  Musculoskeletal Normal    Neurological:  Neurology Normal Reynaud's   Endocrine:  Endocrine Normal    Dermatological:  Skin Normal    Psych:   Psychiatric History          Physical Exam  General:  Obesity    Airway/Jaw/Neck:  Airway Findings: Mouth Opening: Normal Mallampati: I  TM Distance: Normal, at least 6 cm      Dental:  Dental Findings: In tact         Mental Status:  Mental Status Findings:  Cooperative, Alert and Oriented         Anesthesia Plan  Type of Anesthesia, risks & benefits discussed:  Anesthesia Type:  general  Patient's Preference:   Intra-op Monitoring Plan: standard ASA monitors  Intra-op Monitoring Plan Comments:   Post Op Pain Control Plan: multimodal analgesia and per primary service following discharge from PACU  Post Op Pain Control Plan Comments:   Induction:   IV  Beta Blocker:         Informed Consent: Patient understands risks and agrees with Anesthesia plan.  Questions answered. Anesthesia consent signed with patient.  ASA Score: 2  emergent   Day of Surgery Review of History & Physical:    H&P update referred to the surgeon.         Ready For Surgery From Anesthesia Perspective.

## 2019-11-05 NOTE — ANESTHESIA POSTPROCEDURE EVALUATION
Anesthesia Post Evaluation    Patient: Judie Schmitz    Procedure(s) Performed: Procedure(s) (LRB):  APPENDECTOMY, LAPAROSCOPIC (N/A)    Final Anesthesia Type: general  Patient location during evaluation: PACU  Patient participation: Yes- Able to Participate  Level of consciousness: awake and alert  Post-procedure vital signs: reviewed and stable  Pain management: adequate  Airway patency: patent  PONV status at discharge: No PONV  Anesthetic complications: no      Cardiovascular status: blood pressure returned to baseline  Respiratory status: unassisted, spontaneous ventilation and room air  Hydration status: euvolemic  Follow-up not needed.          Vitals Value Taken Time   /56 11/5/2019 10:17 AM   Temp 37.3 °C (99.2 °F) 11/5/2019 10:15 AM   Pulse 105 11/5/2019 10:19 AM   Resp 18 11/5/2019 10:15 AM   SpO2 97 % 11/5/2019 10:19 AM   Vitals shown include unvalidated device data.      Event Time     Out of Recovery 10:23:11          Pain/Vickey Score: Pain Rating Prior to Med Admin: 4 (11/5/2019  9:44 AM)  Pain Rating Post Med Admin: 2 (11/5/2019 10:15 AM)  Vickey Score: 10 (11/5/2019 10:15 AM)

## 2019-11-05 NOTE — PLAN OF CARE
Discharge Planning:  Patient admitted on 11-5-19  Current dispo: home today, per tolerating diet  Case management to follow as needed         11/05/19 1144   Final Note   Assessment Type Final Discharge Note   Anticipated Discharge Disposition Home   Hospital Follow Up  Appt(s) scheduled? Yes   Discharge plans and expectations educations in teach back method with documentation complete? Yes   Right Care Referral Info   Post Acute Recommendation No Care   Referral Type see surgery and PCP as scheduled

## 2019-11-05 NOTE — PLAN OF CARE
Discharge Planning:  Patient admitted on 11-5-19  LOS-day 0  Chart reviewed, Care plan discussed with *Mrs Schmitz  Discussed care plan with treatment team,  attending Dr Hendricks  Consults following are: case mgt  PCP updated in [facility]: yes  Pharmacy, updated in [facility]: [facility] [place]  Insurance: aetna  DME at home: n/a  Current dispo: home today, per tolerating diet  Transportation: has reliable  Case management to follow       11/05/19 1140   Discharge Assessment   Assessment Type Discharge Planning Assessment   Confirmed/corrected address and phone number on facesheet? Yes   Assessment information obtained from? Patient;Caregiver;Medical Record   Communicated expected length of stay with patient/caregiver yes   Prior to hospitilization cognitive status: Alert/Oriented   Prior to hospitalization functional status: Independent   Current cognitive status: Alert/Oriented   Current Functional Status: Independent   Lives With child(davidson), dependent   Able to Return to Prior Arrangements yes   Is patient able to care for self after discharge? Yes   Readmission Within the Last 30 Days no previous admission in last 30 days   Patient currently being followed by outpatient case management? No   Patient currently receives any other outside agency services? No   Equipment Currently Used at Home none   Do you have any problems affording any of your prescribed medications? No   Is the patient taking medications as prescribed? yes   Does the patient have transportation home? Yes   Transportation Anticipated family or friend will provide   Discharge Plan A Home   DME Needed Upon Discharge  none   Patient/Family in Agreement with Plan yes

## 2019-11-05 NOTE — ED PROVIDER NOTES
Encounter Date: 2019    SCRIBE #1 NOTE: I, Mary Covarrubias, am scribing for, and in the presence of, Dr. Garg.       History     Chief Complaint   Patient presents with    Abdominal Pain     Pt transfered to ED for appendacitis.      Time seen by provider: 5:07 AM    This is a 39 y.o. female who presents with complaint of right sided abdominal soreness that began eleven hours ago. She reports being seen at another ED for the pain and being diagnosed with appendicitis. The patient reports vomiting and diarrhea at home when her abdominal pain began. She reports pain is now a 5/10 after being given morphine and she denies nausea after being given Zofran when she was first seen.    The history is provided by the patient.     Review of patient's allergies indicates:  No Known Allergies  Past Medical History:   Diagnosis Date    Abnormal Pap smear of cervix age 34    no treatment; repeat PAPs normal    ADHD (attention deficit hyperactivity disorder), inattentive type 2007    diagnosed by Dr. Denny - taking Adderall 10 mg twice daily from 2007 to  - increased Adderall to 20 mg twice daily until  - patient quit school and got off med - started back on Adderall 10 in  but then off med when had baby in  - has not been on med since having baby in .    Anxiety and depression     IBS (irritable bowel syndrome)     Insomnia     Raynaud's disease     followed by Rheumatology Dr. Villavicencio     Past Surgical History:   Procedure Laterality Date     SECTION      x2 2002-10/21/2013     Family History   Problem Relation Age of Onset    Hypertension Mother     COPD Mother 50    Rheum arthritis Mother     Colon polyps Father     Stroke Maternal Grandmother     Pneumonia Maternal Grandmother         passed age 88    Cancer Maternal Grandfather         brain tumor dont know what age he passed    Suicide Paternal Grandfather         passed in his 50's    No Known Problems  Sister     Montoya syndrome Sister     Heart disease Sister     Thyroid disease Sister     Breast cancer Paternal Aunt     Colon cancer Neg Hx     Ovarian cancer Neg Hx      Social History     Tobacco Use    Smoking status: Never Smoker    Smokeless tobacco: Never Used   Substance Use Topics    Alcohol use: Yes     Comment: occasional    Drug use: No     Review of Systems   Constitutional: Negative for fever.   HENT: Negative for sore throat.    Respiratory: Negative for shortness of breath.    Cardiovascular: Negative for chest pain.   Gastrointestinal: Positive for abdominal pain, diarrhea and vomiting.   Genitourinary: Negative for dysuria.   Musculoskeletal: Negative for back pain.   Skin: Negative for rash.   Neurological: Negative for weakness.   Hematological: Does not bruise/bleed easily.   All other systems reviewed and are negative.      Physical Exam     Initial Vitals [11/05/19 0505]   BP Pulse Resp Temp SpO2   -- -- -- 100.3 °F (37.9 °C) --      MAP       --         Physical Exam    Nursing note and vitals reviewed.  Constitutional: She appears well-developed and well-nourished. She is not diaphoretic. No distress.   HENT:   Head: Normocephalic and atraumatic.   Eyes: Conjunctivae and EOM are normal.   Neck: Normal range of motion. Neck supple.   Cardiovascular: Normal rate, regular rhythm and normal heart sounds.   Pulmonary/Chest: Breath sounds normal. No respiratory distress. She has no wheezes. She has no rhonchi. She has no rales.   Abdominal: There is no rebound and no guarding.   Right sided abdominal tenderness to palpation.   Musculoskeletal: Normal range of motion.   Neurological: She is alert and oriented to person, place, and time.   Skin: Skin is warm and dry.         ED Course   Procedures  Labs Reviewed - No data to display       Imaging Results    None          Medical Decision Making:   History:   Old Medical Records: I decided to obtain old medical records.    Additional MDM:    Comments: 39-year-old female transferred from an outside hospital with diagnosis of acute appendicitis.  She was transferred to [facility] for general surgery evaluation.  Patient currently denies any nausea or pain. I did review the lab results of her previous hospital visit.  Unfortunately, the CT report read is not available for review.  The patient did receive Zosyn at the outside hospital.  The case has been discussed with Dr. Hendricks who accepted the patient and I will place his admission orders..          Scribe Attestation:   Scribe #1: I performed the above scribed service and the documentation accurately describes the services I performed. I attest to the accuracy of the note.    Attending Attestation:           Physician Attestation for Scribe:  Physician Attestation Statement for Scribe #1: I, Dr. Garg, reviewed documentation, as scribed by Mary Covarrubias in my presence, and it is both accurate and complete.                    Clinical Impression:   No diagnosis found.                               Nayeli Garg MD  11/05/19 2140

## 2019-11-05 NOTE — BRIEF OP NOTE
[facility]  Brief Operative Note     SUMMARY     Surgery Date: 11/5/2019     Surgeon(s) and Role:     * Erasto Hendricks MD - Primary    Assisting Surgeon: None    Pre-op Diagnosis:  Appendicitis [K37]    Post-op Diagnosis:  Post-Op Diagnosis Codes:     * Appendicitis [K37]    Procedure(s) (LRB):  APPENDECTOMY, LAPAROSCOPIC (N/A)    Anesthesia: General    Description of the findings of the procedure: Acute appy    Findings/Key Components:     Estimated Blood Loss: * No values recorded between 11/5/2019  8:56 AM and 11/5/2019  9:28 AM *min         Specimens:   Specimen (12h ago, onward)     Start     Ordered    11/05/19 0907  Specimen to Pathology - Surgery  Once     Comments:  1. Appendix      11/05/19 0918                Discharge Note    SUMMARY     Admit Date: 11/5/2019    Discharge Date and Time:  11/05/2019 9:29 AM    Hospital Course (synopsis of major diagnoses, care, treatment, and services provided during the course of the hospital stay): treated     Final Diagnosis: Post-Op Diagnosis Codes:     * Appendicitis [K37]    Disposition: Home or Self Care    Follow Up/Patient Instructions:     Medications:  Reconciled Home Medications:      Medication List      START taking these medications    HYDROcodone-acetaminophen 5-325 mg per tablet  Commonly known as:  NORCO  Take 1 or 2 tabs every 4 hours as needed.        CONTINUE taking these medications    aluminum chloride 20 % external solution  Commonly known as:  DRYSOL DAB-O-MATIC  Apply topically every evening.     clonazePAM 0.5 MG disintegrating tablet  Commonly known as:  KLONOPIN  Take 1 tablet (0.5 mg total) by mouth nightly as needed.     dextroamphetamine-amphetamine 20 mg tablet  Commonly known as:  ADDERALL  Take 1 tablet by mouth 2 (two) times daily.     dicyclomine 10 MG capsule  Commonly known as:  BENTYL  Take 10 mg by mouth 4 (four) times daily as needed (IBS symptoms).     etonogestrel-ethinyl estradiol 0.12-0.015 mg/24 hr  vaginal ring  Commonly known as:  NUVARING  PLACE 1 RING VAGINALLY EVERY 28 DAYS     zolpidem 5 MG Tab  Commonly known as:  AMBIEN  Ambien 1 pill PO QHS PRN insomnia          Discharge Procedure Orders   Diet general     Call MD for:  redness, tenderness, or signs of infection (pain, swelling, redness, odor or green/yellow discharge around incision site)     Remove dressing in 48 hours     Shower on day dressing removed (No bath)   Order Comments: You may shower on the 2nd day following your surgery.     Activity as tolerated     Follow-up Information     Erasto Hendricks MD In 2 weeks.    Specialty:  General Surgery  Contact information:  [address]  [facility] [id number] [phone]

## 2019-11-05 NOTE — ED TRIAGE NOTES
Pt reports to ED transferred from [facility] ED with c/o constant, stabbing RLQ ABD pain since 6pm yesterday. Pt reports N,V, D. Pt was given acetaminophen and morphina prior to arrival.

## 2019-11-05 NOTE — NURSING
Discharge instructions reviewed with patient; patient verbalize understanding. IV to the left forearm removed no swelling or redness noted to site. Awaiting transport to arrive.

## 2019-11-05 NOTE — TRANSFER OF CARE
Anesthesia Transfer of Care Note    Patient: Judie Schmitz    Procedure(s) Performed: Procedure(s) (LRB):  APPENDECTOMY, LAPAROSCOPIC (N/A)    Patient location: PACU    Anesthesia Type: general    Transport from OR: Transported from OR on 2-3 L/min O2 by NC with adequate spontaneous ventilation    Post pain: adequate analgesia    Post assessment: no apparent anesthetic complications and tolerated procedure well    Post vital signs: stable    Level of consciousness: awake and alert    Nausea/Vomiting: no nausea/vomiting    Complications: none    Transfer of care protocol was followed      Last vitals:   Visit Vitals  /74 (BP Location: Right arm, Patient Position: Lying)   Pulse (!) 114   Temp 36.9 °C (98.4 °F) (Oral)   Resp 20   Wt 83 kg (182 lb 15.7 oz)   SpO2 100%   BMI 31.41 kg/m²

## 2019-11-05 NOTE — H&P
[facility]  History & Physical    History of Present Illness:  This 39-year-old white female presented in the emergency room with complaints right-sided abdominal pain. CT scan revealed uncomplicated appendicitis.  She was transferred here for treatment.        Review of patient's allergies indicates:   Allergen Reactions    Tetanus vaccines and toxoid        Past Medical History:   Diagnosis Date    Abnormal Pap smear of cervix age 34    no treatment; repeat PAPs normal    ADHD (attention deficit hyperactivity disorder), inattentive type 2007    diagnosed by Dr. Denny - taking Adderall 10 mg twice daily from 2007 to  - increased Adderall to 20 mg twice daily until  - patient quit school and got off med - started back on Adderall 10 in  but then off med when had baby in  - has not been on med since having baby in .    Anxiety and depression     IBS (irritable bowel syndrome)     Insomnia     Raynaud's disease     followed by Rheumatology Dr. Villavicencio     Past Surgical History:   Procedure Laterality Date     SECTION      x2 2002-10/21/2013     Family History   Problem Relation Age of Onset    Hypertension Mother     COPD Mother 50    Rheum arthritis Mother     Colon polyps Father     Stroke Maternal Grandmother     Pneumonia Maternal Grandmother         passed age 88    Cancer Maternal Grandfather         brain tumor dont know what age he passed    Suicide Paternal Grandfather         passed in his 50's    No Known Problems Sister     Montoya syndrome Sister     Heart disease Sister     Thyroid disease Sister     Breast cancer Paternal Aunt     Colon cancer Neg Hx     Ovarian cancer Neg Hx      Social History     Tobacco Use    Smoking status: Never Smoker    Smokeless tobacco: Never Used   Substance Use Topics    Alcohol use: Yes     Comment: occasional    Drug use: No        Review of Systems:  Negative    Physical  Exam:  In no acute distress  Chest clear  Heart rate and rhythm regular  Abdomen mildly distended with tenderness and rebound in the right lower quadrant.  Extremities no edema    Impression:  Acute appendicitis    Plan:  Laparoscopic appendectomy

## 2019-11-05 NOTE — ANESTHESIA PROCEDURE NOTES
Intubation  Performed by: Evens Foreman Jr., CRNA  Authorized by: Alex Montgomery MD     Intubation:     Induction:  Intravenous    Intubated:  Postinduction    Mask Ventilation:  N/a    Attempts:  1    Attempted By:  CRNA    Method of Intubation:  Video laryngoscopy    Blade:  Mary 3    Laryngeal View Grade: Grade I - full view of chords      Difficult Airway Encountered?: No      Complications:  None    Airway Device:  Oral endotracheal tube    Airway Device Size:  7.0    Style/Cuff Inflation:  Cuffed    Tube secured:  21    Secured at:  The lips    Placement Verified By:  Capnometry and Colorimetric ETCO2 device    Complicating Factors:  None    Findings Post-Intubation:  BS equal bilateral

## 2019-11-05 NOTE — OP NOTE
[facility]  Surgery Department  Operative Note    SUMMARY     Patient: Judie Schmitz    Medical Record: [id number]    Date of Procedure: 11/5/2019     Surgeon: Surgeon(s) and Role:     * Erasto Hendricks MD - Primary    Assisting Surgeon: None    Pre-Operative Diagnosis: Appendicitis [K37]    Post-Operative Diagnosis: Post-Op Diagnosis Codes:     * Appendicitis [K37]    Procedure: Procedure(s) (LRB):  APPENDECTOMY, LAPAROSCOPIC (N/A)    Procedure in Detail:  The patient was taken to the operating room placed on the table in the supine position.  After smooth induction with general anesthesia the abdomen was prepped and draped in the usual sterile.  A sub umbilical incision was made and Veress needle placed.  The abdomen was easily insufflated with CO2 to 12 mm of mercury.  5 mm Optiview was advanced and the scope placed.  Inspection revealed no injury to the underlying bowel. 5 mm cannula was placed in the right abdomen and a 12 mm Opti View in the left lower quadrant. Manipulation in the right lower quadrant revealed an acute appendicitis without evidence of abscess or perforation.  The base of the appendix was cleared.  A window was made through the mesoappendix.  Base of the appendix and the cecum normal in appearance.  The the appendix was transected using the Endo-CHARMAINE stapler. The mesoappendix was then taken down using the Harmonic scalpel. The appendix was placed in an endobag and removed through the 12 mm port site. The area was inspected.  There was no evidence of bleeding.  The area was irrigated and the fluid aspirated.  The 12 mm cannula was then removed. The fascia was reapproximated using 0 Vicryl. The 5 mm cannulas were then removed.  The CO2 was allowed to escape.  The wounds were closed using 4 O Vicryl followed by glue and Steri-Strips.  Blood loss was minimal.  Patient tolerated the procedure and left the operating room in stable condition.

## 2019-11-21 DIAGNOSIS — G47.00 INSOMNIA, UNSPECIFIED TYPE: ICD-10-CM

## 2019-11-21 RX ORDER — HYDROXYZINE PAMOATE 50 MG/1
CAPSULE ORAL
Qty: 90 CAPSULE | Refills: 4 | OUTPATIENT
Start: 2019-11-21

## 2019-12-02 ENCOUNTER — PATIENT OUTREACH (OUTPATIENT)
Dept: ADMINISTRATIVE | Facility: OTHER | Age: 39
End: 2019-12-02

## 2019-12-02 ENCOUNTER — OFFICE VISIT (OUTPATIENT)
Dept: PODIATRY | Facility: CLINIC | Age: 39
End: 2019-12-02
Payer: COMMERCIAL

## 2019-12-02 VITALS
WEIGHT: 178.63 LBS | HEART RATE: 74 BPM | DIASTOLIC BLOOD PRESSURE: 78 MMHG | SYSTOLIC BLOOD PRESSURE: 123 MMHG | HEIGHT: 64 IN | BODY MASS INDEX: 30.5 KG/M2

## 2019-12-02 DIAGNOSIS — S92.524A CLOSED NONDISPLACED FRACTURE OF MIDDLE PHALANX OF LESSER TOE OF RIGHT FOOT, INITIAL ENCOUNTER: Primary | ICD-10-CM

## 2019-12-02 DIAGNOSIS — R58 ECCHYMOSIS: ICD-10-CM

## 2019-12-02 PROCEDURE — 99203 PR OFFICE/OUTPT VISIT, NEW, LEVL III, 30-44 MIN: ICD-10-PCS | Mod: S$GLB,,, | Performed by: PODIATRIST

## 2019-12-02 PROCEDURE — 99999 PR PBB SHADOW E&M-EST. PATIENT-LVL III: CPT | Mod: PBBFAC,,, | Performed by: PODIATRIST

## 2019-12-02 PROCEDURE — 99999 PR PBB SHADOW E&M-EST. PATIENT-LVL III: ICD-10-PCS | Mod: PBBFAC,,, | Performed by: PODIATRIST

## 2019-12-02 PROCEDURE — 99203 OFFICE O/P NEW LOW 30 MIN: CPT | Mod: S$GLB,,, | Performed by: PODIATRIST

## 2019-12-02 RX ORDER — OXYCODONE AND ACETAMINOPHEN 5; 325 MG/1; MG/1
1 TABLET ORAL EVERY 6 HOURS PRN
Qty: 20 TABLET | Refills: 0 | Status: SHIPPED | OUTPATIENT
Start: 2019-12-02 | End: 2020-01-29

## 2019-12-02 RX ORDER — NAPROXEN 500 MG/1
1 TABLET ORAL 2 TIMES DAILY
COMMUNITY
Start: 2019-11-30 | End: 2020-01-29

## 2019-12-02 NOTE — PROGRESS NOTES
Subjective:      Patient ID: Judie Schmitz is a 39 y.o. female.    Chief Complaint: Foot Injury (pt c/o breaking toe on right foot)    39 y.o. female presenting with right foot pain. Points dorsal 2nd and hallux for pain.  Describes pain as throbbing and aching.  Injury happened last Friday. Got stubbed.  Went to urgent care x-rays were taken. X-ray revealed acute fracture of the right 2nd toe.  Has been deion taping with hallux.  Has been ambulating in surgical shoe.  Pain is slightly getting better but still painful. Has been elevating and icing.       Review of Systems   Constitution: Negative for chills, decreased appetite, fever and malaise/fatigue.   HENT: Negative for congestion, ear discharge and sore throat.    Eyes: Negative for discharge and pain.   Cardiovascular: Negative for chest pain, claudication and leg swelling.   Respiratory: Negative for cough and shortness of breath.    Skin: Positive for color change. Negative for nail changes and rash.        Ecchymosis right foot   Musculoskeletal: Positive for joint pain and joint swelling. Negative for arthritis and muscle weakness.        Right 2nd toe pain   Gastrointestinal: Negative for bloating, abdominal pain, diarrhea, nausea and vomiting.   Genitourinary: Negative for flank pain and hematuria.   Neurological: Negative for headaches, numbness and weakness.   Psychiatric/Behavioral: Negative for altered mental status.             Past Medical History:   Diagnosis Date    Abnormal Pap smear of cervix age 34    no treatment; repeat PAPs normal    ADHD (attention deficit hyperactivity disorder), inattentive type 09/13/2007    diagnosed by Dr. Denny - taking Adderall 10 mg twice daily from 9/2007 to 2009 - increased Adderall to 20 mg twice daily until 2011 - patient quit school and got off med - started back on Adderall 10 in 2012 but then off med when had baby in 2013 - has not been on med since having baby in 2013.    Anxiety and  depression     IBS (irritable bowel syndrome)     Insomnia     Raynaud's disease     followed by Rheumatology Dr. Villavicencio       Past Surgical History:   Procedure Laterality Date     SECTION      x2 2002-10/21/2013    LAPAROSCOPIC APPENDECTOMY N/A 2019    Procedure: APPENDECTOMY, LAPAROSCOPIC (ADD ON );  Surgeon: Erasto Hendricks MD;  [facility];  Service: General;  Laterality: N/A;  (ADD ON )       Family History   Problem Relation Age of Onset    Hypertension Mother     COPD Mother 50    Rheum arthritis Mother     Colon polyps Father     Stroke Maternal Grandmother     Pneumonia Maternal Grandmother         passed age 88    Cancer Maternal Grandfather         brain tumor dont know what age he passed    Suicide Paternal Grandfather         passed in his 50's    No Known Problems Sister     Montoya syndrome Sister     Heart disease Sister     Thyroid disease Sister     Breast cancer Paternal Aunt     Colon cancer Neg Hx     Ovarian cancer Neg Hx        Social History     Socioeconomic History    Marital status:      Spouse name: Not on file    Number of children: 2    Years of education: Not on file    Highest education level: Not on file   Occupational History    Occupation: Medical Assistant   Social Needs    Financial resource strain: Not on file    Food insecurity:     Worry: Not on file     Inability: Not on file    Transportation needs:     Medical: Not on file     Non-medical: Not on file   Tobacco Use    Smoking status: Never Smoker    Smokeless tobacco: Never Used   Substance and Sexual Activity    Alcohol use: Yes     Comment: occasional    Drug use: No    Sexual activity: Yes     Partners: Male     Birth control/protection: Inserts     Comment:    Lifestyle    Physical activity:     Days per week: Not on file     Minutes per session: Not on file    Stress: Not on file   Relationships    Social connections:     Talks on  "phone: Not on file     Gets together: Not on file     Attends Latter day service: Not on file     Active member of club or organization: Not on file     Attends meetings of clubs or organizations: Not on file     Relationship status: Not on file   Other Topics Concern    Not on file   Social History Narrative    Lives in [place].        Current Outpatient Medications   Medication Sig Dispense Refill    clonazePAM (KLONOPIN) 0.5 MG disintegrating tablet Take 1 tablet (0.5 mg total) by mouth nightly as needed. 30 tablet 0    dextroamphetamine-amphetamine (ADDERALL) 20 mg tablet Take 1 tablet by mouth 2 (two) times daily. 60 tablet 0    dicyclomine (BENTYL) 10 MG capsule Take 10 mg by mouth 4 (four) times daily as needed (IBS symptoms).      etonogestrel-ethinyl estradiol (NUVARING) 0.12-0.015 mg/24 hr vaginal ring PLACE 1 RING VAGINALLY EVERY 28 DAYS 1 each 11    naproxen (NAPROSYN) 500 MG tablet Take 1 tablet by mouth 2 (two) times daily.      zolpidem (AMBIEN) 5 MG Tab Ambien 1 pill PO QHS PRN insomnia 30 tablet 5    oxyCODONE-acetaminophen (PERCOCET) 5-325 mg per tablet Take 1 tablet by mouth every 6 (six) hours as needed for Pain. 20 tablet 0     No current facility-administered medications for this visit.        Review of patient's allergies indicates:   Allergen Reactions    Tetanus vaccines and toxoid        Vitals:    12/02/19 1003   BP: 123/78   Pulse: 74   Weight: 81 kg (178 lb 9.6 oz)   Height: 5' 4" (1.626 m)   PainSc:   6   PainLoc: Toe       Objective:      Physical Exam   Constitutional: She is oriented to person, place, and time. She appears well-developed and well-nourished. No distress.   HENT:   Nose: Nose normal.   Eyes: Conjunctivae are normal.   Neck: Normal range of motion.   Pulmonary/Chest: Effort normal. She exhibits no tenderness.   Abdominal: There is no tenderness.   Neurological: She is alert and oriented to person, place, and time.   Psychiatric: She has a normal mood and affect. " Her behavior is normal.       Vascular: Distal DP/PT pulses palpable 2/4. CRT < 3 sec to tips of toes. No vericosities noted to LEs. Hair growth present LE, warm to touch LE,   Right foot:  Mild to moderate swelling 2nd toe, hallux, dorsal aspect of the forefoot    Dermatologic: No open lesions, lacerations or wounds. Interdigital spaces clean, dry and intact. No erythema, rubor, calor noted LE  Right foot:  Ecchymosis dorsal aspect of the hallux and 2nd toe over the forefoot area    Musculoskeletal:  No calf tenderness LE, Compartments soft/compressible.   Right foot:  Tender to touch dorsal aspect of the 2nd middle phalanx, diffuse pain over the hallux, and forefoot including MPJ of the 2nd and 1st.  Limited range of motion of the MPJ of the 1st and 2nd and small joints of the toes secondary to pain and swelling.     Neurological: Light touch, proprioception, and sharp/dull sensation are all intact. Protective threshold with the [place]-Wienstein monofilament is intact. Vibratory sensation intact.           Assessment:       Encounter Diagnoses   Name Primary?    Closed nondisplaced fracture of middle phalanx of lesser toe of right foot, initial encounter - Right Foot Yes    Ecchymosis - Right Foot          Plan:       Judie was seen today for foot injury.    Diagnoses and all orders for this visit:    Closed nondisplaced fracture of middle phalanx of lesser toe of right foot, initial encounter - Right Foot    Ecchymosis - Right Foot    Other orders  -     oxyCODONE-acetaminophen (PERCOCET) 5-325 mg per tablet; Take 1 tablet by mouth every 6 (six) hours as needed for Pain.      I counseled the patient on her conditions, their implications and medical management.    39 y.o. female with right 2nd toe middle phalanx fracture nondisplaced.     -x-ray (she brought a CD) reviewed with the patient. + minimally displaced fracture middle phalanx of the 2nd toe.  Recommend buddy taping with the hallux to help adducting  the 2nd toe.  Hallux of the 2nd toe got deion tape.  Ambulating in surgical shoe 3-4 weeks.  -Rx percocet  -The nature of the condition, options for management, as well as potential risks and complications were discussed in detail with patient. Patient was amenable to my recommendations and left my office fully informed and will follow up as instructed or sooner if necessary.    -Patient was advised of signs and symptoms of infection including redness, drainage, purulence, odor, streaking, fever, chills and I advised patient to seek medical attention (ER or urgent care) if these symptoms arise.   -f/u 4 weeks     Note dictated with voice recognition software, please excuse any grammatical errors.

## 2019-12-08 ENCOUNTER — PATIENT OUTREACH (OUTPATIENT)
Dept: ADMINISTRATIVE | Facility: OTHER | Age: 39
End: 2019-12-08

## 2020-01-29 ENCOUNTER — OFFICE VISIT (OUTPATIENT)
Dept: FAMILY MEDICINE | Facility: CLINIC | Age: 40
End: 2020-01-29
Payer: COMMERCIAL

## 2020-01-29 VITALS
OXYGEN SATURATION: 99 % | HEART RATE: 110 BPM | DIASTOLIC BLOOD PRESSURE: 82 MMHG | BODY MASS INDEX: 30.38 KG/M2 | TEMPERATURE: 98 F | WEIGHT: 177.94 LBS | SYSTOLIC BLOOD PRESSURE: 136 MMHG | HEIGHT: 64 IN | RESPIRATION RATE: 18 BRPM

## 2020-01-29 DIAGNOSIS — G89.29 CHRONIC MIDLINE LOW BACK PAIN WITHOUT SCIATICA: ICD-10-CM

## 2020-01-29 DIAGNOSIS — I73.00 RAYNAUD'S DISEASE WITHOUT GANGRENE: ICD-10-CM

## 2020-01-29 DIAGNOSIS — M54.50 CHRONIC MIDLINE LOW BACK PAIN WITHOUT SCIATICA: ICD-10-CM

## 2020-01-29 DIAGNOSIS — F90.0 ADHD (ATTENTION DEFICIT HYPERACTIVITY DISORDER), INATTENTIVE TYPE: Primary | ICD-10-CM

## 2020-01-29 DIAGNOSIS — G47.00 INSOMNIA, UNSPECIFIED TYPE: ICD-10-CM

## 2020-01-29 PROBLEM — K35.80 ACUTE APPENDICITIS WITHOUT PERITONITIS: Status: RESOLVED | Noted: 2019-11-05 | Resolved: 2020-01-29

## 2020-01-29 PROCEDURE — 99213 OFFICE O/P EST LOW 20 MIN: CPT | Mod: S$GLB,,, | Performed by: NURSE PRACTITIONER

## 2020-01-29 PROCEDURE — 99213 PR OFFICE/OUTPT VISIT, EST, LEVL III, 20-29 MIN: ICD-10-PCS | Mod: S$GLB,,, | Performed by: NURSE PRACTITIONER

## 2020-01-29 PROCEDURE — 99999 PR PBB SHADOW E&M-EST. PATIENT-LVL IV: CPT | Mod: PBBFAC,,, | Performed by: NURSE PRACTITIONER

## 2020-01-29 PROCEDURE — 99999 PR PBB SHADOW E&M-EST. PATIENT-LVL IV: ICD-10-PCS | Mod: PBBFAC,,, | Performed by: NURSE PRACTITIONER

## 2020-01-29 RX ORDER — DEXTROAMPHETAMINE SACCHARATE, AMPHETAMINE ASPARTATE, DEXTROAMPHETAMINE SULFATE AND AMPHETAMINE SULFATE 5; 5; 5; 5 MG/1; MG/1; MG/1; MG/1
1 TABLET ORAL 2 TIMES DAILY
Qty: 60 TABLET | Refills: 0 | Status: SHIPPED | OUTPATIENT
Start: 2020-01-29 | End: 2020-03-25 | Stop reason: SDUPTHER

## 2020-01-29 RX ORDER — MELOXICAM 7.5 MG/1
TABLET ORAL
Qty: 30 TABLET | Refills: 5 | Status: SHIPPED | OUTPATIENT
Start: 2020-01-29 | End: 2020-04-17

## 2020-01-29 NOTE — PROGRESS NOTES
Subjective:       Patient ID: Judie Schmitz is a 39 y.o. female.    Chief Complaint: ADHD (F/U)      Patient is a 39-year-old white female with ADHD, chronic intermittent low back pain, excessive sweating, IBS, Raynaud Disease followed by [facility] and insomnia that is here today for 3 month follow-up.     Patient has ADHD and has been taking Adderall 20 mg twice daily since March 2018.  She reports the dose is working well without side effects.  Patient works as a  of [employer]. She reports that the Generic Adderall filled by [facility] is a white pill that causes increased headaches - she would like the pink/peach pill of that dose - advised patient to speak with pharmacist but I am fairly certain that [facility] uses the  of generic Adderall that is the light peach tablet.     Patient has Raynaud Disease being followed by [facility].    Patient has Insomnia followed by [facility].    Patient has chronic intermittent back pain.  States she took a MOBIC from her uncle and worked well - asking if that is something she can take.      Current Outpatient Medications   Medication Sig Dispense Refill    clonazePAM (KLONOPIN) 0.5 MG disintegrating tablet Take 1 tablet (0.5 mg total) by mouth nightly as needed. 30 tablet 0    dextroamphetamine-amphetamine (ADDERALL) 20 mg tablet Take 1 tablet by mouth 2 (two) times daily. 60 tablet 0    dicyclomine (BENTYL) 10 MG capsule Take 10 mg by mouth 4 (four) times daily as needed (IBS symptoms).      etonogestrel-ethinyl estradiol (NUVARING) 0.12-0.015 mg/24 hr vaginal ring PLACE 1 RING VAGINALLY EVERY 28 DAYS 1 each 11    zolpidem (AMBIEN) 5 MG Tab Ambien 1 pill PO QHS PRN insomnia 30 tablet 5    naproxen (NAPROSYN) 500 MG tablet Take 1 tablet by mouth 2 (two) times daily.      oxyCODONE-acetaminophen (PERCOCET) 5-325 mg per tablet Take 1 tablet by mouth every 6 (six) hours as needed for Pain. 20  tablet 0     No current facility-administered medications for this visit.        Past Medical History:   Diagnosis Date    Abnormal Pap smear of cervix age 34    no treatment; repeat PAPs normal    ADHD (attention deficit hyperactivity disorder), inattentive type 2007    diagnosed by Dr. Denny - taking Adderall 10 mg twice daily from 2007 to  - increased Adderall to 20 mg twice daily until  - patient quit school and got off med - started back on Adderall 10 in  but then off med when had baby in  - has not been on med since having baby in .    Anxiety and depression     IBS (irritable bowel syndrome)     Insomnia     Raynaud's disease     followed by Rheumatology Dr. Villavicencio       Past Surgical History:   Procedure Laterality Date    APPENDECTOMY       SECTION      x2 2002-10/21/2013    LAPAROSCOPIC APPENDECTOMY N/A 2019    Procedure: APPENDECTOMY, LAPAROSCOPIC (ADD ON );  Surgeon: Erasto Hendricks MD;  [facility];  Service: General;  Laterality: N/A;  (ADD ON )       Family History   Problem Relation Age of Onset    Hypertension Mother     COPD Mother 50    Rheum arthritis Mother     Colon polyps Father     Stroke Maternal Grandmother     Pneumonia Maternal Grandmother         passed age 88    Cancer Maternal Grandfather         brain tumor dont know what age he passed    Suicide Paternal Grandfather         passed in his 50's    No Known Problems Sister     Montoya syndrome Sister     Heart disease Sister     Thyroid disease Sister     Breast cancer Paternal Aunt     Colon cancer Neg Hx     Ovarian cancer Neg Hx        Social History     Socioeconomic History    Marital status:      Spouse name: Not on file    Number of children: 2    Years of education: Not on file    Highest education level: Not on file   Occupational History    Occupation:    Social Needs    Financial resource strain: Not on file     Food insecurity:     Worry: Not on file     Inability: Not on file    Transportation needs:     Medical: Not on file     Non-medical: Not on file   Tobacco Use    Smoking status: Never Smoker    Smokeless tobacco: Never Used   Substance and Sexual Activity    Alcohol use: Yes     Comment: occasional    Drug use: No    Sexual activity: Yes     Partners: Male     Birth control/protection: Inserts     Comment:    Lifestyle    Physical activity:     Days per week: Not on file     Minutes per session: Not on file    Stress: Not on file   Relationships    Social connections:     Talks on phone: Not on file     Gets together: Not on file     Attends Alevism service: Not on file     Active member of club or organization: Not on file     Attends meetings of clubs or organizations: Not on file     Relationship status: Not on file   Other Topics Concern    Not on file   Social History Narrative    Lives in [place].        Review of Systems   Constitutional: Negative for appetite change, chills, fatigue, fever and unexpected weight change.   HENT: Negative for congestion, ear pain, mouth sores, nosebleeds, postnasal drip, rhinorrhea, sinus pressure, sneezing, sore throat, trouble swallowing and voice change.    Eyes: Negative for photophobia, pain, discharge, redness, itching and visual disturbance.   Respiratory: Negative for cough, chest tightness and shortness of breath.    Cardiovascular: Negative for chest pain, palpitations and leg swelling.   Gastrointestinal: Negative for abdominal pain, blood in stool, constipation, diarrhea, nausea and vomiting.   Genitourinary: Negative for dysuria, frequency, hematuria and urgency.   Musculoskeletal: Positive for back pain and myalgias. Negative for arthralgias and joint swelling.        Chronic back pains   Skin: Negative for color change and rash.   Allergic/Immunologic: Negative for immunocompromised state.   Neurological: Negative for dizziness, seizures,  "syncope, weakness and headaches.   Hematological: Negative for adenopathy. Does not bruise/bleed easily.   Psychiatric/Behavioral: Negative for agitation, dysphoric mood, sleep disturbance and suicidal ideas. The patient is not nervous/anxious.          Objective:     Vitals:    01/29/20 1618   BP: 136/82   BP Location: Right arm   Patient Position: Sitting   BP Method: Large (Manual)   Pulse: 110   Resp: 18   Temp: 98.4 °F (36.9 °C)   TempSrc: Oral   SpO2: 99%   Weight: 80.7 kg (177 lb 14.6 oz)   Height: 5' 4" (1.626 m)          Physical Exam   Constitutional: She is oriented to person, place, and time. She appears well-developed and well-nourished.   HENT:   Head: Normocephalic and atraumatic.   Right Ear: External ear normal.   Left Ear: External ear normal.   Nose: Nose normal.   Mouth/Throat: Oropharynx is clear and moist. No oropharyngeal exudate.   Eyes: Pupils are equal, round, and reactive to light. EOM are normal.   Neck: Normal range of motion. Neck supple. No tracheal deviation present. No thyromegaly present.   Cardiovascular: Normal rate, regular rhythm and normal heart sounds.   No murmur heard.  Pulmonary/Chest: Effort normal and breath sounds normal. No respiratory distress.   Abdominal: Soft. She exhibits no distension.   Musculoskeletal: Normal range of motion. She exhibits no edema.   Lymphadenopathy:     She has no cervical adenopathy.   Neurological: She is alert and oriented to person, place, and time. No cranial nerve deficit. Coordination normal.   Skin: Skin is warm and dry. No rash noted.   Psychiatric: She has a normal mood and affect.         Assessment:         ICD-10-CM ICD-9-CM   1. ADHD (attention deficit hyperactivity disorder), inattentive type F90.0 314.00   2. Raynaud's disease without gangrene I73.00 443.0   3. Chronic midline low back pain without sciatica M54.5 724.2    G89.29 338.29   4. Insomnia, unspecified type G47.00 780.52       Plan:       ADHD (attention deficit " hyperactivity disorder), inattentive type  -  Controlled on present medication and dose.  Will send refill request when needed.  Recheck in 3 months.  Must keep 3 month appts.  -     dextroamphetamine-amphetamine (ADDERALL) 20 mg tablet; Take 1 tablet by mouth 2 (two) times daily.  Dispense: 60 tablet; Refill: 0    Raynaud's disease without gangrene  -  Followed by rheumatology    Chronic midline low back pain without sciatica  -  Take Meloxicam only as needed at the lowest dose needed  -     meloxicam (MOBIC) 7.5 MG tablet; Take 1 to 2 tablets by mouth daily as needed for pain.  Dispense: 30 tablet; Refill: 5    Insomnia, unspecified type  -  Treated by sleep clinic      Follow up in about 3 months (around 4/29/2020) for med check.     Patient's Medications   New Prescriptions    MELOXICAM (MOBIC) 7.5 MG TABLET    Take 1 to 2 tablets by mouth daily as needed for pain.   Previous Medications    CLONAZEPAM (KLONOPIN) 0.5 MG DISINTEGRATING TABLET    Take 1 tablet (0.5 mg total) by mouth nightly as needed.    DICYCLOMINE (BENTYL) 10 MG CAPSULE    Take 10 mg by mouth 4 (four) times daily as needed (IBS symptoms).    ETONOGESTREL-ETHINYL ESTRADIOL (NUVARING) 0.12-0.015 MG/24 HR VAGINAL RING    PLACE 1 RING VAGINALLY EVERY 28 DAYS    ZOLPIDEM (AMBIEN) 5 MG TAB    Ambien 1 pill PO QHS PRN insomnia   Modified Medications    Modified Medication Previous Medication    DEXTROAMPHETAMINE-AMPHETAMINE (ADDERALL) 20 MG TABLET dextroamphetamine-amphetamine (ADDERALL) 20 mg tablet       Take 1 tablet by mouth 2 (two) times daily.    Take 1 tablet by mouth 2 (two) times daily.   Discontinued Medications    NAPROXEN (NAPROSYN) 500 MG TABLET    Take 1 tablet by mouth 2 (two) times daily.    OXYCODONE-ACETAMINOPHEN (PERCOCET) 5-325 MG PER TABLET    Take 1 tablet by mouth every 6 (six) hours as needed for Pain.

## 2020-03-25 DIAGNOSIS — F90.0 ADHD (ATTENTION DEFICIT HYPERACTIVITY DISORDER), INATTENTIVE TYPE: ICD-10-CM

## 2020-03-25 RX ORDER — DEXTROAMPHETAMINE SACCHARATE, AMPHETAMINE ASPARTATE, DEXTROAMPHETAMINE SULFATE AND AMPHETAMINE SULFATE 5; 5; 5; 5 MG/1; MG/1; MG/1; MG/1
1 TABLET ORAL 2 TIMES DAILY
Qty: 60 TABLET | Refills: 0 | Status: SHIPPED | OUTPATIENT
Start: 2020-03-25 | End: 2020-05-04 | Stop reason: SDUPTHER

## 2020-04-17 ENCOUNTER — OFFICE VISIT (OUTPATIENT)
Dept: PODIATRY | Facility: CLINIC | Age: 40
End: 2020-04-17
Payer: COMMERCIAL

## 2020-04-17 VITALS — SYSTOLIC BLOOD PRESSURE: 120 MMHG | WEIGHT: 177 LBS | DIASTOLIC BLOOD PRESSURE: 80 MMHG | BODY MASS INDEX: 30.38 KG/M2

## 2020-04-17 DIAGNOSIS — M72.2 PLANTAR FASCIITIS: Primary | ICD-10-CM

## 2020-04-17 DIAGNOSIS — M79.672 PAIN OF LEFT HEEL: ICD-10-CM

## 2020-04-17 PROCEDURE — 99999 PR PBB SHADOW E&M-EST. PATIENT-LVL III: ICD-10-PCS | Mod: PBBFAC,,, | Performed by: PODIATRIST

## 2020-04-17 PROCEDURE — 20550 NJX 1 TENDON SHEATH/LIGAMENT: CPT | Mod: LT,S$GLB,, | Performed by: PODIATRIST

## 2020-04-17 PROCEDURE — 99999 PR PBB SHADOW E&M-EST. PATIENT-LVL III: CPT | Mod: PBBFAC,,, | Performed by: PODIATRIST

## 2020-04-17 PROCEDURE — 99213 OFFICE O/P EST LOW 20 MIN: CPT | Mod: 25,S$GLB,, | Performed by: PODIATRIST

## 2020-04-17 PROCEDURE — 99213 PR OFFICE/OUTPT VISIT, EST, LEVL III, 20-29 MIN: ICD-10-PCS | Mod: 25,S$GLB,, | Performed by: PODIATRIST

## 2020-04-17 PROCEDURE — 20550 PR INJECT TENDON SHEATH/LIGAMENT: ICD-10-PCS | Mod: LT,S$GLB,, | Performed by: PODIATRIST

## 2020-04-17 RX ORDER — MELOXICAM 15 MG/1
15 TABLET ORAL DAILY
Qty: 30 TABLET | Refills: 1 | Status: SHIPPED | OUTPATIENT
Start: 2020-04-17 | End: 2020-08-17

## 2020-04-17 NOTE — PROGRESS NOTES
Subjective:      Patient ID: Judie Schmitz is a 39 y.o. female.    Chief Complaint: Heel Pain (Left)    39 y.o. female presenting with right foot pain. Points dorsal 2nd and hallux for pain.  Describes pain as throbbing and aching.  Injury happened last Friday. Got stubbed.  Went to urgent care x-rays were taken. X-ray revealed acute fracture of the right 2nd toe.  Has been deion taping with hallux.  Has been ambulating in surgical shoe.  Pain is slightly getting better but still painful. Has been elevating and icing.     4/17/20 f/u for left heel pain. Right toe pain is slowly improving but still not able to gain whole a lot of joint motion at the level of PIPJ of 2nd toe. Points plantar heel of left foot for pain. Sharp pain during walking. Pain is worse in the morning when she comes out bed. No previous treatment other than stretching and waterbottle exercises which does not help with pain. Ambulating in tennis shoe.     Review of Systems   Constitution: Negative for chills, decreased appetite, fever and malaise/fatigue.   HENT: Negative for congestion, ear discharge and sore throat.    Eyes: Negative for discharge and pain.   Cardiovascular: Negative for chest pain, claudication and leg swelling.   Respiratory: Negative for cough and shortness of breath.    Skin: Negative for color change, nail changes and rash.   Musculoskeletal: Positive for stiffness. Negative for arthritis, joint pain, joint swelling and muscle weakness.        L heel pain    Gastrointestinal: Negative for bloating, abdominal pain, diarrhea, nausea and vomiting.   Genitourinary: Negative for flank pain and hematuria.   Neurological: Negative for headaches, numbness and weakness.   Psychiatric/Behavioral: Negative for altered mental status.             Past Medical History:   Diagnosis Date    Abnormal Pap smear of cervix age 34    no treatment; repeat PAPs normal    ADHD (attention deficit hyperactivity disorder), inattentive  type 2007    diagnosed by Dr. Denny - taking Adderall 10 mg twice daily from 2007 to  - increased Adderall to 20 mg twice daily until  - patient quit school and got off med - started back on Adderall 10 in  but then off med when had baby in  - has not been on med since having baby in .    Anxiety and depression     IBS (irritable bowel syndrome)     Insomnia     Raynaud's disease     followed by Rheumatology Dr. Villavicencio       Past Surgical History:   Procedure Laterality Date    APPENDECTOMY       SECTION      x2 2002-10/21/2013    LAPAROSCOPIC APPENDECTOMY N/A 2019    Procedure: APPENDECTOMY, LAPAROSCOPIC (ADD ON );  Surgeon: Erasto Hendricks MD;  Location: [facility];  Service: General;  Laterality: N/A;  (ADD ON )       Family History   Problem Relation Age of Onset    Hypertension Mother     COPD Mother 50    Rheum arthritis Mother     Colon polyps Father     Stroke Maternal Grandmother     Pneumonia Maternal Grandmother         passed age 88    Cancer Maternal Grandfather         brain tumor dont know what age he passed    Suicide Paternal Grandfather         passed in his 50's    No Known Problems Sister     Montoya syndrome Sister     Heart disease Sister     Thyroid disease Sister     Breast cancer Paternal Aunt     Colon cancer Neg Hx     Ovarian cancer Neg Hx        Social History     Socioeconomic History    Marital status:      Spouse name: Not on file    Number of children: 2    Years of education: Not on file    Highest education level: Not on file   Occupational History    Occupation:    Social Needs    Financial resource strain: Not on file    Food insecurity:     Worry: Not on file     Inability: Not on file    Transportation needs:     Medical: Not on file     Non-medical: Not on file   Tobacco Use    Smoking status: Never Smoker    Smokeless tobacco: Never Used   Substance and Sexual  Activity    Alcohol use: Yes     Comment: occasional    Drug use: No    Sexual activity: Yes     Partners: Male     Birth control/protection: Inserts     Comment:    Lifestyle    Physical activity:     Days per week: Not on file     Minutes per session: Not on file    Stress: Not on file   Relationships    Social connections:     Talks on phone: Not on file     Gets together: Not on file     Attends Scientologist service: Not on file     Active member of club or organization: Not on file     Attends meetings of clubs or organizations: Not on file     Relationship status: Not on file   Other Topics Concern    Not on file   Social History Narrative    Lives in [place].        Current Outpatient Medications   Medication Sig Dispense Refill    clonazePAM (KLONOPIN) 0.5 MG disintegrating tablet Take 1 tablet (0.5 mg total) by mouth nightly as needed. 30 tablet 0    dextroamphetamine-amphetamine (ADDERALL) 20 mg tablet Take 1 tablet by mouth 2 (two) times daily. 60 tablet 0    dicyclomine (BENTYL) 10 MG capsule Take 10 mg by mouth 4 (four) times daily as needed (IBS symptoms).      etonogestrel-ethinyl estradiol (NUVARING) 0.12-0.015 mg/24 hr vaginal ring PLACE 1 RING VAGINALLY EVERY 28 DAYS 1 each 11    zolpidem (AMBIEN) 5 MG Tab Ambien 1 pill PO QHS PRN insomnia 30 tablet 5    meloxicam (MOBIC) 15 MG tablet Take 1 tablet (15 mg total) by mouth once daily. 30 tablet 1     No current facility-administered medications for this visit.        Review of patient's allergies indicates:   Allergen Reactions    Tetanus vaccines and toxoid        Vitals:    04/17/20 1516   BP: 120/80   Weight: 80.3 kg (177 lb)   PainSc:   7       Objective:      Physical Exam   Constitutional: She is oriented to person, place, and time. She appears well-developed and well-nourished. No distress.   HENT:   Nose: Nose normal.   Eyes: Conjunctivae are normal.   Neck: Normal range of motion.   Pulmonary/Chest: Effort normal. She  exhibits no tenderness.   Abdominal: There is no tenderness.   Neurological: She is alert and oriented to person, place, and time.   Psychiatric: She has a normal mood and affect. Her behavior is normal.       Vascular: Distal DP/PT pulses palpable 2/4. CRT < 3 sec to tips of toes. No vericosities noted to LEs. Hair growth present LE, warm to touch LE,     Dermatologic: No open lesions, lacerations or wounds. Interdigital spaces clean, dry and intact. No erythema, rubor, calor noted LE    Musculoskeletal:  No calf tenderness LE, Compartments soft/compressible.   L foot: ttp plantar heel over medial tubercle of the heel. No pain at the heel upon medial and lateral squeezing test. Mild equinus noted.      Neurological: Light touch, proprioception, and sharp/dull sensation are all intact. Protective threshold with the Edwards-Wienstein monofilament is intact. Vibratory sensation intact.           Assessment:       Encounter Diagnoses   Name Primary?    Plantar fasciitis - Left Foot Yes    Pain of left heel          Plan:       Judie was seen today for heel pain.    Diagnoses and all orders for this visit:    Plantar fasciitis - Left Foot    Pain of left heel    Other orders  -     meloxicam (MOBIC) 15 MG tablet; Take 1 tablet (15 mg total) by mouth once daily.      I counseled the patient on her conditions, their implications and medical management.    39 y.o. female with left plantar fasciitis.    -rx. Meloxicam.   -s/p L heel injection. Tolerated well. See procedure note.  -c/w stretching and water bottle exercise. Instructions for both given to patient.  -Discussed different treatment options for heel pain. including conservative and surgical.  I gave written and verbal instructions on heel cord stretching and this was demonstrated for the patient. Patient expressed understanding. Discussed wearing appropriate shoe gear and avoiding flats, slippers, sandals, barefoot, and sockfeet. Recommended arch supports. My  recommendation for OTC supports is Spenco polysorb replacement insoles or patient may elect more aggressive treatment with prescription arch supports.  -The nature of the condition, options for management, as well as potential risks and complications were discussed in detail with patient. Patient was amenable to my recommendations and left my office fully informed and will follow up as instructed or sooner if necessary.    -Patient was advised of signs and symptoms of infection including redness, drainage, purulence, odor, streaking, fever, chills and I advised patient to seek medical attention (ER or urgent care) if these symptoms arise.   -f/u prn      Note dictated with voice recognition software, please excuse any grammatical errors.

## 2020-04-20 RX ORDER — TRIAMCINOLONE ACETONIDE 40 MG/ML
40 INJECTION, SUSPENSION INTRA-ARTICULAR; INTRAMUSCULAR
Status: COMPLETED | OUTPATIENT
Start: 2020-04-20 | End: 2020-04-20

## 2020-04-20 RX ADMIN — TRIAMCINOLONE ACETONIDE 40 MG: 40 INJECTION, SUSPENSION INTRA-ARTICULAR; INTRAMUSCULAR at 01:04

## 2020-04-20 NOTE — PROCEDURES
Procedures     Injection consisted of total of 2cc of kenalog 40 with 0.5% marcaine plain injected into the plantar medial left heel. Skin was prepped with alcohol and ethyl chloride spray. Patient tolerated well with no complications and related relief following injection. Bandaid applied to injection site.     Patient is to use combination of conservative treatment and return for follow up/reassessment at that time as needed. Timeout was performed with pt in the room.

## 2020-05-01 DIAGNOSIS — F90.0 ADHD (ATTENTION DEFICIT HYPERACTIVITY DISORDER), INATTENTIVE TYPE: ICD-10-CM

## 2020-05-01 RX ORDER — DEXTROAMPHETAMINE SACCHARATE, AMPHETAMINE ASPARTATE, DEXTROAMPHETAMINE SULFATE AND AMPHETAMINE SULFATE 5; 5; 5; 5 MG/1; MG/1; MG/1; MG/1
1 TABLET ORAL 2 TIMES DAILY
Qty: 60 TABLET | Refills: 0 | OUTPATIENT
Start: 2020-05-01

## 2020-05-01 NOTE — TELEPHONE ENCOUNTER
Patient is overdue for 3 month ADHD visit - can either schedule appt today or Monday virtual visit or she can come in - either way but must have visit prior to refill.  Needs to be able to check blood pressure and heart rate for visit as well.

## 2020-05-04 ENCOUNTER — OFFICE VISIT (OUTPATIENT)
Dept: FAMILY MEDICINE | Facility: CLINIC | Age: 40
End: 2020-05-04
Payer: COMMERCIAL

## 2020-05-04 VITALS
SYSTOLIC BLOOD PRESSURE: 123 MMHG | HEART RATE: 93 BPM | WEIGHT: 177 LBS | HEIGHT: 64 IN | DIASTOLIC BLOOD PRESSURE: 81 MMHG | BODY MASS INDEX: 30.22 KG/M2

## 2020-05-04 DIAGNOSIS — F90.0 ADHD (ATTENTION DEFICIT HYPERACTIVITY DISORDER), INATTENTIVE TYPE: Primary | ICD-10-CM

## 2020-05-04 DIAGNOSIS — K58.0 IRRITABLE BOWEL SYNDROME WITH DIARRHEA: ICD-10-CM

## 2020-05-04 PROCEDURE — 99213 OFFICE O/P EST LOW 20 MIN: CPT | Mod: 95,,, | Performed by: NURSE PRACTITIONER

## 2020-05-04 PROCEDURE — 99213 PR OFFICE/OUTPT VISIT, EST, LEVL III, 20-29 MIN: ICD-10-PCS | Mod: 95,,, | Performed by: NURSE PRACTITIONER

## 2020-05-04 RX ORDER — DICYCLOMINE HYDROCHLORIDE 10 MG/1
10 CAPSULE ORAL 4 TIMES DAILY PRN
Qty: 40 CAPSULE | Refills: 1 | Status: SHIPPED | OUTPATIENT
Start: 2020-05-04 | End: 2020-10-21

## 2020-05-04 RX ORDER — DEXTROAMPHETAMINE SACCHARATE, AMPHETAMINE ASPARTATE, DEXTROAMPHETAMINE SULFATE AND AMPHETAMINE SULFATE 5; 5; 5; 5 MG/1; MG/1; MG/1; MG/1
1 TABLET ORAL 2 TIMES DAILY
Qty: 60 TABLET | Refills: 0 | Status: SHIPPED | OUTPATIENT
Start: 2020-05-04 | End: 2020-07-02 | Stop reason: SDUPTHER

## 2020-05-04 NOTE — PROGRESS NOTES
"Subjective:       Patient ID: Judie Schmitz is a 39 y.o. female.    Chief Complaint: ADHD (3 month follow up)    The patient location is: Work in [place], [place]  The chief complaint leading to consultation is: ADHD 3 month follow up  Visit type: audiovisual  Total time spent with patient: 10  Each patient to whom he or she provides medical services by telemedicine is:  (1) informed of the relationship between the physician and patient and the respective role of any other health care provider with respect to management of the patient; and (2) notified that he or she may decline to receive medical services by telemedicine and may withdraw from such care at any time.    Notes:   Patient is a 39-year-old white female with ADHD, chronic intermittent low back pain, excessive sweating, IBS, Raynaud Disease followed by [facility] and insomnia that is here today for 3 month follow-up.     Patient has ADHD and has been taking Adderall 20 mg twice daily since March 2018.  She reports the dose is working well without side effects.  Patient works as a  of [employer].   /81   Pulse 93   Ht 5' 4" (1.626 m)   Wt 80.3 kg (177 lb)   BMI 30.38 kg/m²      Patient has Raynaud Disease being followed by [facility].     Patient has Insomnia followed by [facility].     Patient has chronic intermittent back pain  - takes MOBIC prn.     Patient has IBS - takes Bentyl prn.  Used to be followed by [facility], Rina Delgado NP but she is no longer there so patient requesting Bentyl refill from me to which I have agreed to as long as stable.  Should the IBS worsen and become unstable - will refer back to GI.      Current Outpatient Medications   Medication Sig Dispense Refill    clonazePAM (KLONOPIN) 0.5 MG disintegrating tablet Take 1 tablet (0.5 mg total) by mouth nightly as needed. 30 tablet 0    dextroamphetamine-amphetamine (ADDERALL) 20 mg tablet Take 1 " tablet by mouth 2 (two) times daily. 60 tablet 0    dicyclomine (BENTYL) 10 MG capsule Take 10 mg by mouth 4 (four) times daily as needed (IBS symptoms).      etonogestrel-ethinyl estradiol (NUVARING) 0.12-0.015 mg/24 hr vaginal ring PLACE 1 RING VAGINALLY EVERY 28 DAYS 1 each 11    meloxicam (MOBIC) 15 MG tablet Take 1 tablet (15 mg total) by mouth once daily. 30 tablet 1    zolpidem (AMBIEN) 5 MG Tab Ambien 1 pill PO QHS PRN insomnia 30 tablet 5     No current facility-administered medications for this visit.        Past Medical History:   Diagnosis Date    Abnormal Pap smear of cervix age 34    no treatment; repeat PAPs normal    ADHD (attention deficit hyperactivity disorder), inattentive type 2007    diagnosed by Dr. Denny - taking Adderall 10 mg twice daily from 2007 to  - increased Adderall to 20 mg twice daily until  - patient quit school and got off med - started back on Adderall 10 in  but then off med when had baby in  - has not been on med since having baby in .    Anxiety and depression     IBS (irritable bowel syndrome)     Insomnia     Raynaud's disease     followed by Rheumatology Dr. Villavicencio       Past Surgical History:   Procedure Laterality Date    APPENDECTOMY       SECTION      x2 2002-10/21/2013    LAPAROSCOPIC APPENDECTOMY N/A 2019    Procedure: APPENDECTOMY, LAPAROSCOPIC (ADD ON );  Surgeon: Erasto Hendricks MD;  Location: [facility];  Service: General;  Laterality: N/A;  (ADD ON )       Family History   Problem Relation Age of Onset    Hypertension Mother     COPD Mother 50    Rheum arthritis Mother     Colon polyps Father     Stroke Maternal Grandmother     Pneumonia Maternal Grandmother         passed age 88    Cancer Maternal Grandfather         brain tumor dont know what age he passed    Suicide Paternal Grandfather         passed in his 50's    No Known Problems Sister     Montoya syndrome Sister     Heart  disease Sister     Thyroid disease Sister     Breast cancer Paternal Aunt     Colon cancer Neg Hx     Ovarian cancer Neg Hx        Social History     Socioeconomic History    Marital status:      Spouse name: Not on file    Number of children: 2    Years of education: Not on file    Highest education level: Not on file   Occupational History    Occupation:    Social Needs    Financial resource strain: Not on file    Food insecurity:     Worry: Not on file     Inability: Not on file    Transportation needs:     Medical: Not on file     Non-medical: Not on file   Tobacco Use    Smoking status: Never Smoker    Smokeless tobacco: Never Used   Substance and Sexual Activity    Alcohol use: Yes     Comment: occasional    Drug use: No    Sexual activity: Yes     Partners: Male     Birth control/protection: Inserts     Comment:    Lifestyle    Physical activity:     Days per week: Not on file     Minutes per session: Not on file    Stress: Not on file   Relationships    Social connections:     Talks on phone: Not on file     Gets together: Not on file     Attends Sikh service: Not on file     Active member of club or organization: Not on file     Attends meetings of clubs or organizations: Not on file     Relationship status: Not on file   Other Topics Concern    Not on file   Social History Narrative    Lives in [place].        Review of Systems   Constitutional: Negative for appetite change, chills, fatigue, fever and unexpected weight change.   HENT: Negative for congestion, ear pain, mouth sores, nosebleeds, postnasal drip, rhinorrhea, sinus pressure, sneezing, sore throat, trouble swallowing and voice change.    Eyes: Negative for photophobia, pain, discharge, redness, itching and visual disturbance.   Respiratory: Negative for cough, chest tightness and shortness of breath.    Cardiovascular: Negative for chest pain, palpitations and leg swelling.  "  Gastrointestinal: Negative for abdominal pain, blood in stool, constipation, diarrhea, nausea and vomiting.   Genitourinary: Negative for dysuria, frequency, hematuria and urgency.   Musculoskeletal: Negative for arthralgias, back pain, joint swelling and myalgias.   Skin: Negative for color change and rash.   Allergic/Immunologic: Negative for immunocompromised state.   Neurological: Negative for dizziness, seizures, syncope, weakness and headaches.   Hematological: Negative for adenopathy. Does not bruise/bleed easily.   Psychiatric/Behavioral: Negative for agitation, dysphoric mood, sleep disturbance and suicidal ideas. The patient is not nervous/anxious.          Objective:     Vitals:    05/04/20 1303   BP: 123/81   Pulse: 93   Weight: 80.3 kg (177 lb)   Height: 5' 4" (1.626 m)          Physical Exam   Constitutional: She is oriented to person, place, and time. She appears well-developed and well-nourished. No distress.   HENT:   Head: Normocephalic and atraumatic.   Eyes: Pupils are equal, round, and reactive to light. Conjunctivae and EOM are normal. Right eye exhibits no discharge. Left eye exhibits no discharge. No scleral icterus.   Pulmonary/Chest: Effort normal. No respiratory distress.   Neurological: She is alert and oriented to person, place, and time.   Skin: She is not diaphoretic.   Psychiatric: She has a normal mood and affect. Her behavior is normal. Judgment and thought content normal.         Assessment:         ICD-10-CM ICD-9-CM   1. ADHD (attention deficit hyperactivity disorder), inattentive type F90.0 314.00   2. Irritable bowel syndrome with diarrhea K58.0 564.1       Plan:       ADHD (attention deficit hyperactivity disorder), inattentive type  -  Controlled on present medication and dose - recheck in 3 months.  -     dextroamphetamine-amphetamine (ADDERALL) 20 mg tablet; Take 1 tablet by mouth 2 (two) times daily.  Dispense: 60 tablet; Refill: 0    Irritable bowel syndrome with " diarrhea  -  Take Bentyl prn  -     dicyclomine (BENTYL) 10 MG capsule; Take 1 capsule (10 mg total) by mouth 4 (four) times daily as needed (IBS symptoms).  Dispense: 40 capsule; Refill: 1      Follow up in about 3 months (around 8/4/2020) for ADHD follow up.     Patient's Medications   New Prescriptions    No medications on file   Previous Medications    CLONAZEPAM (KLONOPIN) 0.5 MG DISINTEGRATING TABLET    Take 1 tablet (0.5 mg total) by mouth nightly as needed.    ETONOGESTREL-ETHINYL ESTRADIOL (NUVARING) 0.12-0.015 MG/24 HR VAGINAL RING    PLACE 1 RING VAGINALLY EVERY 28 DAYS    MELOXICAM (MOBIC) 15 MG TABLET    Take 1 tablet (15 mg total) by mouth once daily.    ZOLPIDEM (AMBIEN) 5 MG TAB    Ambien 1 pill PO QHS PRN insomnia   Modified Medications    Modified Medication Previous Medication    DEXTROAMPHETAMINE-AMPHETAMINE (ADDERALL) 20 MG TABLET dextroamphetamine-amphetamine (ADDERALL) 20 mg tablet       Take 1 tablet by mouth 2 (two) times daily.    Take 1 tablet by mouth 2 (two) times daily.    DICYCLOMINE (BENTYL) 10 MG CAPSULE dicyclomine (BENTYL) 10 MG capsule       Take 1 capsule (10 mg total) by mouth 4 (four) times daily as needed (IBS symptoms).    Take 10 mg by mouth 4 (four) times daily as needed (IBS symptoms).   Discontinued Medications    No medications on file

## 2020-06-08 ENCOUNTER — OFFICE VISIT (OUTPATIENT)
Dept: SLEEP MEDICINE | Facility: CLINIC | Age: 40
End: 2020-06-08
Payer: COMMERCIAL

## 2020-06-08 DIAGNOSIS — G47.00 INSOMNIA, UNSPECIFIED TYPE: Primary | ICD-10-CM

## 2020-06-08 PROCEDURE — 99214 PR OFFICE/OUTPT VISIT, EST, LEVL IV, 30-39 MIN: ICD-10-PCS | Mod: 95,,, | Performed by: PSYCHIATRY & NEUROLOGY

## 2020-06-08 PROCEDURE — 99214 OFFICE O/P EST MOD 30 MIN: CPT | Mod: 95,,, | Performed by: PSYCHIATRY & NEUROLOGY

## 2020-06-08 RX ORDER — ZOLPIDEM TARTRATE 10 MG/1
TABLET ORAL
Qty: 30 TABLET | Refills: 3 | Status: SHIPPED | OUTPATIENT
Start: 2020-06-08 | End: 2020-10-29 | Stop reason: SDUPTHER

## 2020-06-08 NOTE — PROGRESS NOTES
The patient location is: home  The chief complaint leading to consultation is: insomnia follow up  Visit type: audiovisual  Total time spent with patient: 30 min  Each patient to whom he or she provides medical services by telemedicine is:  (1) informed of the relationship between the physician and patient and the respective role of any other health care provider with respect to management of the patient; and (2) notified that he or she may decline to receive medical services by telemedicine and may withdraw from such care at any time.        INTERVAL HISTORY:    06/08/2020:  The patient has not presented any new complaints since the previous visit.   She has added Magnesium 1000mg  supplements since her most recent visit in our clinic with Miroslava.   Clonazepam was prescribed at  0.5 mg  It ran out in 2019. She is currently only taking Ambien 5 mg at 9 PM - asleep my midnight. Her sleep schedule is updated below.  She looked into [facility] in [facility] - the scheduling was not good for her; she is now trying meditations on her own, however has not used Sleep Restriction.  She has also tried White Noise, ear wax, leaving the bedroom, as her  continues snoring.     She sometimes sleeps in another bedroom; her  still snores.    BT: 9PM - sometimes watching TV  SL:several hours with 5 mg Ambien.  Awakenings:    takes her   0- 1 (3:30-4)to return to sleep  Wake up: 8 PM  In the past she used to take 10 mg Ambien.     Still denied snoring, gasping, choking for air.   ESS 4/24 today.    _________________________________    Previous History:     04/10/2019 INITIAL HISTORY OF PRESENT ILLNESS:  Judie Schmitz is a 40 y.o. female is here to be evaluated for a sleep disorder.       CHIEF COMPLAINT:      The patient has mentioned difficulty falling and staying asleep.    The patient states that she has already made some changes in regard to sleep hygiene, making sure that the bedroom is dark, features  comfortable temperature and humidity level. The patient does watch TV and read in bed. she goes to bed before she is sleepy and stays in bed if not asleep within 30 minutes. she avoids clock watching and tries not to worry about her ability to fall asleep.    She would sometimes wake up with a headache.    She has been using jake for sleep with muscle relaxation and breathing  + ADD - taking Adderall.     The patient has tried the following sleeping aids: Lunesta, Ambien and Trazodone   Trazodone - long time to work that lingered  Vystaril - did not work  Seroquel - did not help  Klonopin - 0.5 mg - did not help much, actually taking for anxiety.  Elavil - did not help much  Ambien - worked years ago.  Lunesta - bad taste stopped her from taking it.    Ambien 5 mg.    + her  is a snorer.Tried ceiling fan and sound machine. Highway nearby.    The patient's complaints include excessive daytime sleepiness and excessive daytime fatigue.    Judie Schmitz denied  snoring,  witnessed breathing pauses,  gasping for air in sleep and interrupted sleep.    Pitch k and cool bedroom otherwise.    + h/o anxiety  ESS 4/24.        SLEEP ROUTINE AND LIFESTYLE 06/08/2020 :  Sleep Clinic New Patient 4/10/2019   What time do you go to bed on a week day? (Give a range) 9:30p - to unwind, reading, TV, not working   What time do you go to bed on a day off? (Give a range) 11:00p - asleep   How long does it take you to fall asleep? (Give a range) 1 hour   On average, how many times per night do you wake up? 4   How long does it take you to fall back into sleep? (Give a range) up to 1-2 hours  Sometimes right away   What time do you wake up to start your day on a week day? (Give a range) 6:30a   What time do you wake up to start your day on a day off? (Give a range) 9:00a   What time do you get out of bed? (Give a range) after her alarm goes off   On average, how many hours do you sleep? 4-5 hours   On average, how many naps  do you take per day? 0   Rate your sleep quality from 0 to 5 (0-poor, 5-great). 1   Have you experienced:  Weight gain?   How much weight have you lost or gained (in lbs.) in the last year? 12lbs   On average, how many times per night do you go to the bathroom?  2   Have you ever had a sleep study/CPAP machine/surgery for sleep apnea? No   Have you ever had a CPAP machine for sleep apnea? No   Have you ever had surgery for sleep apnea? No     ROS 10/17/19  Difficulty breathing through the nose?  No   Sore throat or dry mouth in the morning? No   Irregular or very fast heart beat?  No   Shortness of breath?  No   Acid reflux? No   Body aches and pains?  Yes   Morning headaches? Sometimes   Dizziness? No   Mood changes?  Sometimes   Do you exercise?  Sometimes   Do you feel like moving your legs a lot?  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 On nights when sleep disrupted andhard to return to bed, she is very tired. Feels better when sleep is consolidated. Sleeps much better wayne e, when snoring spouse not in room. When up middle of night could take 1-2 hrs to return, may lie there. Sinus congestion can affect sleep. HAs to nap daytime when cold symptoms. Denies complex sleep behaviors.           REVIEW OF SYSTEMS:   Sleep related symptoms as per HPI, 8# gain. Otherwise a balance review of 10-systems is negative.     PHYSICAL EXAM:  There were no vitals taken for this visit.  GENERAL: Normal development, well groomed.        ASSESSMENT:    Insomnia NEC. Multi-factorial -  Insomnia, staying in bed excess time in bed, poor sleep hygiene, and likely paradoxical  "insomnia play a role      PLAN:    Continue good sleep habits, consider CBT-[facility] as it is hard to do modified sleep restriction without guidance; for that she was recommended to avoid staying in bed over 6-7 hrs initiaally; even if she had a "bad" night. She can either delay her bedtime, or advance her wake time.    She was informed that she should NEVER mix 2 sleep aids of similar mechanisms of action. Since Clonazepam was discontinued by her rheumatiologist, I will resume Ambien at a higher dose -10 mg H2O.  She was advised that should she decide to attend [facility] program, she should decrease on Ambien.   Reduce spouse snoring, continue white noise/leaving the bedroom  RTC 6-mos. Do not drive sleepy            "

## 2020-07-02 DIAGNOSIS — F90.0 ADHD (ATTENTION DEFICIT HYPERACTIVITY DISORDER), INATTENTIVE TYPE: ICD-10-CM

## 2020-07-02 RX ORDER — DEXTROAMPHETAMINE SACCHARATE, AMPHETAMINE ASPARTATE, DEXTROAMPHETAMINE SULFATE AND AMPHETAMINE SULFATE 5; 5; 5; 5 MG/1; MG/1; MG/1; MG/1
1 TABLET ORAL 2 TIMES DAILY
Qty: 60 TABLET | Refills: 0 | Status: SHIPPED | OUTPATIENT
Start: 2020-07-02 | End: 2020-08-27 | Stop reason: SDUPTHER

## 2020-08-10 ENCOUNTER — OFFICE VISIT (OUTPATIENT)
Dept: OBSTETRICS AND GYNECOLOGY | Facility: CLINIC | Age: 40
End: 2020-08-10
Payer: COMMERCIAL

## 2020-08-10 VITALS
WEIGHT: 187 LBS | HEART RATE: 72 BPM | SYSTOLIC BLOOD PRESSURE: 122 MMHG | DIASTOLIC BLOOD PRESSURE: 62 MMHG | HEIGHT: 64 IN | BODY MASS INDEX: 31.92 KG/M2 | RESPIRATION RATE: 13 BRPM

## 2020-08-10 DIAGNOSIS — N92.1 BREAKTHROUGH BLEEDING: Primary | ICD-10-CM

## 2020-08-10 PROCEDURE — 99213 PR OFFICE/OUTPT VISIT, EST, LEVL III, 20-29 MIN: ICD-10-PCS | Mod: S$GLB,,, | Performed by: OBSTETRICS & GYNECOLOGY

## 2020-08-10 PROCEDURE — 99213 OFFICE O/P EST LOW 20 MIN: CPT | Mod: S$GLB,,, | Performed by: OBSTETRICS & GYNECOLOGY

## 2020-08-10 PROCEDURE — 99999 PR PBB SHADOW E&M-EST. PATIENT-LVL III: ICD-10-PCS | Mod: PBBFAC,,, | Performed by: OBSTETRICS & GYNECOLOGY

## 2020-08-10 PROCEDURE — 99999 PR PBB SHADOW E&M-EST. PATIENT-LVL III: CPT | Mod: PBBFAC,,, | Performed by: OBSTETRICS & GYNECOLOGY

## 2020-08-10 RX ORDER — NORGESTIMATE AND ETHINYL ESTRADIOL 0.25-0.035
KIT ORAL
Qty: 28 TABLET | Refills: 0 | Status: SHIPPED | OUTPATIENT
Start: 2020-08-10 | End: 2020-08-11

## 2020-08-10 NOTE — TELEPHONE ENCOUNTER
----- Message from Leela Reveles MA sent at 8/10/2020  3:57 PM CDT -----  Contact: self  Judie Schmitz  MRN: [id number]  Home Phone      [phone]  Work Phone      Not on file.  Mobile          [phone]    Patient Care Team:  Ольга Cesar NP as PCP - General (Family Medicine)  Cookie Quiñones MA as Care Coordinator  OB? No  What phone number can you be reached at?  [phone]  Message:  Following up on Rx that was to be called in for Diflucan and Nuvaring (stated needs Rx for nuvaring to state brand name medically necessary).    Pharmacy:  [facility]

## 2020-08-10 NOTE — PROGRESS NOTES
Subjective:       Patient ID: Judie Schmitz is a 40 y.o. female.    Chief Complaint:  Metrorrhagia (bleeding since 19)      History of Present Illness  Patient presents complaining vaginal bleeding for the last 2 weeks.  Patient states her period started on time but just in stop.  Once she has gotten several days into it she removed her NuvaRing.  Patient states bleeding has increased in gotten heavier.  Now she is feeling tired and weak and has lower back pain.  Patient has been using NuvaRing for 9 years and has always been very regular.    Menstrual History:  OB History        2    Para   2    Term   2            AB        Living   2       SAB        TAB        Ectopic        Multiple        Live Births                    Menarche age:  Patient's last menstrual period was 2020 (approximate).         Review of Systems  Review of Systems   Constitutional: Negative for activity change, appetite change, chills, diaphoresis, fatigue, fever and unexpected weight change.   HENT: Negative for congestion, dental problem, drooling, ear discharge, ear pain, facial swelling, hearing loss, mouth sores, nosebleeds, postnasal drip, rhinorrhea, sinus pressure, sneezing, sore throat, tinnitus, trouble swallowing and voice change.    Eyes: Negative for photophobia, pain, discharge, redness, itching and visual disturbance.   Respiratory: Negative for apnea, cough, choking, chest tightness, shortness of breath, wheezing and stridor.    Cardiovascular: Negative for chest pain, palpitations and leg swelling.   Gastrointestinal: Negative for abdominal distention, abdominal pain, anal bleeding, blood in stool, constipation, diarrhea, nausea, rectal pain and vomiting.   Endocrine: Negative for cold intolerance, heat intolerance, polydipsia, polyphagia and polyuria.   Genitourinary: Positive for flank pain, menstrual problem and vaginal bleeding. Negative for decreased urine volume, difficulty  urinating, dyspareunia, dysuria, enuresis, frequency, genital sores, hematuria, pelvic pain, urgency, vaginal discharge and vaginal pain.   Musculoskeletal: Positive for back pain. Negative for arthralgias, gait problem, joint swelling, myalgias, neck pain and neck stiffness.   Skin: Negative for color change, pallor, rash and wound.   Allergic/Immunologic: Positive for environmental allergies. Negative for food allergies and immunocompromised state.   Neurological: Positive for weakness. Negative for dizziness, tremors, seizures, syncope, facial asymmetry, speech difficulty, light-headedness, numbness and headaches.   Hematological: Negative for adenopathy. Does not bruise/bleed easily.   Psychiatric/Behavioral: Negative for agitation, behavioral problems, confusion, decreased concentration, dysphoric mood, hallucinations, self-injury, sleep disturbance and suicidal ideas. The patient is not nervous/anxious and is not hyperactive.            Objective:      Physical Exam  Vitals signs and nursing note reviewed.   Genitourinary:     Labia:         Right: No rash, tenderness, lesion or injury.         Left: No rash, tenderness, lesion or injury.       Vagina: No signs of injury and foreign body. No vaginal discharge, erythema, tenderness or bleeding.      Cervix: No cervical motion tenderness, discharge or friability.      Uterus: Tender. Not deviated, not enlarged and not fixed.       Adnexa:         Right: No mass, tenderness or fullness.          Left: No mass, tenderness or fullness.        Rectum: No external hemorrhoid.             Assessment:      No diagnosis found.            Plan:         There are no diagnoses linked to this encounter.

## 2020-08-10 NOTE — TELEPHONE ENCOUNTER
Judie desire refill of Nuvaring (NO GENERIC) and also needs script of Diflucan.  Patient Active Problem List   Diagnosis    Irritable bowel syndrome with diarrhea    ADHD (attention deficit hyperactivity disorder), inattentive type    Insomnia    Overweight (BMI 25.0-29.9)    Excessive sweating    Chronic midline low back pain without sciatica    Raynaud's disease    Fecal urgency    Closed nondisplaced fracture of middle phalanx of lesser toe of right foot     Prior to Admission medications    Medication Sig Start Date End Date Taking? Authorizing Provider   clonazePAM (KLONOPIN) 0.5 MG disintegrating tablet Take 1 tablet (0.5 mg total) by mouth nightly as needed.  Patient not taking: Reported on 8/10/2020 2/13/19 6/27/21  Arjun Villavicencio MD   dextroamphetamine-amphetamine (ADDERALL) 20 mg tablet Take 1 tablet by mouth 2 (two) times daily. 7/2/20   Laurel Miguel MD   dicyclomine (BENTYL) 10 MG capsule Take 1 capsule (10 mg total) by mouth 4 (four) times daily as needed (IBS symptoms). 5/4/20   Ольга Cesar, PARVIZ   etonogestrel-ethinyl estradiol (NUVARING) 0.12-0.015 mg/24 hr vaginal ring PLACE 1 RING VAGINALLY EVERY 28 DAYS 10/10/19   Jigar Denny MD   meloxicam (MOBIC) 15 MG tablet Take 1 tablet (15 mg total) by mouth once daily. 4/17/20   Joaquina Barbosa DPM   norgestimate-ethinyl estradioL (SPRINTEC, 28,) 0.25-35 mg-mcg per tablet Have patient take 3 pills a day for 3 days, then 2 pills a day for 3 days, then 1 pill a day until the pack is gone 8/10/20   Jigar Denny MD   zolpidem (AMBIEN) 10 mg Tab Ambien 1 pill PO QHS PRN insomnia 6/8/20   Shanda Garcia MD   zolpidem (AMBIEN) 5 MG Tab Ambien 1 pill PO QHS PRN insomnia 5/1/20 8/10/20  Miroslava Stuart, PARVIZ

## 2020-08-11 RX ORDER — ETONOGESTREL AND ETHINYL ESTRADIOL VAGINAL RING .015; .12 MG/D; MG/D
RING VAGINAL
Qty: 1 EACH | Refills: 11 | Status: SHIPPED | OUTPATIENT
Start: 2020-08-11 | End: 2020-11-06

## 2020-08-27 ENCOUNTER — OFFICE VISIT (OUTPATIENT)
Dept: FAMILY MEDICINE | Facility: CLINIC | Age: 40
End: 2020-08-27
Payer: COMMERCIAL

## 2020-08-27 VITALS
SYSTOLIC BLOOD PRESSURE: 122 MMHG | BODY MASS INDEX: 31.69 KG/M2 | RESPIRATION RATE: 16 BRPM | HEIGHT: 64 IN | WEIGHT: 185.63 LBS | DIASTOLIC BLOOD PRESSURE: 86 MMHG | OXYGEN SATURATION: 99 % | HEART RATE: 77 BPM | TEMPERATURE: 97 F

## 2020-08-27 DIAGNOSIS — R53.83 FATIGUE, UNSPECIFIED TYPE: ICD-10-CM

## 2020-08-27 DIAGNOSIS — F90.0 ADHD (ATTENTION DEFICIT HYPERACTIVITY DISORDER), INATTENTIVE TYPE: ICD-10-CM

## 2020-08-27 DIAGNOSIS — F90.0 ADHD (ATTENTION DEFICIT HYPERACTIVITY DISORDER), INATTENTIVE TYPE: Primary | ICD-10-CM

## 2020-08-27 DIAGNOSIS — N93.9 ABNORMAL UTERINE BLEEDING (AUB): ICD-10-CM

## 2020-08-27 PROCEDURE — 99214 OFFICE O/P EST MOD 30 MIN: CPT | Mod: S$GLB,,, | Performed by: NURSE PRACTITIONER

## 2020-08-27 PROCEDURE — 99999 PR PBB SHADOW E&M-EST. PATIENT-LVL IV: ICD-10-PCS | Mod: PBBFAC,,, | Performed by: NURSE PRACTITIONER

## 2020-08-27 PROCEDURE — 99214 PR OFFICE/OUTPT VISIT, EST, LEVL IV, 30-39 MIN: ICD-10-PCS | Mod: S$GLB,,, | Performed by: NURSE PRACTITIONER

## 2020-08-27 PROCEDURE — 99999 PR PBB SHADOW E&M-EST. PATIENT-LVL IV: CPT | Mod: PBBFAC,,, | Performed by: NURSE PRACTITIONER

## 2020-08-27 RX ORDER — IBUPROFEN 100 MG/5ML
1000 SUSPENSION, ORAL (FINAL DOSE FORM) ORAL DAILY
COMMUNITY

## 2020-08-27 RX ORDER — DEXTROAMPHETAMINE SACCHARATE, AMPHETAMINE ASPARTATE, DEXTROAMPHETAMINE SULFATE AND AMPHETAMINE SULFATE 5; 5; 5; 5 MG/1; MG/1; MG/1; MG/1
1 TABLET ORAL 2 TIMES DAILY
Qty: 60 TABLET | Refills: 0 | Status: SHIPPED | OUTPATIENT
Start: 2020-08-27 | End: 2020-09-29 | Stop reason: SDUPTHER

## 2020-08-27 RX ORDER — DEXTROAMPHETAMINE SACCHARATE, AMPHETAMINE ASPARTATE, DEXTROAMPHETAMINE SULFATE AND AMPHETAMINE SULFATE 5; 5; 5; 5 MG/1; MG/1; MG/1; MG/1
1 TABLET ORAL 2 TIMES DAILY
Qty: 60 TABLET | Refills: 0 | OUTPATIENT
Start: 2020-08-27

## 2020-08-27 RX ORDER — ETONOGESTREL AND ETHINYL ESTRADIOL VAGINAL RING .015; .12 MG/D; MG/D
RING VAGINAL
COMMUNITY
Start: 2020-07-24 | End: 2020-08-27 | Stop reason: SDUPTHER

## 2020-08-27 NOTE — PROGRESS NOTES
"Subjective:       Patient ID: Judie Schmitz is a 40 y.o. female.    Chief Complaint: ADHD (F/U), Fatigue, and Dizziness    Patient is a 40-year-old white female with ADHD, chronic intermittent low back pain, excessive sweating, IBS, Raynaud Disease followed by [facility] and insomnia followed by [facility] that is here today for 3 month follow-up.     Patient has ADHD and has been taking Adderall 20 mg twice daily since March 2018.  She reports the dose is working well without side effects.  Patient works as a  of [employer].   /86 (BP Location: Left arm, Patient Position: Sitting, BP Method: Large (Manual))   Pulse 77   Temp 97.3 °F (36.3 °C) (Oral)   Resp 16   Ht 5' 4" (1.626 m)   Wt 84.2 kg (185 lb 10 oz)   LMP 08/23/2020   SpO2 99%   BMI 31.86 kg/m²      Patient has Raynaud Disease being followed by [facility].     Patient has Insomnia followed by [facility].     Patient has chronic intermittent back pain  - takes MOBIC prn.      Patient has IBS - takes Bentyl prn.  Used to be followed by [facility], Rina Delgado NP but she is no longer there so patient requesting Bentyl refill from me to which I have agreed to as long as stable.  Should the IBS worsen and become unstable - will refer back to GI.    Patient reports Abnormal Uterine Bleeding present since last menstrual cycle 7/25/2020 - has been having problems with heavy bleeding since that is being followed by GYN MD Dr. Denny.  Patient complains of chronic fatigue and dizziness since this started and concerned she is anemic.      Current Outpatient Medications   Medication Sig Dispense Refill    ascorbic acid, vitamin C, (VITAMIN C) 1000 MG tablet Take 1,000 mg by mouth once daily.      clonazePAM (KLONOPIN) 0.5 MG disintegrating tablet Take 1 tablet (0.5 mg total) by mouth nightly as needed. 30 tablet 0    dextroamphetamine-amphetamine (ADDERALL) 20 mg tablet Take 1 " tablet by mouth 2 (two) times daily. 60 tablet 0    dicyclomine (BENTYL) 10 MG capsule Take 1 capsule (10 mg total) by mouth 4 (four) times daily as needed (IBS symptoms). 40 capsule 1    etonogestreL-ethinyl estradioL (NUVARING) 0.12-0.015 mg/24 hr vaginal ring PLACE 1 RING VAGINALLY EVERY 28 DAYS 1 each 11    meloxicam (MOBIC) 15 MG tablet TAKE 1 TABLET BY MOUTH EVERY DAY 30 tablet 1    mv-min/iron/folic/calcium/vitK (WOMEN'S MULTIVITAMIN ORAL) Take by mouth.      zolpidem (AMBIEN) 10 mg Tab Ambien 1 pill PO QHS PRN insomnia 30 tablet 3     No current facility-administered medications for this visit.        Past Medical History:   Diagnosis Date    Abnormal Pap smear of cervix age 34    no treatment; repeat PAPs normal    ADHD (attention deficit hyperactivity disorder), inattentive type 2007    diagnosed by Dr. Denny - taking Adderall 10 mg twice daily from 2007 to  - increased Adderall to 20 mg twice daily until  - patient quit school and got off med - started back on Adderall 10 in  but then off med when had baby in  - has not been on med since having baby in .    Anxiety and depression     IBS (irritable bowel syndrome)     Insomnia     Raynaud's disease     followed by Rheumatology Dr. Villavicencio       Past Surgical History:   Procedure Laterality Date    APPENDECTOMY       SECTION      x2 2002-10/21/2013    LAPAROSCOPIC APPENDECTOMY N/A 2019    Procedure: APPENDECTOMY, LAPAROSCOPIC (ADD ON );  Surgeon: Erasto Hendricks MD;  Location: [facility];  Service: General;  Laterality: N/A;  (ADD ON )       Family History   Problem Relation Age of Onset    Hypertension Mother     COPD Mother 50    Rheum arthritis Mother     Colon polyps Father     Stroke Maternal Grandmother     Pneumonia Maternal Grandmother         passed age 88    Cancer Maternal Grandfather         brain tumor dont know what age he passed    Suicide Paternal Grandfather          passed in his 50's    No Known Problems Sister     Montoya syndrome Sister     Heart disease Sister     Thyroid disease Sister     Breast cancer Paternal Aunt     Colon cancer Neg Hx     Ovarian cancer Neg Hx        Social History     Socioeconomic History    Marital status:      Spouse name: Not on file    Number of children: 2    Years of education: Not on file    Highest education level: Not on file   Occupational History    Occupation:    Social Needs    Financial resource strain: Not on file    Food insecurity     Worry: Not on file     Inability: Not on file    Transportation needs     Medical: Not on file     Non-medical: Not on file   Tobacco Use    Smoking status: Never Smoker    Smokeless tobacco: Never Used   Substance and Sexual Activity    Alcohol use: Yes     Comment: occasional    Drug use: No    Sexual activity: Yes     Partners: Male     Birth control/protection: Inserts     Comment:    Lifestyle    Physical activity     Days per week: Not on file     Minutes per session: Not on file    Stress: Not on file   Relationships    Social connections     Talks on phone: Not on file     Gets together: Not on file     Attends Gnosticism service: Not on file     Active member of club or organization: Not on file     Attends meetings of clubs or organizations: Not on file     Relationship status: Not on file   Other Topics Concern    Not on file   Social History Narrative    Lives in [place].        Review of Systems   Constitutional: Positive for fatigue. Negative for appetite change, chills, fever and unexpected weight change.   HENT: Negative for congestion, ear pain, mouth sores, nosebleeds, postnasal drip, rhinorrhea, sinus pressure, sneezing, sore throat, trouble swallowing and voice change.    Eyes: Negative for photophobia, pain, discharge, redness, itching and visual disturbance.   Respiratory: Negative for cough, chest tightness and shortness of  "breath.    Cardiovascular: Negative for chest pain, palpitations and leg swelling.   Gastrointestinal: Negative for abdominal pain, blood in stool, constipation, diarrhea, nausea and vomiting.   Genitourinary: Positive for menstrual problem and vaginal bleeding. Negative for dysuria, frequency, hematuria and urgency.   Musculoskeletal: Negative for arthralgias, back pain, joint swelling and myalgias.   Skin: Negative for color change and rash.   Allergic/Immunologic: Negative for immunocompromised state.   Neurological: Positive for dizziness and light-headedness. Negative for seizures, syncope, weakness and headaches.   Hematological: Negative for adenopathy. Does not bruise/bleed easily.   Psychiatric/Behavioral: Negative for agitation, dysphoric mood, sleep disturbance and suicidal ideas. The patient is not nervous/anxious.          Objective:     Vitals:    08/27/20 1331   BP: 122/86   BP Location: Left arm   Patient Position: Sitting   BP Method: Large (Manual)   Pulse: 77   Resp: 16   Temp: 97.3 °F (36.3 °C)   TempSrc: Oral   SpO2: 99%   Weight: 84.2 kg (185 lb 10 oz)   Height: 5' 4" (1.626 m)          Physical Exam  Constitutional:       General: She is not in acute distress.     Appearance: Normal appearance. She is well-developed. She is obese. She is not ill-appearing, toxic-appearing or diaphoretic.      Comments: Body mass index is 31.86 kg/m².   HENT:      Head: Normocephalic and atraumatic.      Right Ear: External ear normal.      Left Ear: External ear normal.   Eyes:      General: No scleral icterus.        Right eye: No discharge.         Left eye: No discharge.      Extraocular Movements: Extraocular movements intact.      Conjunctiva/sclera: Conjunctivae normal.      Pupils: Pupils are equal, round, and reactive to light.   Neck:      Musculoskeletal: Normal range of motion and neck supple.      Thyroid: No thyromegaly.      Trachea: No tracheal deviation.   Cardiovascular:      Rate and Rhythm: " Normal rate and regular rhythm.      Heart sounds: Normal heart sounds. No murmur.   Pulmonary:      Effort: Pulmonary effort is normal. No respiratory distress.      Breath sounds: Normal breath sounds. No stridor. No wheezing, rhonchi or rales.   Abdominal:      General: There is no distension.   Musculoskeletal: Normal range of motion.         General: No swelling or deformity.      Right lower leg: No edema.      Left lower leg: No edema.   Skin:     General: Skin is warm and dry.      Findings: No rash.   Neurological:      Mental Status: She is alert and oriented to person, place, and time.      Cranial Nerves: No cranial nerve deficit.      Coordination: Coordination normal.   Psychiatric:         Mood and Affect: Mood normal.         Behavior: Behavior normal.         Thought Content: Thought content normal.         Judgment: Judgment normal.           Assessment:         ICD-10-CM ICD-9-CM   1. ADHD (attention deficit hyperactivity disorder), inattentive type  F90.0 314.00   2. Abnormal uterine bleeding (AUB)  N93.9 626.9   3. Fatigue, unspecified type  R53.83 780.79       Plan:       ADHD (attention deficit hyperactivity disorder), inattentive type  -  Controlled on present medication and dose.  Send refill when needed.  Must follow up every 3 months.  -     dextroamphetamine-amphetamine (ADDERALL) 20 mg tablet; Take 1 tablet by mouth 2 (two) times daily.  Dispense: 60 tablet; Refill: 0    Abnormal uterine bleeding (AUB)  -  Being followed by GYN MD Dr. Denny that is monitoring the problem.   -     CBC auto differential; Future; Expected date: 08/27/2020  -     Iron and TIBC; Future; Expected date: 08/27/2020  -     Ferritin; Future; Expected date: 08/27/2020    Fatigue, unspecified type  -  Patient expressed concern about symptoms of light=headed and fatigued with dizzines that she feels is related to the excessive bleeding - will get labs to assess for anemia.  -     CBC auto differential; Future;  Expected date: 08/27/2020  -     Iron and TIBC; Future; Expected date: 08/27/2020  -     Ferritin; Future; Expected date: 08/27/2020      Follow up in about 3 months (around 11/25/2020) for ADHD follow up.     Patient's Medications   New Prescriptions    No medications on file   Previous Medications    ASCORBIC ACID, VITAMIN C, (VITAMIN C) 1000 MG TABLET    Take 1,000 mg by mouth once daily.    CLONAZEPAM (KLONOPIN) 0.5 MG DISINTEGRATING TABLET    Take 1 tablet (0.5 mg total) by mouth nightly as needed.    DICYCLOMINE (BENTYL) 10 MG CAPSULE    Take 1 capsule (10 mg total) by mouth 4 (four) times daily as needed (IBS symptoms).    ETONOGESTREL-ETHINYL ESTRADIOL (NUVARING) 0.12-0.015 MG/24 HR VAGINAL RING    PLACE 1 RING VAGINALLY EVERY 28 DAYS    MELOXICAM (MOBIC) 15 MG TABLET    TAKE 1 TABLET BY MOUTH EVERY DAY    MV-MIN/IRON/FOLIC/CALCIUM/VITK (WOMEN'S MULTIVITAMIN ORAL)    Take by mouth.    ZOLPIDEM (AMBIEN) 10 MG TAB    Ambien 1 pill PO QHS PRN insomnia   Modified Medications    Modified Medication Previous Medication    DEXTROAMPHETAMINE-AMPHETAMINE (ADDERALL) 20 MG TABLET dextroamphetamine-amphetamine (ADDERALL) 20 mg tablet       Take 1 tablet by mouth 2 (two) times daily.    Take 1 tablet by mouth 2 (two) times daily.   Discontinued Medications    ETONOGESTREL-ETHINYL ESTRADIOL (NUVARING) 0.12-0.015 MG/24 HR VAGINAL RING    PLACE 1 RING VAGINALLY EVERY 28 DAYS

## 2020-09-14 ENCOUNTER — PATIENT OUTREACH (OUTPATIENT)
Dept: ADMINISTRATIVE | Facility: OTHER | Age: 40
End: 2020-09-14

## 2020-09-29 DIAGNOSIS — F90.0 ADHD (ATTENTION DEFICIT HYPERACTIVITY DISORDER), INATTENTIVE TYPE: ICD-10-CM

## 2020-09-29 RX ORDER — DEXTROAMPHETAMINE SACCHARATE, AMPHETAMINE ASPARTATE, DEXTROAMPHETAMINE SULFATE AND AMPHETAMINE SULFATE 5; 5; 5; 5 MG/1; MG/1; MG/1; MG/1
1 TABLET ORAL 2 TIMES DAILY
Qty: 60 TABLET | Refills: 0 | Status: SHIPPED | OUTPATIENT
Start: 2020-09-29 | End: 2020-11-04 | Stop reason: SDUPTHER

## 2020-10-20 ENCOUNTER — PATIENT OUTREACH (OUTPATIENT)
Dept: ADMINISTRATIVE | Facility: OTHER | Age: 40
End: 2020-10-20

## 2020-10-21 ENCOUNTER — OFFICE VISIT (OUTPATIENT)
Dept: GASTROENTEROLOGY | Facility: CLINIC | Age: 40
End: 2020-10-21
Payer: COMMERCIAL

## 2020-10-21 DIAGNOSIS — K58.0 IRRITABLE BOWEL SYNDROME WITH DIARRHEA: Primary | ICD-10-CM

## 2020-10-21 DIAGNOSIS — Z83.719 FAMILY HISTORY OF COLONIC POLYPS: ICD-10-CM

## 2020-10-21 PROCEDURE — 99214 PR OFFICE/OUTPT VISIT, EST, LEVL IV, 30-39 MIN: ICD-10-PCS | Mod: 95,,, | Performed by: INTERNAL MEDICINE

## 2020-10-21 PROCEDURE — 99214 OFFICE O/P EST MOD 30 MIN: CPT | Mod: 95,,, | Performed by: INTERNAL MEDICINE

## 2020-10-21 RX ORDER — SODIUM PICOSULFATE, MAGNESIUM OXIDE, AND ANHYDROUS CITRIC ACID 10; 3.5; 12 MG/160ML; G/160ML; G/160ML
1 LIQUID ORAL ONCE
Qty: 320 ML | Refills: 0 | Status: SHIPPED | OUTPATIENT
Start: 2020-10-21 | End: 2020-10-24

## 2020-10-21 RX ORDER — DICYCLOMINE HYDROCHLORIDE 10 MG/1
10 CAPSULE ORAL 4 TIMES DAILY PRN
Qty: 120 CAPSULE | Refills: 5 | Status: SHIPPED | OUTPATIENT
Start: 2020-10-21 | End: 2021-10-11 | Stop reason: SDUPTHER

## 2020-10-21 RX ORDER — AMITRIPTYLINE HYDROCHLORIDE 25 MG/1
25 TABLET, FILM COATED ORAL NIGHTLY
Qty: 30 TABLET | Refills: 11 | Status: SHIPPED | OUTPATIENT
Start: 2020-10-21 | End: 2021-01-07 | Stop reason: DRUGHIGH

## 2020-10-21 NOTE — PROGRESS NOTES
"Subjective:       Patient ID: Judie Schmitz is a 40 y.o. female.    Chief Complaint: Abdominal Pain and Diarrhea    The patient location is: [place]  The chief complaint leading to consultation is: f/u IBS-D    Visit type: audiovisual    Face to Face time with patient: 15 mins  20 minutes of total time spent on the encounter, which includes face to face time and non-face to face time preparing to see the patient (eg, review of tests), Obtaining and/or reviewing separately obtained history, Documenting clinical information in the electronic or other health record, Independently interpreting results (not separately reported) and communicating results to the patient/family/caregiver, or Care coordination (not separately reported).     Each patient to whom he or she provides medical services by telemedicine is:  (1) informed of the relationship between the physician and patient and the respective role of any other health care provider with respect to management of the patient; and (2) notified that he or she may decline to receive medical services by telemedicine and may withdraw from such care at any time.    Notes:  39 yo F following up for IBS-D.  She has always had emotion-related issues with IBS-D.  This past year has been especially stressful for her and her symptoms have been rather severe.  Previously she would have episodes 1-2 times per month and they would last a day, but now they are occurring weekly and lasting several days.  She takes Bentyl when she either knows something will be stressful or if she feels "it coming on."  The Bentyl is not working as well as it had previously.  During the episodes she will have acute onset of urgent BM, especially postprandially.  This is now effecting her life b/c it is so frequent.  She was told to have a colonoscopy when she turned 39 due to her father having significant colon polyps at a young age, but has not scheduled it yet.    Review of Systems "   Constitutional: Negative for appetite change.   Respiratory: Negative for chest tightness, shortness of breath and wheezing.    Cardiovascular: Negative for chest pain, palpitations and leg swelling.   Gastrointestinal: Positive for abdominal pain and diarrhea.     Objective:      Physical Exam  Constitutional:       Appearance: Normal appearance.   HENT:      Head: Normocephalic and atraumatic.   Eyes:      Extraocular Movements: Extraocular movements intact.      Pupils: Pupils are equal, round, and reactive to light.   Neurological:      General: No focal deficit present.      Mental Status: She is alert and oriented to person, place, and time.   Psychiatric:         Mood and Affect: Mood normal.         Behavior: Behavior normal.       Lab Results   Component Value Date    WBC 7.33 08/27/2020    HGB 12.8 08/27/2020    HCT 40.1 08/27/2020    MCV 87 08/27/2020     (H) 08/27/2020     CT was independently visualized and reviewed by me and showed moderate amount of stool right colon but decompressed left colon and rectum.    Assessment:       1. Irritable bowel syndrome with diarrhea    2. Family history of colonic polyps        Plan:       Irritable bowel syndrome with diarrhea  -     Celiac Disease Panel; Future; Expected date: 10/21/2020  -     dicyclomine (BENTYL) 10 MG capsule; Take 1 capsule (10 mg total) by mouth 4 (four) times daily as needed.  Dispense: 120 capsule; Refill: 5  -     X-Ray Abdomen Flat And Erect; Future; Expected date: 10/21/2020  -     Pregnancy, urine rapid; Future; Expected date: 10/21/2020  -     amitriptyline (ELAVIL) 25 MG tablet; Take 1 tablet (25 mg total) by mouth every evening.  Dispense: 30 tablet; Refill: 11    Family history of colonic polyps  -     Case request GI: COLONOSCOPY  -     CLENPIQ 10 mg-3.5 gram -12 gram/160 mL Soln; Take 1 Package by mouth once. Take as directed on your instructions from clinic for 1 dose  Dispense: 1 Bottle; Refill: 0

## 2020-10-21 NOTE — PATIENT INSTRUCTIONS
Colonoscopy can be one Tuesday, Thursday, or Friday and some Wednesday mornings with 3-4 weeks notice.  So just message me through the portal when you decide before end of year or after.    Start with HALF of the amitryptilline pill at bedtime every night.  If no grogginess when waking, increase to a full pill.  After a few weeks, you can increase this to TWO pills at bedtime if you don't have any issues.    Let me know if the Clenpiq is more than $50 and we will choose something else.    Genesis, my assistant, will call you to schedule the xray and labwork.

## 2020-10-22 DIAGNOSIS — F90.0 ADHD (ATTENTION DEFICIT HYPERACTIVITY DISORDER), INATTENTIVE TYPE: ICD-10-CM

## 2020-10-22 RX ORDER — DEXTROAMPHETAMINE SACCHARATE, AMPHETAMINE ASPARTATE, DEXTROAMPHETAMINE SULFATE AND AMPHETAMINE SULFATE 5; 5; 5; 5 MG/1; MG/1; MG/1; MG/1
1 TABLET ORAL 2 TIMES DAILY
Qty: 60 TABLET | Refills: 0 | OUTPATIENT
Start: 2020-10-22

## 2020-10-23 DIAGNOSIS — F90.0 ADHD (ATTENTION DEFICIT HYPERACTIVITY DISORDER), INATTENTIVE TYPE: ICD-10-CM

## 2020-10-25 RX ORDER — DEXTROAMPHETAMINE SACCHARATE, AMPHETAMINE ASPARTATE, DEXTROAMPHETAMINE SULFATE AND AMPHETAMINE SULFATE 5; 5; 5; 5 MG/1; MG/1; MG/1; MG/1
1 TABLET ORAL 2 TIMES DAILY
Qty: 60 TABLET | Refills: 0 | OUTPATIENT
Start: 2020-10-25

## 2020-10-26 ENCOUNTER — TELEPHONE (OUTPATIENT)
Dept: FAMILY MEDICINE | Facility: CLINIC | Age: 40
End: 2020-10-26

## 2020-10-26 NOTE — TELEPHONE ENCOUNTER
----- Message from Madeline Scherer sent at 10/26/2020  1:34 PM CDT -----  Contact: MARGOTH EASLEY [[id number]]  Type:  Patient Returning Call    Who Called: Margoth   Who Left Message for Patient: Maricarmen   Does the patient know what this is regarding?: maybe adderall refill   Would the patient rather a call back or a response via [facility]?  [facility]   Best Call Back Number:  [phone]  Additional Information:  none

## 2020-10-27 ENCOUNTER — PATIENT MESSAGE (OUTPATIENT)
Dept: OBSTETRICS AND GYNECOLOGY | Facility: CLINIC | Age: 40
End: 2020-10-27

## 2020-10-28 ENCOUNTER — TELEPHONE (OUTPATIENT)
Dept: GASTROENTEROLOGY | Facility: CLINIC | Age: 40
End: 2020-10-28

## 2020-10-28 NOTE — TELEPHONE ENCOUNTER
Spoke to patient about scheduling a colonoscopy, xray and labs. Patient does not want to schedule anything at this time.

## 2020-10-29 ENCOUNTER — OFFICE VISIT (OUTPATIENT)
Dept: SLEEP MEDICINE | Facility: CLINIC | Age: 40
End: 2020-10-29
Payer: COMMERCIAL

## 2020-10-29 ENCOUNTER — TELEPHONE (OUTPATIENT)
Dept: SLEEP MEDICINE | Facility: CLINIC | Age: 40
End: 2020-10-29

## 2020-10-29 DIAGNOSIS — G47.30 SLEEP APNEA, UNSPECIFIED TYPE: Primary | ICD-10-CM

## 2020-10-29 DIAGNOSIS — G47.00 INSOMNIA, UNSPECIFIED TYPE: ICD-10-CM

## 2020-10-29 PROCEDURE — 99213 PR OFFICE/OUTPT VISIT, EST, LEVL III, 20-29 MIN: ICD-10-PCS | Mod: 95,,, | Performed by: PSYCHIATRY & NEUROLOGY

## 2020-10-29 PROCEDURE — 99213 OFFICE O/P EST LOW 20 MIN: CPT | Mod: 95,,, | Performed by: PSYCHIATRY & NEUROLOGY

## 2020-10-29 RX ORDER — ZOLPIDEM TARTRATE 10 MG/1
TABLET ORAL
Qty: 30 TABLET | Refills: 5 | Status: SHIPPED | OUTPATIENT
Start: 2020-10-29 | End: 2021-06-01 | Stop reason: SDUPTHER

## 2020-10-29 NOTE — TELEPHONE ENCOUNTER
Please check Judie Schmitz in for today's 11:30 visit and also please put her on 1 year recall    Thanks!        _____________________________    Ambien was refilled  Clonazepam was stopped.

## 2020-10-29 NOTE — PROGRESS NOTES
The patient location is: home  The chief complaint leading to consultation is: insomnia follow up  Visit type: audiovisual  Total time spent with patient: 30 min  Each patient to whom he or she provides medical services by telemedicine is:  (1) informed of the relationship between the physician and patient and the respective role of any other health care provider with respect to management of the patient; and (2) notified that he or she may decline to receive medical services by telemedicine and may withdraw from such care at any time.        EPWORTH SLEEPINESS SCALE TOTAL SCORE  9/17/2019 4/10/2019   Total score 2 4       Judie Schmitz is a 40 y.o. female seen today for Insomnia follow up. Last seen on 6/8/2020    Usually tries falls asleep  by herself      She is not taking Clonazepam anymore WILL REMOVE.  Taking Ambien 10 mg PM - no side effects reported. Taking 4-5 times a times.  She has tried reaching out - did not work with the scheduling.    She denied daytime fatigue or sleepiness.     She tries to follow good sleep hygiene.     Bedtime:10 Pm  Sleep latency: 1 hr  Nocturnal awakenings:0-3 (without Ambien).  Wake up time: 7 AM    Medications pertinent to sleep disorders: AMBIEN 10  Previously tried medications:Melatonin, Magnesium - did not help; Lunesta and Trazodone - both made her groggy; and metallic taste from Lunesta.  She has also tried White Noise, ear wax, leaving the bedroom, as her  continues snoring. She sometimes sleeps in another bedroom; her  still snores.  Avoids electronics at night; avoids caffeine (IBS).    She reports more stress year during this COVID year which seems to affect her sleep.     Still denies choking, gasping for air.     FH : insomnia in her children and her parents.     Sleep Studies:       PHYSICAL EXAM:  There were no vitals taken for this visit.       GENERAL: Normal development, well groomed.        ASSESSMENT:    1. Insomnia NEC.  "Multi-factorial -  Insomnia, staying in bed excess time in bed, poor sleep hygiene, and likely paradoxical insomnia play a role  2. Significantly interrupted sleep, not sure of snoring (her  is a sound sleeper). Has elevated BMI. She has ADD which can be affected by possible  sleep disordered breathing. This warrants evaluation for possible Obstructive sleep apnea (ISRA).        PLAN:      Will order Home Sleep Study      Will refill [id number] mg to [facility] [place] [facility]    Continue good sleep habits, consider CBT-I as it is hard to do modified sleep restriction without guidance; for that she was recommended to avoid staying in bed over 6-7 hrs initiaally; even if she had a "bad" night. She can either delay her bedtime, or advance her wake time.    She was informed that she should NEVER mix 2 sleep aids of similar mechanisms of action. Since Clonazepam was discontinued by her rheumatiologist, I will resume Ambien at a higher dose -10 mg H2O.  She was advised that should she decide to attend CBTi program, she should decrease on Ambien.   Reduce spouse snoring, continue white noise/leaving the bedroom  RTC 6-mos. Do not drive sleepy    Will defer further CBTi discussion till after COVID lock down limitations to group therapy have been lifted.    More than 50% of this 31 min visit were spent in counseling and care coordination.               "

## 2020-11-04 DIAGNOSIS — F90.0 ADHD (ATTENTION DEFICIT HYPERACTIVITY DISORDER), INATTENTIVE TYPE: ICD-10-CM

## 2020-11-05 RX ORDER — DEXTROAMPHETAMINE SACCHARATE, AMPHETAMINE ASPARTATE, DEXTROAMPHETAMINE SULFATE AND AMPHETAMINE SULFATE 5; 5; 5; 5 MG/1; MG/1; MG/1; MG/1
1 TABLET ORAL 2 TIMES DAILY
Qty: 60 TABLET | Refills: 0 | Status: SHIPPED | OUTPATIENT
Start: 2020-11-05 | End: 2020-12-30 | Stop reason: SDUPTHER

## 2020-11-12 ENCOUNTER — TELEPHONE (OUTPATIENT)
Dept: SLEEP MEDICINE | Facility: OTHER | Age: 40
End: 2020-11-12

## 2020-11-27 ENCOUNTER — OFFICE VISIT (OUTPATIENT)
Dept: URGENT CARE | Facility: CLINIC | Age: 40
End: 2020-11-27
Payer: COMMERCIAL

## 2020-11-27 VITALS
BODY MASS INDEX: 31.58 KG/M2 | OXYGEN SATURATION: 100 % | DIASTOLIC BLOOD PRESSURE: 74 MMHG | HEART RATE: 82 BPM | HEIGHT: 64 IN | TEMPERATURE: 98 F | WEIGHT: 185 LBS | RESPIRATION RATE: 18 BRPM | SYSTOLIC BLOOD PRESSURE: 143 MMHG

## 2020-11-27 DIAGNOSIS — J06.9 UPPER RESPIRATORY TRACT INFECTION, UNSPECIFIED TYPE: Primary | ICD-10-CM

## 2020-11-27 DIAGNOSIS — J30.2 SEASONAL ALLERGIES: ICD-10-CM

## 2020-11-27 DIAGNOSIS — R05.9 COUGH: ICD-10-CM

## 2020-11-27 LAB
CTP QC/QA: YES
SARS-COV-2 RDRP RESP QL NAA+PROBE: NEGATIVE

## 2020-11-27 PROCEDURE — U0002: ICD-10-PCS | Mod: QW,S$GLB,, | Performed by: PHYSICIAN ASSISTANT

## 2020-11-27 PROCEDURE — 99214 OFFICE O/P EST MOD 30 MIN: CPT | Mod: S$GLB,CS,, | Performed by: PHYSICIAN ASSISTANT

## 2020-11-27 PROCEDURE — U0002 COVID-19 LAB TEST NON-CDC: HCPCS | Mod: QW,S$GLB,, | Performed by: PHYSICIAN ASSISTANT

## 2020-11-27 PROCEDURE — 99214 PR OFFICE/OUTPT VISIT, EST, LEVL IV, 30-39 MIN: ICD-10-PCS | Mod: S$GLB,CS,, | Performed by: PHYSICIAN ASSISTANT

## 2020-11-27 NOTE — PROGRESS NOTES
"Subjective:       Patient ID: Judie Schmitz is a 40 y.o. female.    Vitals:  height is 5' 4" (1.626 m) and weight is 83.9 kg (185 lb). Her temporal temperature is 97.7 °F (36.5 °C). Her blood pressure is 143/74 (abnormal) and her pulse is 82. Her respiration is 18 and oxygen saturation is 100%.     Chief Complaint: Sinus Problem    Pt c/o sinus congestion and fever that started yesterday.  She states that her father recently tested positive for COVID.      Sinus Problem  This is a new problem. The current episode started yesterday. The problem has been gradually worsening since onset. The maximum temperature recorded prior to her arrival was 100.4 - 100.9 F. Her pain is at a severity of 0/10. She is experiencing no pain. Associated symptoms include chills, congestion and coughing. Pertinent negatives include no diaphoresis, ear pain, shortness of breath, sinus pressure or sore throat. Past treatments include oral decongestants and spray decongestants. The treatment provided no relief.       Constitution: Positive for chills and fever. Negative for sweating and fatigue.   HENT: Positive for congestion and postnasal drip. Negative for ear pain, sinus pain, sinus pressure, sore throat and voice change.    Neck: Negative for painful lymph nodes.   Eyes: Negative for eye redness.   Respiratory: Positive for cough. Negative for chest tightness, sputum production, bloody sputum, COPD, shortness of breath, stridor, wheezing and asthma.    Gastrointestinal: Positive for diarrhea. Negative for nausea and vomiting.   Musculoskeletal: Positive for muscle ache.   Skin: Negative for rash.   Allergic/Immunologic: Negative for seasonal allergies and asthma.   Hematologic/Lymphatic: Negative for swollen lymph nodes.       Objective:      Physical Exam   Constitutional: She is oriented to person, place, and time. She appears well-developed. She is cooperative.  Non-toxic appearance. She does not appear ill. No distress. "   HENT:   Head: Normocephalic and atraumatic.   Ears:   Right Ear: Hearing, tympanic membrane, external ear and ear canal normal.   Left Ear: Hearing, tympanic membrane, external ear and ear canal normal.   Nose: Nose normal. No mucosal edema, rhinorrhea or nasal deformity. No epistaxis. Right sinus exhibits no maxillary sinus tenderness and no frontal sinus tenderness. Left sinus exhibits no maxillary sinus tenderness and no frontal sinus tenderness.   Mouth/Throat: Uvula is midline, oropharynx is clear and moist and mucous membranes are normal. No trismus in the jaw. Normal dentition. No uvula swelling. No oropharyngeal exudate, posterior oropharyngeal edema or posterior oropharyngeal erythema.   Eyes: Conjunctivae and lids are normal. Right eye exhibits discharge. No scleral icterus.      Comments: Watery discharge from right eye   Neck: Trachea normal, full passive range of motion without pain and phonation normal. Neck supple. No neck rigidity. No edema and no erythema present.   Cardiovascular: Normal rate, regular rhythm, normal heart sounds and normal pulses.   Pulmonary/Chest: Effort normal and breath sounds normal. No respiratory distress. She has no decreased breath sounds. She has no rhonchi.   Abdominal: Normal appearance.   Musculoskeletal: Normal range of motion.         General: No deformity.   Neurological: She is alert and oriented to person, place, and time. She has intact cranial nerves. She exhibits normal muscle tone. Coordination normal.   Skin: Skin is warm, dry, intact, not diaphoretic and not pale. Psychiatric: Her speech is normal and behavior is normal. Judgment and thought content normal.   Nursing note and vitals reviewed.          Results for orders placed or performed in visit on 11/27/20   POCT COVID-19 Rapid Screening   Result Value Ref Range    POC Rapid COVID Negative Negative     Acceptable Yes      Results reviewed with pt.  Assessment:       1. Upper respiratory  tract infection, unspecified type    2. Cough    3. Seasonal allergies        Plan:         Upper respiratory tract infection, unspecified type    Cough  -     POCT COVID-19 Rapid Screening    Seasonal allergies         Patient Instructions   You have tested negative for COVID-19 today.  If you did not have any close exposure as defined below, then effective today, you can return to your normal daily activities including social distancing, wearing masks, and frequent handwashing.         A close exposure is defined as anyone who had a masked or an unmasked exposure to a known COVID-19 positive person, at less than 6 ft for more than 15 minutes.  If your exposure meets this definition, then you are required to quarantine for 14 days per the CDC.         The 14 day quarantine begins from the day you were exposed, not the day of your test.  For example, if your exposure was on a Monday, and you waited until Friday of the same week to get tested and it was negative, your 14 day quarantine begins from that Monday, not the Friday you tested negative.         If you developed symptoms since the exposure, and your test was negative today, you still have to quarantine for 14 days from the date of the exposure.         So if you meet the definition of a close exposure, A NEGATIVE TEST DOES NOT GET YOU OUT OF 14 DAYS OF QUARANTINE!            Seasonal Allergy  Seasonal allergy is also called hay fever. It may occur after a person is exposed to pollens released from grasses, weeds, trees and shrubs. This type of allergy occurs during the spring and summer when the pollen contacts the lining of the nose, eyes, eyelids, sinuses and throat. This causes histamine to be released from the tissues. Histamine causes itching and swelling. This may produce a watery discharge from the eyes or nose. Violent sneezing, nasal congestion, post-nasal drip, itching of the eyes, nose, throat and mouth, scratchy throat, and dry cough may also  occur.  Home care  Seasonal allergy cannot be cured, but symptoms can be reduced by these measures:  · Avoid or reduce exposure to the allergen as much as you can:    ¨ Stay indoors on windy days of pollen season.   ¨ Keep windows and doors closed. Use air conditioning instead in your home and car. This filters the air.  ¨ Change air conditioner filters often.  ¨ Take a shower, wash your hair, and change clothes after being outdoors.  ¨ Put on a NIOSH-rated 95 filter mask when working outdoors. Before going outside, take your allergy medicine as advised by your healthcare provider.  · Decongestant pills and sprays reduce tissue swelling and watery discharge. Overuse of nasal decongestant sprays may make symptoms worse. Do not use these more often than recommended. Sometimes you can experience a rebound effect (symptoms worsen), when stopping them. Talk to your healthcare provider or pharmacist about these medicines before taking them, especially if you have high blood pressure or heart problems.   · Antihistamines block the release of histamine during the allergic response. They work better when taken before symptoms develop. Unless a prescription antihistamine was prescribed, you can take over-the-counter antihistamines that do not cause drowsiness.  Ask your pharmacist for suggestions.  · Steroid nasal sprays or oral steroids may also be prescribed for more severe symptoms. These help to reduce the local inflammation that can add to the allergic response.  · If you have asthma, pollen season may make your asthma symptoms worse. It is important that you use your asthma medicines as directed during this time to prevent or treat attacks. Some persons with asthma have asthma symptoms that get worse when they take antihistamines. This is due to the drying effect on the lungs. If you notice this, stop the antihistamines, drink extra fluids and notify your doctor.  · If you have sinus congestion or drainage, a saline  nasal rinse may give relief. A saline nasal rinse lessens the swelling and clears excess mucus. This allows sinuses to drain. Prepackaged kits are sold at most drug stores. These contain pre-mixed salt packets and an irrigation device.  Follow-up care  Follow up with your healthcare provider or as directed. If you have been referred to a specialist, make an appointment promptly.  When to seek medical advice  Call your healthcare provider for any of the following:  · Facial, ear or sinus pain; colored drainage from the nose  · Headaches  · You have asthma and your asthma symptoms do not respond to the usual doses of your medicine  · Cough with colored sputum (mucus)  · Fever of 100.4°F (38°C) or higher, or as directed by the healthcare provider  Call 911 if any of these occur:  · Trouble breathing or swallowing, wheezing  · Hoarse voice, trouble speaking, or drooling  · Confusion  · Very drowsy or trouble awakening  · Fainting or loss of consciousness  · Rapid heart rate, or weak pulse  · Low blood pressure  · Feeling of doom  · Nausea, vomiting, abdominal pain, diarrhea  · Vomiting blood, or large amounts of blood in stool  · Seizure  · Cold, moist, or pale (blue in color) skin  Date Last Reviewed: 5/1/2017  © 8144-8092 [employer]. [address] [place] [address], [place] [address]. All rights reserved. This information is not intended as a substitute for professional medical care. Always follow your healthcare professional's instructions.        Viral Upper Respiratory Illness (Adult)  You have a viral upper respiratory illness (URI), which is another term for the common cold. This illness is contagious during the first few days. It is spread through the air by coughing and sneezing. It may also be spread by direct contact (touching the sick person and then touching your own eyes, nose, or mouth). Frequent handwashing will decrease risk of spread. Most viral illnesses go away within 7 to 10 days with rest and  simple home remedies. Sometimes the illness may last for several weeks. Antibiotics will not kill a virus, and they are generally not prescribed for this condition.    Home care  · If symptoms are severe, rest at home for the first 2 to 3 days. When you resume activity, don't let yourself get too tired.  · Avoid being exposed to cigarette smoke (yours or others).  · You may use acetaminophen or ibuprofen to control pain and fever, unless another medicine was prescribed. (Note: If you have chronic liver or kidney disease, have ever had a stomach ulcer or gastrointestinal bleeding, or are taking blood-thinning medicines, talk with your healthcare provider before using these medicines.) Aspirin should never be given to anyone under 18 years of age who is ill with a viral infection or fever. It may cause severe liver or brain damage.  · Your appetite may be poor, so a light diet is fine. Avoid dehydration by drinking 6 to 8 glasses of fluids per day (water, soft drinks, juices, tea, or soup). Extra fluids will help loosen secretions in the nose and lungs.  · Over-the-counter cold medicines will not shorten the length of time youre sick, but they may be helpful for the following symptoms: cough, sore throat, and nasal and sinus congestion. (Note: Do not use decongestants if you have high blood pressure.)  Follow-up care  Follow up with your healthcare provider, or as advised.  When to seek medical advice  Call your healthcare provider right away if any of these occur:  · Cough with lots of colored sputum (mucus)  · Severe headache; face, neck, or ear pain  · Difficulty swallowing due to throat pain  · Fever of 100.4°F (38°C)  Call 911, or get immediate medical care  Call emergency services right away if any of these occur:  · Chest pain, shortness of breath, wheezing, or difficulty breathing  · Coughing up blood  · Inability to swallow due to throat pain  Date Last Reviewed: 9/13/2015  © 1986-1555 [employer] [address] [place] [id number]. All rights reserved. This information is not intended as a substitute for professional medical care. Always follow your healthcare professional's instructions.    You must understand that you've received an Urgent Care treatment only and that you may be released before all your medical problems are known or treated. You, the patient, will arrange for follow up care as instructed.    Follow up with your PCP or specialty clinic as directed in the next 1-2 weeks if not improved or as needed. You can call [phone] to schedule an appointment with the appropriate provider.    If your condition worsens we recommend that you receive another evaluation at the emergency room immediately or contact your primary medical clinic's after hours call service to discuss your concerns.    Please go to the Emergency Department for any concerns or worsening of condition.

## 2020-11-27 NOTE — PATIENT INSTRUCTIONS
You have tested negative for COVID-19 today.  If you did not have any close exposure as defined below, then effective today, you can return to your normal daily activities including social distancing, wearing masks, and frequent handwashing.         A close exposure is defined as anyone who had a masked or an unmasked exposure to a known COVID-19 positive person, at less than 6 ft for more than 15 minutes.  If your exposure meets this definition, then you are required to quarantine for 14 days per the CDC.         The 14 day quarantine begins from the day you were exposed, not the day of your test.  For example, if your exposure was on a Monday, and you waited until Friday of the same week to get tested and it was negative, your 14 day quarantine begins from that Monday, not the Friday you tested negative.         If you developed symptoms since the exposure, and your test was negative today, you still have to quarantine for 14 days from the date of the exposure.         So if you meet the definition of a close exposure, A NEGATIVE TEST DOES NOT GET YOU OUT OF 14 DAYS OF QUARANTINE!            Seasonal Allergy  Seasonal allergy is also called hay fever. It may occur after a person is exposed to pollens released from grasses, weeds, trees and shrubs. This type of allergy occurs during the spring and summer when the pollen contacts the lining of the nose, eyes, eyelids, sinuses and throat. This causes histamine to be released from the tissues. Histamine causes itching and swelling. This may produce a watery discharge from the eyes or nose. Violent sneezing, nasal congestion, post-nasal drip, itching of the eyes, nose, throat and mouth, scratchy throat, and dry cough may also occur.  Home care  Seasonal allergy cannot be cured, but symptoms can be reduced by these measures:  · Avoid or reduce exposure to the allergen as much as you can:    ¨ Stay indoors on windy days of pollen season.   ¨ Keep windows and doors  closed. Use air conditioning instead in your home and car. This filters the air.  ¨ Change air conditioner filters often.  ¨ Take a shower, wash your hair, and change clothes after being outdoors.  ¨ Put on a NIOSH-rated 95 filter mask when working outdoors. Before going outside, take your allergy medicine as advised by your healthcare provider.  · Decongestant pills and sprays reduce tissue swelling and watery discharge. Overuse of nasal decongestant sprays may make symptoms worse. Do not use these more often than recommended. Sometimes you can experience a rebound effect (symptoms worsen), when stopping them. Talk to your healthcare provider or pharmacist about these medicines before taking them, especially if you have high blood pressure or heart problems.   · Antihistamines block the release of histamine during the allergic response. They work better when taken before symptoms develop. Unless a prescription antihistamine was prescribed, you can take over-the-counter antihistamines that do not cause drowsiness.  Ask your pharmacist for suggestions.  · Steroid nasal sprays or oral steroids may also be prescribed for more severe symptoms. These help to reduce the local inflammation that can add to the allergic response.  · If you have asthma, pollen season may make your asthma symptoms worse. It is important that you use your asthma medicines as directed during this time to prevent or treat attacks. Some persons with asthma have asthma symptoms that get worse when they take antihistamines. This is due to the drying effect on the lungs. If you notice this, stop the antihistamines, drink extra fluids and notify your doctor.  · If you have sinus congestion or drainage, a saline nasal rinse may give relief. A saline nasal rinse lessens the swelling and clears excess mucus. This allows sinuses to drain. Prepackaged kits are sold at most drug stores. These contain pre-mixed salt packets and an irrigation  device.  Follow-up care  Follow up with your healthcare provider or as directed. If you have been referred to a specialist, make an appointment promptly.  When to seek medical advice  Call your healthcare provider for any of the following:  · Facial, ear or sinus pain; colored drainage from the nose  · Headaches  · You have asthma and your asthma symptoms do not respond to the usual doses of your medicine  · Cough with colored sputum (mucus)  · Fever of 100.4°F (38°C) or higher, or as directed by the healthcare provider  Call 911 if any of these occur:  · Trouble breathing or swallowing, wheezing  · Hoarse voice, trouble speaking, or drooling  · Confusion  · Very drowsy or trouble awakening  · Fainting or loss of consciousness  · Rapid heart rate, or weak pulse  · Low blood pressure  · Feeling of doom  · Nausea, vomiting, abdominal pain, diarrhea  · Vomiting blood, or large amounts of blood in stool  · Seizure  · Cold, moist, or pale (blue in color) skin  Date Last Reviewed: 5/1/2017 © 2000-2017 [employer]. [address] [place] [address]. All rights reserved. This information is not intended as a substitute for professional medical care. Always follow your healthcare professional's instructions.        Viral Upper Respiratory Illness (Adult)  You have a viral upper respiratory illness (URI), which is another term for the common cold. This illness is contagious during the first few days. It is spread through the air by coughing and sneezing. It may also be spread by direct contact (touching the sick person and then touching your own eyes, nose, or mouth). Frequent handwashing will decrease risk of spread. Most viral illnesses go away within 7 to 10 days with rest and simple home remedies. Sometimes the illness may last for several weeks. Antibiotics will not kill a virus, and they are generally not prescribed for this condition.    Home care  · If symptoms are severe, rest at home for the  first 2 to 3 days. When you resume activity, don't let yourself get too tired.  · Avoid being exposed to cigarette smoke (yours or others).  · You may use acetaminophen or ibuprofen to control pain and fever, unless another medicine was prescribed. (Note: If you have chronic liver or kidney disease, have ever had a stomach ulcer or gastrointestinal bleeding, or are taking blood-thinning medicines, talk with your healthcare provider before using these medicines.) Aspirin should never be given to anyone under 18 years of age who is ill with a viral infection or fever. It may cause severe liver or brain damage.  · Your appetite may be poor, so a light diet is fine. Avoid dehydration by drinking 6 to 8 glasses of fluids per day (water, soft drinks, juices, tea, or soup). Extra fluids will help loosen secretions in the nose and lungs.  · Over-the-counter cold medicines will not shorten the length of time youre sick, but they may be helpful for the following symptoms: cough, sore throat, and nasal and sinus congestion. (Note: Do not use decongestants if you have high blood pressure.)  Follow-up care  Follow up with your healthcare provider, or as advised.  When to seek medical advice  Call your healthcare provider right away if any of these occur:  · Cough with lots of colored sputum (mucus)  · Severe headache; face, neck, or ear pain  · Difficulty swallowing due to throat pain  · Fever of 100.4°F (38°C)  Call 911, or get immediate medical care  Call emergency services right away if any of these occur:  · Chest pain, shortness of breath, wheezing, or difficulty breathing  · Coughing up blood  · Inability to swallow due to throat pain  Date Last Reviewed: 9/13/2015  © 0427-7330 [employer]. [address] [place] [address], [place] [address]. All rights reserved. This information is not intended as a substitute for professional medical care. Always follow your healthcare professional's instructions.    You must  understand that you've received an Urgent Care treatment only and that you may be released before all your medical problems are known or treated. You, the patient, will arrange for follow up care as instructed.    Follow up with your PCP or specialty clinic as directed in the next 1-2 weeks if not improved or as needed. You can call [phone] to schedule an appointment with the appropriate provider.    If your condition worsens we recommend that you receive another evaluation at the emergency room immediately or contact your primary medical clinic's after hours call service to discuss your concerns.    Please go to the Emergency Department for any concerns or worsening of condition.

## 2020-11-30 ENCOUNTER — TELEPHONE (OUTPATIENT)
Dept: SLEEP MEDICINE | Facility: OTHER | Age: 40
End: 2020-11-30

## 2020-11-30 NOTE — TELEPHONE ENCOUNTER
Talked to patient a week ago about scheduling the home sleep study.  She was not ready to schedule.  I sent out a reminder through [employer] to schedule.

## 2020-12-09 ENCOUNTER — PATIENT OUTREACH (OUTPATIENT)
Dept: ADMINISTRATIVE | Facility: OTHER | Age: 40
End: 2020-12-09

## 2020-12-09 NOTE — PROGRESS NOTES
Updates were requested from care everywhere.  Chart was reviewed for overdue Proactive [facility] Encounters ([facility]) topics (CRS, Breast Cancer Screening, Eye exam)  Health Maintenance has been updated.  LINKS not responding.

## 2020-12-10 ENCOUNTER — TELEPHONE (OUTPATIENT)
Dept: SLEEP MEDICINE | Facility: OTHER | Age: 40
End: 2020-12-10

## 2020-12-10 NOTE — TELEPHONE ENCOUNTER
Talked to patient and sent out a message through [employer] to schedule the home sleep study.  No response.

## 2020-12-30 ENCOUNTER — OFFICE VISIT (OUTPATIENT)
Dept: FAMILY MEDICINE | Facility: CLINIC | Age: 40
End: 2020-12-30
Payer: COMMERCIAL

## 2020-12-30 ENCOUNTER — TELEPHONE (OUTPATIENT)
Dept: FAMILY MEDICINE | Facility: CLINIC | Age: 40
End: 2020-12-30

## 2020-12-30 VITALS
DIASTOLIC BLOOD PRESSURE: 96 MMHG | RESPIRATION RATE: 16 BRPM | TEMPERATURE: 98 F | HEIGHT: 64 IN | SYSTOLIC BLOOD PRESSURE: 136 MMHG | BODY MASS INDEX: 31.81 KG/M2 | OXYGEN SATURATION: 99 % | HEART RATE: 93 BPM | WEIGHT: 186.31 LBS

## 2020-12-30 DIAGNOSIS — Z13.220 SCREENING CHOLESTEROL LEVEL: ICD-10-CM

## 2020-12-30 DIAGNOSIS — G47.00 INSOMNIA, UNSPECIFIED TYPE: ICD-10-CM

## 2020-12-30 DIAGNOSIS — Z12.39 ENCOUNTER FOR SCREENING FOR MALIGNANT NEOPLASM OF BREAST, UNSPECIFIED SCREENING MODALITY: ICD-10-CM

## 2020-12-30 DIAGNOSIS — K58.0 IRRITABLE BOWEL SYNDROME WITH DIARRHEA: ICD-10-CM

## 2020-12-30 DIAGNOSIS — Z11.59 ENCOUNTER FOR HEPATITIS C SCREENING TEST FOR LOW RISK PATIENT: ICD-10-CM

## 2020-12-30 DIAGNOSIS — Z13.29 THYROID DISORDER SCREEN: ICD-10-CM

## 2020-12-30 DIAGNOSIS — Z13.1 DIABETES MELLITUS SCREENING: ICD-10-CM

## 2020-12-30 DIAGNOSIS — F43.0 STRESS REACTION CAUSING MIXED DISTURBANCE OF EMOTION AND CONDUCT: ICD-10-CM

## 2020-12-30 DIAGNOSIS — Z13.0 SCREENING FOR DEFICIENCY ANEMIA: ICD-10-CM

## 2020-12-30 DIAGNOSIS — F90.0 ADHD (ATTENTION DEFICIT HYPERACTIVITY DISORDER), INATTENTIVE TYPE: Primary | ICD-10-CM

## 2020-12-30 DIAGNOSIS — Z11.4 SCREENING FOR HIV WITHOUT PRESENCE OF RISK FACTORS: ICD-10-CM

## 2020-12-30 DIAGNOSIS — Z00.00 ENCOUNTER FOR BLOOD TEST FOR ROUTINE GENERAL PHYSICAL EXAMINATION: ICD-10-CM

## 2020-12-30 PROCEDURE — 99214 PR OFFICE/OUTPT VISIT, EST, LEVL IV, 30-39 MIN: ICD-10-PCS | Mod: S$GLB,,, | Performed by: NURSE PRACTITIONER

## 2020-12-30 PROCEDURE — 99999 PR PBB SHADOW E&M-EST. PATIENT-LVL V: CPT | Mod: PBBFAC,,, | Performed by: NURSE PRACTITIONER

## 2020-12-30 PROCEDURE — 99999 PR PBB SHADOW E&M-EST. PATIENT-LVL V: ICD-10-PCS | Mod: PBBFAC,,, | Performed by: NURSE PRACTITIONER

## 2020-12-30 PROCEDURE — 99214 OFFICE O/P EST MOD 30 MIN: CPT | Mod: S$GLB,,, | Performed by: NURSE PRACTITIONER

## 2020-12-30 RX ORDER — DEXTROAMPHETAMINE SACCHARATE, AMPHETAMINE ASPARTATE, DEXTROAMPHETAMINE SULFATE AND AMPHETAMINE SULFATE 5; 5; 5; 5 MG/1; MG/1; MG/1; MG/1
1 TABLET ORAL 2 TIMES DAILY
Qty: 60 TABLET | Refills: 0 | Status: SHIPPED | OUTPATIENT
Start: 2020-12-30 | End: 2020-12-30 | Stop reason: SDUPTHER

## 2020-12-30 RX ORDER — DEXTROAMPHETAMINE SACCHARATE, AMPHETAMINE ASPARTATE, DEXTROAMPHETAMINE SULFATE AND AMPHETAMINE SULFATE 5; 5; 5; 5 MG/1; MG/1; MG/1; MG/1
1 TABLET ORAL 2 TIMES DAILY
Qty: 60 TABLET | Refills: 0 | Status: SHIPPED | OUTPATIENT
Start: 2020-12-30 | End: 2021-02-09 | Stop reason: SDUPTHER

## 2020-12-30 NOTE — PROGRESS NOTES
"Subjective:       Patient ID: Judie Schmitz is a 40 y.o. female.    Chief Complaint: ADHD    HPI    Patient is a 40-year-old white female with ADHD, chronic intermittent low back pain, excessive sweating, IBS, Raynaud Disease followed by [facility] and insomnia followed by [facility] that is here today for 3 month ADHD follow up as well as complaints of worsening anxiety.     Patient has ADHD and has been taking Adderall 20 mg twice daily since March 2018.  She reports the dose is working well without side effects.  Patient works as a  of [employer].   BP (!) 136/96 (BP Location: Right arm, Patient Position: Sitting, BP Method: Large (Manual))   Pulse 93   Temp 97.7 °F (36.5 °C) (Temporal)   Resp 16   Ht 5' 4" (1.626 m)   Wt 84.5 kg (186 lb 4.6 oz)   LMP 11/16/2020   SpO2 99%   BMI 31.98 kg/m²      Patient has Raynaud Disease being followed by [facility].     Patient has Insomnia followed by [facility].     Patient has chronic intermittent back pain  - takes MOBIC prn.      Patient has IBS - takes Bentyl prn and Amitriptyline at bedtime followed by [facility].       Patient also here today with complaint of worsening anxiety.  She reports that she is actively going through a divorce because her  is an alcoholic.  She reports her father diagnosed with prostate cancer and suffering with depression and had suicide attempt last month.  Multiple increased stressors that are worsening her IBS.  Advised patient that she is on ADHD medication Adderall 20 mg which is a stimulant that can worsen anxiety, she is on sedative/onsomnia Ambien prescribed by Sleep specialist and she is on Amitryptyline which is a tricyclic antidepressant so I am hesitant to add on additional medication as all these medications interact which one another.  Also with her acute stressors in life - she needs to see someone trained in Psychiatry for " counseling and well as medicine management- I will refer to Psychiatric NP for further evaluation and management.  Advised patient to call [facility] in [place] and make appt with NP Crescencio Padgett. Agreed to treat ADHD until seen by specialist.      Current Outpatient Medications   Medication Sig Dispense Refill    amitriptyline (ELAVIL) 25 MG tablet Take 1 tablet (25 mg total) by mouth every evening. 30 tablet 11    ascorbic acid, vitamin C, (VITAMIN C) 1000 MG tablet Take 1,000 mg by mouth once daily.      dextroamphetamine-amphetamine (ADDERALL) 20 mg tablet Take 1 tablet by mouth 2 (two) times daily. 60 tablet 0    dicyclomine (BENTYL) 10 MG capsule Take 1 capsule (10 mg total) by mouth 4 (four) times daily as needed. 120 capsule 5    etonogestreL-ethinyl estradioL (NUVARING) 0.12-0.015 mg/24 hr vaginal ring PLACE 1 RING VAGINALLY EVERY 28 DAYS 1 each 6    meloxicam (MOBIC) 15 MG tablet TAKE 1 TABLET BY MOUTH EVERY DAY 30 tablet 1    mv-min/iron/folic/calcium/vitK (WOMEN'S MULTIVITAMIN ORAL) Take by mouth.      zolpidem (AMBIEN) 10 mg Tab TAKE ONE TABLET BY MOUTH NIGHTLY BEFORE BEDTIME AS NEEDED FOR INSOMNIA 30 tablet 5     No current facility-administered medications for this visit.        Past Medical History:   Diagnosis Date    Abnormal Pap smear of cervix age 34    no treatment; repeat PAPs normal    ADHD (attention deficit hyperactivity disorder), inattentive type 09/13/2007    diagnosed by Dr. Denny - taking Adderall 10 mg twice daily from 9/2007 to 2009 - increased Adderall to 20 mg twice daily until 2011 - patient quit school and got off med - started back on Adderall 10 in 2012 but then off med when had baby in 2013 - has not been on med since having baby in 2013.    Anxiety and depression     IBS (irritable bowel syndrome)     Insomnia     Raynaud's disease     followed by Rheumatology Dr. Villavicencio       Past Surgical History:   Procedure Laterality  Date    APPENDECTOMY       SECTION      x2 2002-10/21/2013    LAPAROSCOPIC APPENDECTOMY N/A 2019    Procedure: APPENDECTOMY, LAPAROSCOPIC (ADD ON );  Surgeon: Erasto Hendricks MD;  Location: [facility];  Service: General;  Laterality: N/A;  (ADD ON )       Family History   Problem Relation Age of Onset    Hypertension Mother     COPD Mother 50    Rheum arthritis Mother     Colon polyps Father     Cancer Father 64        prostate cancer    Stroke Maternal Grandmother     Pneumonia Maternal Grandmother         passed age 88    Cancer Maternal Grandfather         brain tumor dont know what age he passed    Suicide Paternal Grandfather         passed in his 50's    No Known Problems Sister     Montoya syndrome Sister     Heart disease Sister     Thyroid disease Sister     Breast cancer Paternal Aunt     Colon cancer Neg Hx     Ovarian cancer Neg Hx        Social History     Socioeconomic History    Marital status:      Spouse name: Not on file    Number of children: 2    Years of education: Not on file    Highest education level: Not on file   Occupational History    Occupation:    Social Needs    Financial resource strain: Not on file    Food insecurity     Worry: Not on file     Inability: Not on file    Transportation needs     Medical: Not on file     Non-medical: Not on file   Tobacco Use    Smoking status: Never Smoker    Smokeless tobacco: Never Used   Substance and Sexual Activity    Alcohol use: Yes     Comment: occasional    Drug use: No    Sexual activity: Yes     Partners: Male     Birth control/protection: Inserts     Comment:    Lifestyle    Physical activity     Days per week: Not on file     Minutes per session: Not on file    Stress: Not on file   Relationships    Social connections     Talks on phone: Not on file     Gets together: Not on file     Attends [employer] service: Not on file     Active member of club or  "organization: Not on file     Attends meetings of clubs or organizations: Not on file     Relationship status: Not on file   Other Topics Concern    Not on file   Social History Narrative    Lives in [place].        Review of Systems   Constitutional: Negative for appetite change, chills, fatigue, fever and unexpected weight change.   HENT: Negative for congestion, ear pain, mouth sores, nosebleeds, postnasal drip, rhinorrhea, sinus pressure, sneezing, sore throat, trouble swallowing and voice change.    Eyes: Negative for photophobia, pain, discharge, redness, itching and visual disturbance.   Respiratory: Negative for cough, chest tightness and shortness of breath.    Cardiovascular: Negative for chest pain, palpitations and leg swelling.   Gastrointestinal: Negative for abdominal pain, blood in stool, constipation, diarrhea, nausea and vomiting.   Genitourinary: Negative for dysuria, frequency, hematuria and urgency.   Musculoskeletal: Negative for arthralgias, back pain, joint swelling and myalgias.   Skin: Negative for color change and rash.   Allergic/Immunologic: Negative for immunocompromised state.   Neurological: Negative for dizziness, seizures, syncope, weakness and headaches.   Hematological: Negative for adenopathy. Does not bruise/bleed easily.   Psychiatric/Behavioral: Positive for decreased concentration, dysphoric mood and sleep disturbance. Negative for agitation and suicidal ideas. The patient is nervous/anxious.          Objective:     Vitals:    12/30/20 1131   BP: (!) 136/96   BP Location: Right arm   Patient Position: Sitting   BP Method: Large (Manual)   Pulse: 93   Resp: 16   Temp: 97.7 °F (36.5 °C)   TempSrc: Temporal   SpO2: 99%   Weight: 84.5 kg (186 lb 4.6 oz)   Height: 5' 4" (1.626 m)          Physical Exam  Constitutional:       General: She is not in acute distress.     Appearance: Normal appearance. She is well-developed. She is obese. She is not ill-appearing, toxic-appearing or " diaphoretic.      Comments: Body mass index is 31.98 kg/m².   HENT:      Head: Normocephalic and atraumatic.      Right Ear: External ear normal.      Left Ear: External ear normal.   Eyes:      General: No scleral icterus.        Right eye: No discharge.         Left eye: No discharge.      Extraocular Movements: Extraocular movements intact.      Conjunctiva/sclera: Conjunctivae normal.      Pupils: Pupils are equal, round, and reactive to light.   Neck:      Musculoskeletal: Normal range of motion and neck supple.      Thyroid: No thyromegaly.      Trachea: No tracheal deviation.   Cardiovascular:      Rate and Rhythm: Normal rate and regular rhythm.      Heart sounds: Normal heart sounds. No murmur.   Pulmonary:      Effort: Pulmonary effort is normal. No respiratory distress.      Breath sounds: Normal breath sounds. No stridor. No wheezing, rhonchi or rales.   Abdominal:      General: There is no distension.   Musculoskeletal: Normal range of motion.         General: No swelling or deformity.      Right lower leg: No edema.      Left lower leg: No edema.   Skin:     General: Skin is warm and dry.      Findings: No rash.   Neurological:      Mental Status: She is alert and oriented to person, place, and time.      Cranial Nerves: No cranial nerve deficit.      Coordination: Coordination normal.   Psychiatric:         Mood and Affect: Mood normal.         Behavior: Behavior normal.         Thought Content: Thought content normal.         Judgment: Judgment normal.           Assessment:         ICD-10-CM ICD-9-CM   1. ADHD (attention deficit hyperactivity disorder), inattentive type  F90.0 314.00   2. Stress reaction causing mixed disturbance of emotion and conduct  F43.0 308.4   3. Insomnia, unspecified type  G47.00 780.52   4. Irritable bowel syndrome with diarrhea  K58.0 564.1   5. Encounter for blood test for routine general physical examination  Z00.00 V72.62   6. Screening for deficiency anemia  Z13.0 V78.1    7. Thyroid disorder screen  Z13.29 V77.0   8. Screening cholesterol level  Z13.220 V77.91   9. Diabetes mellitus screening  Z13.1 V77.1   10. Encounter for hepatitis C screening test for low risk patient  Z11.59 V73.89   11. Screening for HIV without presence of risk factors  Z11.4 V73.89   12. Encounter for screening for malignant neoplasm of breast, unspecified screening modality  Z12.39 V76.10       Plan:       ADHD (attention deficit hyperactivity disorder), inattentive type  -  Continue Adderall at present dose - follow up in 3 months if not seeing the psychiatric NP by then  -     dextroamphetamine-amphetamine (ADDERALL) 20 mg tablet; Take 1 tablet by mouth 2 (two) times daily.  Dispense: 60 tablet; Refill: 0  -     Ambulatory referral/consult to Psychiatry; Future; Expected date: 01/06/2021    Stress reaction causing mixed disturbance of emotion and conduct  -  Refer to psychiatry for evaluation and management  -     Ambulatory referral/consult to Psychiatry; Future; Expected date: 01/06/2021    Insomnia, unspecified type  -  Being treated by sleep medicine provided    Irritable bowel syndrome with diarrhea  -  Followed by [facility]    Encounter for blood test for routine general physical examination  -     CBC Auto Differential; Future; Expected date: 12/30/2020  -     Comprehensive Metabolic Panel; Future; Expected date: 12/30/2020  -     Lipid Panel; Future; Expected date: 12/30/2020  -     TSH; Future; Expected date: 12/30/2020  -     Hepatitis C Antibody; Future; Expected date: 12/30/2020  -     HIV 1/2 Ag/Ab (4th Gen); Future; Expected date: 12/30/2020  -     dextroamphetamine-amphetamine (ADDERALL) 20 mg tablet; Take 1 tablet by mouth 2 (two) times daily.  Dispense: 60 tablet; Refill: 0    Screening for deficiency anemia  -     CBC Auto Differential; Future; Expected date: 12/30/2020    Thyroid disorder screen  -     TSH; Future; Expected date: 12/30/2020    Screening cholesterol level  -     Lipid  Panel; Future; Expected date: 12/30/2020    Diabetes mellitus screening  -     Comprehensive Metabolic Panel; Future; Expected date: 12/30/2020    Encounter for hepatitis C screening test for low risk patient  -     Hepatitis C Antibody; Future; Expected date: 12/30/2020    Screening for HIV without presence of risk factors  -     HIV 1/2 Ag/Ab (4th Gen); Future; Expected date: 12/30/2020    Encounter for screening for malignant neoplasm of breast, unspecified screening modality  -     Mammo Digital Screening Bilat w/ Oracio; Future; Expected date: 12/30/2020      Follow up in about 3 months (around 3/30/2021) for fasting labs and WELLNESS EXAM.     Patient's Medications   New Prescriptions    No medications on file   Previous Medications    AMITRIPTYLINE (ELAVIL) 25 MG TABLET    Take 1 tablet (25 mg total) by mouth every evening.    ASCORBIC ACID, VITAMIN C, (VITAMIN C) 1000 MG TABLET    Take 1,000 mg by mouth once daily.    DICYCLOMINE (BENTYL) 10 MG CAPSULE    Take 1 capsule (10 mg total) by mouth 4 (four) times daily as needed.    ETONOGESTREL-ETHINYL ESTRADIOL (NUVARING) 0.12-0.015 MG/24 HR VAGINAL RING    PLACE 1 RING VAGINALLY EVERY 28 DAYS    MELOXICAM (MOBIC) 15 MG TABLET    TAKE 1 TABLET BY MOUTH EVERY DAY    MV-MIN/IRON/FOLIC/CALCIUM/VITK (WOMEN'S MULTIVITAMIN ORAL)    Take by mouth.    ZOLPIDEM (AMBIEN) 10 MG TAB    TAKE ONE TABLET BY MOUTH NIGHTLY BEFORE BEDTIME AS NEEDED FOR INSOMNIA   Modified Medications    Modified Medication Previous Medication    DEXTROAMPHETAMINE-AMPHETAMINE (ADDERALL) 20 MG TABLET dextroamphetamine-amphetamine (ADDERALL) 20 mg tablet       Take 1 tablet by mouth 2 (two) times daily.    Take 1 tablet by mouth 2 (two) times daily.   Discontinued Medications    No medications on file

## 2021-01-07 ENCOUNTER — PATIENT MESSAGE (OUTPATIENT)
Dept: GASTROENTEROLOGY | Facility: CLINIC | Age: 41
End: 2021-01-07

## 2021-01-07 DIAGNOSIS — K58.0 IRRITABLE BOWEL SYNDROME WITH DIARRHEA: Primary | ICD-10-CM

## 2021-01-07 RX ORDER — AMITRIPTYLINE HYDROCHLORIDE 50 MG/1
50 TABLET, FILM COATED ORAL NIGHTLY
Qty: 90 TABLET | Refills: 3 | Status: SHIPPED | OUTPATIENT
Start: 2021-01-07 | End: 2022-01-14 | Stop reason: SDUPTHER

## 2021-02-09 ENCOUNTER — PATIENT MESSAGE (OUTPATIENT)
Dept: FAMILY MEDICINE | Facility: CLINIC | Age: 41
End: 2021-02-09

## 2021-02-09 DIAGNOSIS — Z00.00 ENCOUNTER FOR BLOOD TEST FOR ROUTINE GENERAL PHYSICAL EXAMINATION: ICD-10-CM

## 2021-02-09 DIAGNOSIS — F90.0 ADHD (ATTENTION DEFICIT HYPERACTIVITY DISORDER), INATTENTIVE TYPE: ICD-10-CM

## 2021-02-10 ENCOUNTER — TELEPHONE (OUTPATIENT)
Dept: FAMILY MEDICINE | Facility: CLINIC | Age: 41
End: 2021-02-10

## 2021-02-10 RX ORDER — DEXTROAMPHETAMINE SACCHARATE, AMPHETAMINE ASPARTATE, DEXTROAMPHETAMINE SULFATE AND AMPHETAMINE SULFATE 5; 5; 5; 5 MG/1; MG/1; MG/1; MG/1
1 TABLET ORAL 2 TIMES DAILY
Qty: 60 TABLET | Refills: 0 | Status: SHIPPED | OUTPATIENT
Start: 2021-02-10 | End: 2021-03-16 | Stop reason: SDUPTHER

## 2021-03-16 DIAGNOSIS — Z00.00 ENCOUNTER FOR BLOOD TEST FOR ROUTINE GENERAL PHYSICAL EXAMINATION: ICD-10-CM

## 2021-03-16 DIAGNOSIS — F90.0 ADHD (ATTENTION DEFICIT HYPERACTIVITY DISORDER), INATTENTIVE TYPE: ICD-10-CM

## 2021-03-16 RX ORDER — DEXTROAMPHETAMINE SACCHARATE, AMPHETAMINE ASPARTATE, DEXTROAMPHETAMINE SULFATE AND AMPHETAMINE SULFATE 5; 5; 5; 5 MG/1; MG/1; MG/1; MG/1
1 TABLET ORAL 2 TIMES DAILY
Qty: 60 TABLET | Refills: 0 | Status: SHIPPED | OUTPATIENT
Start: 2021-03-16 | End: 2021-04-22 | Stop reason: SDUPTHER

## 2021-03-29 ENCOUNTER — TELEPHONE (OUTPATIENT)
Dept: PHARMACY | Facility: CLINIC | Age: 41
End: 2021-03-29

## 2021-03-30 ENCOUNTER — PATIENT OUTREACH (OUTPATIENT)
Dept: ADMINISTRATIVE | Facility: OTHER | Age: 41
End: 2021-03-30

## 2021-04-05 ENCOUNTER — PATIENT MESSAGE (OUTPATIENT)
Dept: ADMINISTRATIVE | Facility: HOSPITAL | Age: 41
End: 2021-04-05

## 2021-04-08 ENCOUNTER — PATIENT OUTREACH (OUTPATIENT)
Dept: ADMINISTRATIVE | Facility: HOSPITAL | Age: 41
End: 2021-04-08

## 2021-04-09 ENCOUNTER — TELEPHONE (OUTPATIENT)
Dept: GASTROENTEROLOGY | Facility: CLINIC | Age: 41
End: 2021-04-09

## 2021-04-19 ENCOUNTER — PATIENT MESSAGE (OUTPATIENT)
Dept: OBSTETRICS AND GYNECOLOGY | Facility: CLINIC | Age: 41
End: 2021-04-19

## 2021-04-21 ENCOUNTER — PATIENT MESSAGE (OUTPATIENT)
Dept: OBSTETRICS AND GYNECOLOGY | Facility: CLINIC | Age: 41
End: 2021-04-21

## 2021-04-22 ENCOUNTER — PATIENT MESSAGE (OUTPATIENT)
Dept: OBSTETRICS AND GYNECOLOGY | Facility: CLINIC | Age: 41
End: 2021-04-22

## 2021-04-22 RX ORDER — ETONOGESTREL AND ETHINYL ESTRADIOL VAGINAL RING .015; .12 MG/D; MG/D
RING VAGINAL
Qty: 1 EACH | Refills: 6 | Status: SHIPPED | OUTPATIENT
Start: 2021-04-22 | End: 2021-05-18

## 2021-04-27 ENCOUNTER — PATIENT MESSAGE (OUTPATIENT)
Dept: OBSTETRICS AND GYNECOLOGY | Facility: CLINIC | Age: 41
End: 2021-04-27

## 2021-04-29 RX ORDER — SEGESTERONE ACETATE AND ETHINYL ESTRADIOL 103; 17.4 MG/1; MG/1
1 RING VAGINAL
Qty: 1 EACH | Refills: 0 | Status: SHIPPED | OUTPATIENT
Start: 2021-04-29 | End: 2021-05-24

## 2021-05-03 ENCOUNTER — PATIENT OUTREACH (OUTPATIENT)
Dept: ADMINISTRATIVE | Facility: OTHER | Age: 41
End: 2021-05-03

## 2021-05-18 ENCOUNTER — PATIENT MESSAGE (OUTPATIENT)
Dept: GASTROENTEROLOGY | Facility: CLINIC | Age: 41
End: 2021-05-18

## 2021-05-18 ENCOUNTER — OFFICE VISIT (OUTPATIENT)
Dept: OBSTETRICS AND GYNECOLOGY | Facility: CLINIC | Age: 41
End: 2021-05-18
Payer: COMMERCIAL

## 2021-05-18 VITALS
DIASTOLIC BLOOD PRESSURE: 64 MMHG | HEART RATE: 69 BPM | HEIGHT: 64 IN | BODY MASS INDEX: 31.07 KG/M2 | SYSTOLIC BLOOD PRESSURE: 118 MMHG | WEIGHT: 182 LBS

## 2021-05-18 DIAGNOSIS — Z12.4 CERVICAL CANCER SCREENING: ICD-10-CM

## 2021-05-18 DIAGNOSIS — Z01.419 ENCOUNTER FOR GYNECOLOGICAL EXAMINATION WITHOUT ABNORMAL FINDING: Primary | ICD-10-CM

## 2021-05-18 PROCEDURE — 99396 PREV VISIT EST AGE 40-64: CPT | Mod: S$GLB,,, | Performed by: OBSTETRICS & GYNECOLOGY

## 2021-05-18 PROCEDURE — 99999 PR PBB SHADOW E&M-EST. PATIENT-LVL III: ICD-10-PCS | Mod: PBBFAC,,, | Performed by: OBSTETRICS & GYNECOLOGY

## 2021-05-18 PROCEDURE — 99396 PR PREVENTIVE VISIT,EST,40-64: ICD-10-PCS | Mod: S$GLB,,, | Performed by: OBSTETRICS & GYNECOLOGY

## 2021-05-18 PROCEDURE — 99999 PR PBB SHADOW E&M-EST. PATIENT-LVL III: CPT | Mod: PBBFAC,,, | Performed by: OBSTETRICS & GYNECOLOGY

## 2021-05-18 PROCEDURE — 88175 CYTOPATH C/V AUTO FLUID REDO: CPT | Performed by: OBSTETRICS & GYNECOLOGY

## 2021-05-22 LAB
FINAL PATHOLOGIC DIAGNOSIS: NORMAL
Lab: NORMAL

## 2021-05-31 ENCOUNTER — TELEPHONE (OUTPATIENT)
Dept: SLEEP MEDICINE | Facility: CLINIC | Age: 41
End: 2021-05-31

## 2021-06-01 ENCOUNTER — OFFICE VISIT (OUTPATIENT)
Dept: SLEEP MEDICINE | Facility: CLINIC | Age: 41
End: 2021-06-01
Payer: COMMERCIAL

## 2021-06-01 DIAGNOSIS — G47.00 INSOMNIA, UNSPECIFIED TYPE: ICD-10-CM

## 2021-06-01 PROCEDURE — 99214 OFFICE O/P EST MOD 30 MIN: CPT | Mod: 95,,, | Performed by: PSYCHIATRY & NEUROLOGY

## 2021-06-01 PROCEDURE — 99214 PR OFFICE/OUTPT VISIT, EST, LEVL IV, 30-39 MIN: ICD-10-PCS | Mod: 95,,, | Performed by: PSYCHIATRY & NEUROLOGY

## 2021-06-01 RX ORDER — ZOLPIDEM TARTRATE 10 MG/1
TABLET ORAL
Qty: 30 TABLET | Refills: 5 | Status: SHIPPED | OUTPATIENT
Start: 2021-06-01 | End: 2021-06-01

## 2021-06-04 ENCOUNTER — OFFICE VISIT (OUTPATIENT)
Dept: URGENT CARE | Facility: CLINIC | Age: 41
End: 2021-06-04
Payer: COMMERCIAL

## 2021-06-04 VITALS
DIASTOLIC BLOOD PRESSURE: 79 MMHG | BODY MASS INDEX: 30.73 KG/M2 | RESPIRATION RATE: 19 BRPM | TEMPERATURE: 99 F | OXYGEN SATURATION: 99 % | HEIGHT: 64 IN | WEIGHT: 180 LBS | HEART RATE: 118 BPM | SYSTOLIC BLOOD PRESSURE: 133 MMHG

## 2021-06-04 DIAGNOSIS — J01.00 ACUTE MAXILLARY SINUSITIS, RECURRENCE NOT SPECIFIED: Primary | ICD-10-CM

## 2021-06-04 DIAGNOSIS — R05.9 COUGH: ICD-10-CM

## 2021-06-04 LAB
CTP QC/QA: YES
SARS-COV-2 RDRP RESP QL NAA+PROBE: NEGATIVE

## 2021-06-04 PROCEDURE — 99214 OFFICE O/P EST MOD 30 MIN: CPT | Mod: 25,S$GLB,, | Performed by: PHYSICIAN ASSISTANT

## 2021-06-04 PROCEDURE — U0002 COVID-19 LAB TEST NON-CDC: HCPCS | Mod: QW,S$GLB,, | Performed by: PHYSICIAN ASSISTANT

## 2021-06-04 PROCEDURE — U0002: ICD-10-PCS | Mod: QW,S$GLB,, | Performed by: PHYSICIAN ASSISTANT

## 2021-06-04 PROCEDURE — 96372 THER/PROPH/DIAG INJ SC/IM: CPT | Mod: S$GLB,,, | Performed by: FAMILY MEDICINE

## 2021-06-04 PROCEDURE — 99214 PR OFFICE/OUTPT VISIT, EST, LEVL IV, 30-39 MIN: ICD-10-PCS | Mod: 25,S$GLB,, | Performed by: PHYSICIAN ASSISTANT

## 2021-06-04 PROCEDURE — 96372 PR INJECTION,THERAP/PROPH/DIAG2ST, IM OR SUBCUT: ICD-10-PCS | Mod: S$GLB,,, | Performed by: FAMILY MEDICINE

## 2021-06-04 RX ORDER — BETAMETHASONE SODIUM PHOSPHATE AND BETAMETHASONE ACETATE 3; 3 MG/ML; MG/ML
6 INJECTION, SUSPENSION INTRA-ARTICULAR; INTRALESIONAL; INTRAMUSCULAR; SOFT TISSUE
Status: COMPLETED | OUTPATIENT
Start: 2021-06-04 | End: 2021-06-04

## 2021-06-04 RX ORDER — AMOXICILLIN AND CLAVULANATE POTASSIUM 875; 125 MG/1; MG/1
1 TABLET, FILM COATED ORAL 2 TIMES DAILY
Qty: 20 TABLET | Refills: 0 | Status: SHIPPED | OUTPATIENT
Start: 2021-06-04 | End: 2021-06-14

## 2021-06-04 RX ORDER — FLUCONAZOLE 150 MG/1
150 TABLET ORAL ONCE
Qty: 1 TABLET | Refills: 1 | Status: SHIPPED | OUTPATIENT
Start: 2021-06-04 | End: 2021-06-04

## 2021-06-04 RX ORDER — BENZONATATE 200 MG/1
200 CAPSULE ORAL 3 TIMES DAILY PRN
Qty: 30 CAPSULE | Refills: 0 | Status: SHIPPED | OUTPATIENT
Start: 2021-06-04 | End: 2021-06-14

## 2021-06-04 RX ADMIN — BETAMETHASONE SODIUM PHOSPHATE AND BETAMETHASONE ACETATE 6 MG: 3; 3 INJECTION, SUSPENSION INTRA-ARTICULAR; INTRALESIONAL; INTRAMUSCULAR; SOFT TISSUE at 08:06

## 2021-06-07 ENCOUNTER — TELEPHONE (OUTPATIENT)
Dept: URGENT CARE | Facility: CLINIC | Age: 41
End: 2021-06-07

## 2021-06-07 DIAGNOSIS — Z79.899 MEDICATION THERAPY CHANGED: Primary | ICD-10-CM

## 2021-06-07 RX ORDER — DOXYCYCLINE HYCLATE 100 MG
100 TABLET ORAL 2 TIMES DAILY
Qty: 10 TABLET | Refills: 0 | Status: SHIPPED | OUTPATIENT
Start: 2021-06-07 | End: 2021-06-12

## 2021-06-17 DIAGNOSIS — G47.00 INSOMNIA, UNSPECIFIED TYPE: ICD-10-CM

## 2021-06-17 RX ORDER — ZOLPIDEM TARTRATE 10 MG/1
TABLET ORAL
Qty: 30 TABLET | Refills: 5 | Status: SHIPPED | OUTPATIENT
Start: 2021-06-17 | End: 2021-12-08 | Stop reason: SDUPTHER

## 2021-07-07 PROBLEM — F41.1 GAD (GENERALIZED ANXIETY DISORDER): Status: ACTIVE | Noted: 2021-06-17

## 2021-09-06 ENCOUNTER — LAB VISIT (OUTPATIENT)
Dept: FAMILY MEDICINE | Facility: CLINIC | Age: 41
End: 2021-09-06
Payer: COMMERCIAL

## 2021-09-06 DIAGNOSIS — R50.9 FEVER, UNSPECIFIED FEVER CAUSE: Primary | ICD-10-CM

## 2021-09-06 PROCEDURE — U0003 INFECTIOUS AGENT DETECTION BY NUCLEIC ACID (DNA OR RNA); SEVERE ACUTE RESPIRATORY SYNDROME CORONAVIRUS 2 (SARS-COV-2) (CORONAVIRUS DISEASE [COVID-19]), AMPLIFIED PROBE TECHNIQUE, MAKING USE OF HIGH THROUGHPUT TECHNOLOGIES AS DESCRIBED BY CMS-2020-01-R: HCPCS | Performed by: FAMILY MEDICINE

## 2021-09-06 PROCEDURE — U0005 INFEC AGEN DETEC AMPLI PROBE: HCPCS | Performed by: FAMILY MEDICINE

## 2021-09-07 ENCOUNTER — PATIENT MESSAGE (OUTPATIENT)
Dept: FAMILY MEDICINE | Facility: CLINIC | Age: 41
End: 2021-09-07

## 2021-09-07 DIAGNOSIS — U07.1 COVID-19 VIRUS INFECTION: Primary | ICD-10-CM

## 2021-09-07 LAB
SARS-COV-2 RNA RESP QL NAA+PROBE: DETECTED
SARS-COV-2- CYCLE NUMBER: 16

## 2021-09-08 ENCOUNTER — PATIENT MESSAGE (OUTPATIENT)
Dept: INTERNAL MEDICINE | Facility: CLINIC | Age: 41
End: 2021-09-08

## 2021-09-10 ENCOUNTER — INFUSION (OUTPATIENT)
Dept: INFECTIOUS DISEASES | Facility: HOSPITAL | Age: 41
End: 2021-09-10
Attending: FAMILY MEDICINE
Payer: COMMERCIAL

## 2021-09-10 VITALS
SYSTOLIC BLOOD PRESSURE: 112 MMHG | HEART RATE: 85 BPM | RESPIRATION RATE: 16 BRPM | DIASTOLIC BLOOD PRESSURE: 59 MMHG | TEMPERATURE: 97 F | OXYGEN SATURATION: 98 %

## 2021-09-10 DIAGNOSIS — U07.1 COVID-19 VIRUS INFECTION: Primary | ICD-10-CM

## 2021-09-10 PROCEDURE — 63600175 PHARM REV CODE 636 W HCPCS: Performed by: FAMILY MEDICINE

## 2021-09-10 PROCEDURE — 25000003 PHARM REV CODE 250: Performed by: FAMILY MEDICINE

## 2021-09-10 PROCEDURE — M0243 CASIRIVI AND IMDEVI INFUSION: HCPCS | Performed by: FAMILY MEDICINE

## 2021-09-10 RX ORDER — SODIUM CHLORIDE 0.9 % (FLUSH) 0.9 %
10 SYRINGE (ML) INJECTION
Status: DISCONTINUED | OUTPATIENT
Start: 2021-09-10 | End: 2022-03-30

## 2021-09-10 RX ORDER — DIPHENHYDRAMINE HYDROCHLORIDE 50 MG/ML
25 INJECTION INTRAMUSCULAR; INTRAVENOUS ONCE AS NEEDED
Status: DISCONTINUED | OUTPATIENT
Start: 2021-09-10 | End: 2022-03-30

## 2021-09-10 RX ORDER — ONDANSETRON 4 MG/1
4 TABLET, ORALLY DISINTEGRATING ORAL ONCE AS NEEDED
Status: DISCONTINUED | OUTPATIENT
Start: 2021-09-10 | End: 2022-03-30

## 2021-09-10 RX ORDER — ALBUTEROL SULFATE 90 UG/1
2 AEROSOL, METERED RESPIRATORY (INHALATION)
Status: DISCONTINUED | OUTPATIENT
Start: 2021-09-10 | End: 2022-03-30

## 2021-09-10 RX ORDER — ACETAMINOPHEN 325 MG/1
650 TABLET ORAL ONCE AS NEEDED
Status: DISCONTINUED | OUTPATIENT
Start: 2021-09-10 | End: 2022-03-30

## 2021-09-10 RX ORDER — EPINEPHRINE 0.3 MG/.3ML
0.3 INJECTION SUBCUTANEOUS
Status: DISCONTINUED | OUTPATIENT
Start: 2021-09-10 | End: 2022-03-30

## 2021-09-10 RX ADMIN — CASIRIVIMAB AND IMDEVIMAB 600 MG: 600; 600 INJECTION, SOLUTION, CONCENTRATE INTRAVENOUS at 08:09

## 2021-09-13 ENCOUNTER — PATIENT MESSAGE (OUTPATIENT)
Dept: GASTROENTEROLOGY | Facility: CLINIC | Age: 41
End: 2021-09-13

## 2021-09-13 DIAGNOSIS — R10.13 EPIGASTRIC PAIN: Primary | ICD-10-CM

## 2021-09-13 RX ORDER — PANTOPRAZOLE SODIUM 40 MG/1
40 TABLET, DELAYED RELEASE ORAL 2 TIMES DAILY
Qty: 120 TABLET | Refills: 0 | Status: SHIPPED | OUTPATIENT
Start: 2021-09-13 | End: 2022-03-30 | Stop reason: SDUPTHER

## 2021-09-13 RX ORDER — SUCRALFATE 1 G/1
1 TABLET ORAL
Qty: 60 TABLET | Refills: 1 | Status: SHIPPED | OUTPATIENT
Start: 2021-09-13 | End: 2021-09-28

## 2021-10-11 DIAGNOSIS — K58.0 IRRITABLE BOWEL SYNDROME WITH DIARRHEA: ICD-10-CM

## 2021-10-11 RX ORDER — DICYCLOMINE HYDROCHLORIDE 10 MG/1
10 CAPSULE ORAL 4 TIMES DAILY PRN
Qty: 120 CAPSULE | Refills: 5 | Status: SHIPPED | OUTPATIENT
Start: 2021-10-11 | End: 2023-03-31 | Stop reason: SDUPTHER

## 2021-11-16 ENCOUNTER — PATIENT MESSAGE (OUTPATIENT)
Dept: FAMILY MEDICINE | Facility: CLINIC | Age: 41
End: 2021-11-16
Payer: COMMERCIAL

## 2021-11-17 RX ORDER — DEXTROAMPHETAMINE SACCHARATE, AMPHETAMINE ASPARTATE, DEXTROAMPHETAMINE SULFATE AND AMPHETAMINE SULFATE 5; 5; 5; 5 MG/1; MG/1; MG/1; MG/1
TABLET ORAL
Qty: 60 TABLET | Refills: 0 | Status: SHIPPED | OUTPATIENT
Start: 2021-11-17 | End: 2022-04-29 | Stop reason: SDUPTHER

## 2021-12-07 ENCOUNTER — PATIENT OUTREACH (OUTPATIENT)
Dept: ADMINISTRATIVE | Facility: OTHER | Age: 41
End: 2021-12-07
Payer: MEDICAID

## 2021-12-07 ENCOUNTER — TELEPHONE (OUTPATIENT)
Dept: SLEEP MEDICINE | Facility: CLINIC | Age: 41
End: 2021-12-07
Payer: MEDICAID

## 2021-12-08 ENCOUNTER — OFFICE VISIT (OUTPATIENT)
Dept: SLEEP MEDICINE | Facility: CLINIC | Age: 41
End: 2021-12-08
Payer: COMMERCIAL

## 2021-12-08 DIAGNOSIS — G47.00 INSOMNIA, UNSPECIFIED TYPE: ICD-10-CM

## 2021-12-08 DIAGNOSIS — G47.30 SLEEP APNEA, UNSPECIFIED TYPE: Primary | ICD-10-CM

## 2021-12-08 PROCEDURE — 99213 OFFICE O/P EST LOW 20 MIN: CPT | Mod: 95,,, | Performed by: PSYCHIATRY & NEUROLOGY

## 2021-12-08 PROCEDURE — 99213 PR OFFICE/OUTPT VISIT, EST, LEVL III, 20-29 MIN: ICD-10-PCS | Mod: 95,,, | Performed by: PSYCHIATRY & NEUROLOGY

## 2021-12-08 RX ORDER — ZOLPIDEM TARTRATE 10 MG/1
TABLET ORAL
Qty: 30 TABLET | Refills: 5 | Status: SHIPPED | OUTPATIENT
Start: 2021-12-08 | End: 2022-06-15 | Stop reason: SDUPTHER

## 2021-12-13 ENCOUNTER — PATIENT MESSAGE (OUTPATIENT)
Dept: GASTROENTEROLOGY | Facility: CLINIC | Age: 41
End: 2021-12-13
Payer: MEDICAID

## 2022-01-10 ENCOUNTER — PATIENT MESSAGE (OUTPATIENT)
Dept: FAMILY MEDICINE | Facility: CLINIC | Age: 42
End: 2022-01-10
Payer: MEDICAID

## 2022-01-13 ENCOUNTER — PATIENT MESSAGE (OUTPATIENT)
Dept: FAMILY MEDICINE | Facility: CLINIC | Age: 42
End: 2022-01-13
Payer: MEDICAID

## 2022-01-14 DIAGNOSIS — K58.0 IRRITABLE BOWEL SYNDROME WITH DIARRHEA: ICD-10-CM

## 2022-01-17 ENCOUNTER — PATIENT MESSAGE (OUTPATIENT)
Dept: FAMILY MEDICINE | Facility: CLINIC | Age: 42
End: 2022-01-17
Payer: MEDICAID

## 2022-01-17 RX ORDER — AMITRIPTYLINE HYDROCHLORIDE 50 MG/1
50 TABLET, FILM COATED ORAL NIGHTLY
Qty: 90 TABLET | Refills: 3 | Status: SHIPPED | OUTPATIENT
Start: 2022-01-17 | End: 2023-03-31 | Stop reason: SDUPTHER

## 2022-01-18 ENCOUNTER — TELEPHONE (OUTPATIENT)
Dept: FAMILY MEDICINE | Facility: CLINIC | Age: 42
End: 2022-01-18
Payer: MEDICAID

## 2022-01-18 ENCOUNTER — PATIENT MESSAGE (OUTPATIENT)
Dept: FAMILY MEDICINE | Facility: CLINIC | Age: 42
End: 2022-01-18
Payer: MEDICAID

## 2022-01-18 DIAGNOSIS — F41.1 GAD (GENERALIZED ANXIETY DISORDER): Primary | ICD-10-CM

## 2022-01-18 DIAGNOSIS — F90.0 ADHD (ATTENTION DEFICIT HYPERACTIVITY DISORDER), INATTENTIVE TYPE: ICD-10-CM

## 2022-01-18 NOTE — TELEPHONE ENCOUNTER
Patient was a previous psychiatric patient of Crescencio Padgett, psychiatric NP that is out on medical leave and patient must find another psychiatric provider to treat her ADHD and generalized anxiety.  Last psych NP progress note from August 2021 in media file.      Please contact patient to schedule.

## 2022-01-25 ENCOUNTER — PATIENT OUTREACH (OUTPATIENT)
Dept: ADMINISTRATIVE | Facility: OTHER | Age: 42
End: 2022-01-25
Payer: MEDICAID

## 2022-01-25 ENCOUNTER — PATIENT MESSAGE (OUTPATIENT)
Dept: ADMINISTRATIVE | Facility: OTHER | Age: 42
End: 2022-01-25
Payer: MEDICAID

## 2022-01-25 ENCOUNTER — TELEPHONE (OUTPATIENT)
Dept: SLEEP MEDICINE | Facility: OTHER | Age: 42
End: 2022-01-25
Payer: MEDICAID

## 2022-01-25 NOTE — PROGRESS NOTES
Health Maintenance Due   Topic Date Due    Hepatitis C Screening  Never done    HIV Screening  Never done    Mammogram  10/28/2020    Influenza Vaccine (1) 09/01/2021     Updates were requested from care everywhere.  Chart was reviewed for overdue [facility] Encounters ([facility]) topics (CRS, Breast Cancer Screening, Eye exam)  Health Maintenance has been updated.  LINKS immunization registry triggered.  Immunizations were reconciled.  Mammogram tasked to pt.

## 2022-01-28 ENCOUNTER — TELEPHONE (OUTPATIENT)
Dept: SLEEP MEDICINE | Facility: OTHER | Age: 42
End: 2022-01-28
Payer: MEDICAID

## 2022-02-01 ENCOUNTER — TELEPHONE (OUTPATIENT)
Dept: SLEEP MEDICINE | Facility: OTHER | Age: 42
End: 2022-02-01
Payer: MEDICAID

## 2022-02-07 ENCOUNTER — OFFICE VISIT (OUTPATIENT)
Dept: PSYCHIATRY | Facility: CLINIC | Age: 42
End: 2022-02-07
Payer: MEDICAID

## 2022-02-07 ENCOUNTER — TELEPHONE (OUTPATIENT)
Dept: SLEEP MEDICINE | Facility: OTHER | Age: 42
End: 2022-02-07
Payer: MEDICAID

## 2022-02-07 DIAGNOSIS — F90.0 ADHD (ATTENTION DEFICIT HYPERACTIVITY DISORDER), INATTENTIVE TYPE: ICD-10-CM

## 2022-02-07 DIAGNOSIS — F41.1 GAD (GENERALIZED ANXIETY DISORDER): ICD-10-CM

## 2022-02-07 PROCEDURE — 90792 PR PSYCHIATRIC DIAGNOSTIC EVALUATION W/MEDICAL SERVICES: ICD-10-PCS | Mod: SA,HB,95, | Performed by: NURSE PRACTITIONER

## 2022-02-07 PROCEDURE — 90792 PSYCH DIAG EVAL W/MED SRVCS: CPT | Mod: SA,HB,95, | Performed by: NURSE PRACTITIONER

## 2022-02-07 NOTE — TELEPHONE ENCOUNTER
Left messages to schedule the home sleep study,no response.  Sent out a message through [employer] to schedule.
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
## 2022-02-07 NOTE — PATIENT INSTRUCTIONS
[facility] - DEPARTMENT OF PSYCHIATRY   NEW PATIENT ORIENTATION INFORMATION  OUTPATIENT SERVICES PSYCHIATRY CONTRACT    We appreciate the opportunity to participate in your medical care and hope the following protocols will make it easier for you to receive quality treatment in our department.    1. PUNCTUALITY: Your appointment is scheduled for a fixed amount of time reserved especially for you.  To get the benefit of your appointment, please arrive early enough to allow time for parking and registration.  If you are late for your appointment, your clinician is not able to offer additional time.  Please make every effort to be on time.  You may be asked to reschedule.    2. PAYMENT FOR SERVICES:   Payments are expected at the time of service.  Please contact [phone] if you need to resolve issues involving your account at [facility] or to set up a payment plan.    3. CANCELLATION / MISSED APPOINTMENTS:   In order to receive quality care, all appointments must be kept.  Appointment may be cancelled, ONLY by talking with an  at phone number [phone], between 8:00 a.m. and 5:00 p.m., Monday through Friday, at least 24 hours before your appointment time.  Your clinician reserves this time specifically for you, and if you will be unable to use it, it is necessary that you cancel in a timely manner.  If you do not give at least 24-hour notice of cancellation a fee may be assessed.  Please note that insurance does not cover no-show charges, so you will be billed directly.  If you are consistently late, cancel, or do not show for your appointments, our department reserves the right to terminate treatment.    4. CALLING THE DEPARTMENT:   MESSAGES, SCHEDULE OR CANCEL APPOINTMENTS- In general you can reach the department by calling [phone], between 8:00 a.m. and 5:00 p.m., Monday through Friday, to schedule or cancel appointments or leave a message for your clinician.  It is  advisable to schedule your visits far in advance to obtain the most convenient times for your appointments.   AFTER HOURS, WEEKEND OR HOLIDAYS- For urgent questions after hours, weekends and holidays, calling the department number [phone] will connect you to the [facility] nursing staff or the Psychiatry Inpatient Unit.  The [facility] nursing staff will speak with you and direct your call/care as necessary.   EMERGENCY-  In case of a crisis when there is a concern of harm to self or others, call 911 or the office [phone] between 8:00 a.m. and 5:00 p.m., Monday through Friday.  After hours, weekends or holidays, please call 911 or go to the Emergency Department where you can be thoroughly evaluated by a physician.    5. TEAM APPROACH:  Most patients receive therapy through our team system.  In the team system, your primary therapist will be a nurse practitioner, , psychologist, psychiatry resident or psychiatrist.  If your therapist is a , psychologist or psychiatry resident, please contact your primary therapist first in matters other than medications or acute medical problems.    6. PRESCRIPTION REFILLS:  Prescription refills must be done at your physician office visit.  You will be given a sufficient number of refills to last one extra month beyond your next appointment.  No additional refills will be approved beyond the original treatment plan.  After hours, sufficient medication may be approved to last until the next scheduled appointment. After hours requests for refills on controlled substances will be declined by the psychiatrist on call, as he or she may not be familiar with your case.  Please work closely with your doctor so that you have sufficient medication until your next appointment.  Again, please note that no additional prescriptions will be approved per patient request over the phone.   No additional refills will be approved beyond the original  treatment plan.   In certain exceptional situations, a phone consult appointment may be arranged, with appropriate charge, for the review and approval of prescription refills to last until the next scheduled appointment.    7. FOLLOW UP APPOINTMENTS:  Follow-up appointments can be made in person at the [facility] office, or by calling [phone], from 8am to 5pm, between Monday and Friday.  It is advisable to schedule your office visits far enough in advance to obtain the most convenient times for your appointments.    Revised Aug. 28, 2020 - F/OA1/Newpatient

## 2022-02-21 ENCOUNTER — TELEPHONE (OUTPATIENT)
Dept: SLEEP MEDICINE | Facility: OTHER | Age: 42
End: 2022-02-21
Payer: MEDICAID

## 2022-02-21 NOTE — TELEPHONE ENCOUNTER
Left messages and sent out a message through [employer] to schedule the home sleep study.  No response.

## 2022-03-29 ENCOUNTER — PATIENT OUTREACH (OUTPATIENT)
Dept: ADMINISTRATIVE | Facility: OTHER | Age: 42
End: 2022-03-29
Payer: MEDICAID

## 2022-03-29 DIAGNOSIS — Z12.31 ENCOUNTER FOR SCREENING MAMMOGRAM FOR MALIGNANT NEOPLASM OF BREAST: Primary | ICD-10-CM

## 2022-03-29 NOTE — PROGRESS NOTES
Health Maintenance Due   Topic Date Due    Hepatitis C Screening  Never done    HIV Screening  Never done    Mammogram  10/28/2020    Influenza Vaccine (1) 09/01/2021     Updates were requested from care everywhere.  Chart was reviewed for overdue Proactive [facility] Encounters (CHIQUI) topics (CRS, Breast Cancer Screening, Eye exam)  Health Maintenance has been updated.  LINKS immunization registry triggered.  Immunizations were reconciled.

## 2022-03-30 ENCOUNTER — PATIENT MESSAGE (OUTPATIENT)
Dept: PSYCHIATRY | Facility: CLINIC | Age: 42
End: 2022-03-30
Payer: MEDICAID

## 2022-03-30 ENCOUNTER — OFFICE VISIT (OUTPATIENT)
Dept: GASTROENTEROLOGY | Facility: CLINIC | Age: 42
End: 2022-03-30
Payer: MEDICAID

## 2022-03-30 DIAGNOSIS — K58.0 IRRITABLE BOWEL SYNDROME WITH DIARRHEA: Primary | ICD-10-CM

## 2022-03-30 DIAGNOSIS — R10.13 EPIGASTRIC PAIN: ICD-10-CM

## 2022-03-30 DIAGNOSIS — Z83.719 FAMILY HISTORY OF COLONIC POLYPS: ICD-10-CM

## 2022-03-30 DIAGNOSIS — F90.0 ADHD (ATTENTION DEFICIT HYPERACTIVITY DISORDER), INATTENTIVE TYPE: Primary | ICD-10-CM

## 2022-03-30 PROCEDURE — 1159F MED LIST DOCD IN RCRD: CPT | Mod: CPTII,95,, | Performed by: INTERNAL MEDICINE

## 2022-03-30 PROCEDURE — 99214 PR OFFICE/OUTPT VISIT, EST, LEVL IV, 30-39 MIN: ICD-10-PCS | Mod: 95,,, | Performed by: INTERNAL MEDICINE

## 2022-03-30 PROCEDURE — 1160F RVW MEDS BY RX/DR IN RCRD: CPT | Mod: CPTII,95,, | Performed by: INTERNAL MEDICINE

## 2022-03-30 PROCEDURE — 1160F PR REVIEW ALL MEDS BY PRESCRIBER/CLIN PHARMACIST DOCUMENTED: ICD-10-PCS | Mod: CPTII,95,, | Performed by: INTERNAL MEDICINE

## 2022-03-30 PROCEDURE — 99214 OFFICE O/P EST MOD 30 MIN: CPT | Mod: 95,,, | Performed by: INTERNAL MEDICINE

## 2022-03-30 PROCEDURE — 1159F PR MEDICATION LIST DOCUMENTED IN MEDICAL RECORD: ICD-10-PCS | Mod: CPTII,95,, | Performed by: INTERNAL MEDICINE

## 2022-03-30 RX ORDER — PANTOPRAZOLE SODIUM 40 MG/1
40 TABLET, DELAYED RELEASE ORAL DAILY
Qty: 90 TABLET | Refills: 3 | Status: SHIPPED | OUTPATIENT
Start: 2022-03-30 | End: 2023-01-30
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
## 2022-03-30 NOTE — PATIENT INSTRUCTIONS
CLENPIQ Instructions    You are scheduled for a colonoscopy with Dr. Hood on Monday, May 2 at [facility] located at [address] in [place].  Enter through the South Entrance #2 (by the cafeteria).  Go straight back down the hunter and check-in at Same Day Surgery.      You will receive a call 1-2 days before your colonoscopy to tell you the time to arrive.  If you have not received a call by the day before your procedure, call the Endoscopy Lab at [phone].    To ensure that your test is accurate and complete, you MUST follow these instructions listed below.  If you have any questions, please call our office at [phone].  Plan on being at the hospital for your procedure for 3-4 hours. Please contact the office at [phone] two days prior to procedure date if reschedule is needed.    1.  Follow a CLEAR LIQUID DIET for the entire day before your scheduled colonoscopy.  This means no solid food the entire day starting when you wake.  You may have as much of the clear liquids as you want throughout the day.   CLEAR LIQUID DIET:   - NO DAIRY   - You can have:  Coffee with sugar (no creamer), tea, water, soda, apple or white grape juice, chicken or beef broth/bouillon (no meat, noodles, or veggies), green/yellow popsicles, green/yellow Jell-O, lemonade.    2.  AT 5 pm the evening before your colonoscopy, OPEN ONE (1) BOTTLE OF CLENPIQ AND DRINK THE ENTIRE BOTTLE.  DRINK FIVE (5) 8-OUNCE GLASSES OF WATER (40 OUNCES TOTAL) OVER THE NEXT FIVE (5) HOURS.     3.  The endoscopy department will call you 1 day before your colonoscopy to tell you the exact time to arrive, AND to tell you the exact time to drink the 2nd portion of your prep (which will be FIVE HOURS BEFORE YOUR ARRIVAL TIME).  At this time given to you, OPEN THE OTHER ONE (1) BOTTLE OF CLENPIQ AND DRINK THE ENTIRE BOTTLE.  DRINK THREE (3) 8-OUNCE GLASSES OF WATER (24 OUNCES TOTAL) OVER THE NEXT THREE (3) HOURS. Once this is  complete, you can not have anything else by mouth!    4.  You must have someone with you to DRIVE YOU HOME since you will be receiving IV sedation for the colonoscopy.    5.  It is ok to take MOST of your REGULAR MEDICATIONS  in the morning of your test with a SIP of water.  THE ONLY MEDS YOU NEED TO HOLD ARE YOUR DIABETES MEDICATIONS,  SOME BLOOD PRESSURE MEDS, AND BLOOD THINNERS IF OK'D BY YOUR DOCTOR.  Do NOT have anything else to eat or drink the morning of your colonoscopy.  It is ok to brush your teeth.    6.  If you are on blood thinners THAT YOU HAVE BEEN INSTRUCTED TO HOLD BY YOUR DOCTOR FOR THIS PROCEDURE, then do NOT take this the morning of your colonoscopy.  Do NOT stop these medications on your own, they must be approved to be held by your doctor.  Your colonoscopy can NOT be done if you are on these medications.  Examples of blood thinners include: Coumadin, Aggrenox, Plavix, Pradaxa, Reapro, Pletal, Xarelto, Ticagrelor, Brilinta, Eliquis, and high dose aspirin (325 mg).  You do not have to stop baby aspirin 81 mg.    7.  IF YOU ARE DIABETIC:  NO INSULIN OR ORAL MEDICATIONS THE MORNING OF THE COLONOSCOPY.  TAKE ONLY HALF THE DOSE OF YOUR INSULIN THE DAY BEFORE THE COLONOSCOPY.  DO NOT TAKE ANY ORAL DIABETIC MEDICATIONS THE DAY BEFORE THE COLONOSCOPY.  IF YOU ARE AN INSULIN DEPENDENT DIABETIC WITH UNSTABLE BLOOD SUGARS, NOTIFY YOUR PRIMARY CARE PHYSICIAN FOR INSTRUCTIONS.    8.  Please DO use your inhalers the morning of your procedure.

## 2022-04-11 RX ORDER — SEGESTERONE ACETATE AND ETHINYL ESTRADIOL 103; 17.4 MG/1; MG/1
1 RING VAGINAL
Qty: 1 EACH | Refills: 0 | Status: SHIPPED | OUTPATIENT
Start: 2022-04-11 | End: 2022-08-10 | Stop reason: ALTCHOICE

## 2022-04-20 ENCOUNTER — PATIENT MESSAGE (OUTPATIENT)
Dept: OBSTETRICS AND GYNECOLOGY | Facility: CLINIC | Age: 42
End: 2022-04-20
Payer: MEDICAID

## 2022-04-20 DIAGNOSIS — N92.1 BREAKTHROUGH BLEEDING: Primary | ICD-10-CM

## 2022-04-20 DIAGNOSIS — N93.0 POSTCOITAL BLEEDING: ICD-10-CM

## 2022-04-21 ENCOUNTER — TELEPHONE (OUTPATIENT)
Dept: GASTROENTEROLOGY | Facility: CLINIC | Age: 42
End: 2022-04-21
Payer: MEDICAID

## 2022-04-21 DIAGNOSIS — Z01.812 PRE-PROCEDURE LAB EXAM: ICD-10-CM

## 2022-04-22 ENCOUNTER — PATIENT MESSAGE (OUTPATIENT)
Dept: PSYCHIATRY | Facility: CLINIC | Age: 42
End: 2022-04-22
Payer: MEDICAID

## 2022-04-22 NOTE — TELEPHONE ENCOUNTER
Patient would like to proceed with D&C and Ablation. Does not have permanent form of sterilization.

## 2022-04-27 ENCOUNTER — PATIENT MESSAGE (OUTPATIENT)
Dept: OBSTETRICS AND GYNECOLOGY | Facility: CLINIC | Age: 42
End: 2022-04-27
Payer: MEDICAID

## 2022-04-27 ENCOUNTER — PATIENT MESSAGE (OUTPATIENT)
Dept: PSYCHIATRY | Facility: CLINIC | Age: 42
End: 2022-04-27
Payer: MEDICAID

## 2022-04-28 ENCOUNTER — TELEPHONE (OUTPATIENT)
Dept: SLEEP MEDICINE | Facility: OTHER | Age: 42
End: 2022-04-28
Payer: MEDICAID

## 2022-04-28 NOTE — TELEPHONE ENCOUNTER
Hysteroscopic D&C/Ablation scheduled for 5/13/2022 with pre-op, pre-admit and post op appointments scheduled and discussed with patient.  Pt verbalized understanding.  Case request number [id number].  Joyce in surgery department notified of date and time.

## 2022-04-29 ENCOUNTER — PATIENT MESSAGE (OUTPATIENT)
Dept: OBSTETRICS AND GYNECOLOGY | Facility: CLINIC | Age: 42
End: 2022-04-29
Payer: MEDICAID

## 2022-04-29 DIAGNOSIS — Z01.818 PREOP EXAMINATION: Primary | ICD-10-CM

## 2022-04-29 RX ORDER — DEXTROAMPHETAMINE SACCHARATE, AMPHETAMINE ASPARTATE, DEXTROAMPHETAMINE SULFATE AND AMPHETAMINE SULFATE 5; 5; 5; 5 MG/1; MG/1; MG/1; MG/1
20 TABLET ORAL 2 TIMES DAILY
Qty: 60 TABLET | Refills: 0 | Status: SHIPPED | OUTPATIENT
Start: 2022-04-29 | End: 2022-05-26 | Stop reason: SDUPTHER

## 2022-05-02 ENCOUNTER — TELEPHONE (OUTPATIENT)
Dept: SLEEP MEDICINE | Facility: OTHER | Age: 42
End: 2022-05-02
Payer: MEDICAID

## 2022-05-10 ENCOUNTER — ANESTHESIA EVENT (OUTPATIENT)
Dept: SURGERY | Facility: HOSPITAL | Age: 42
End: 2022-05-10
Payer: MEDICAID

## 2022-05-10 ENCOUNTER — HOSPITAL ENCOUNTER (OUTPATIENT)
Dept: PREADMISSION TESTING | Facility: HOSPITAL | Age: 42
Discharge: HOME | End: 2022-05-10
Attending: INTERNAL MEDICINE
Payer: MEDICAID

## 2022-05-10 ENCOUNTER — PATIENT OUTREACH (OUTPATIENT)
Dept: ADMINISTRATIVE | Facility: OTHER | Age: 42
End: 2022-05-10
Payer: MEDICAID

## 2022-05-10 ENCOUNTER — OFFICE VISIT (OUTPATIENT)
Dept: OBSTETRICS AND GYNECOLOGY | Facility: CLINIC | Age: 42
End: 2022-05-10
Payer: MEDICAID

## 2022-05-10 VITALS
DIASTOLIC BLOOD PRESSURE: 86 MMHG | BODY MASS INDEX: 29.96 KG/M2 | HEART RATE: 71 BPM | RESPIRATION RATE: 16 BRPM | HEIGHT: 64 IN | WEIGHT: 175.5 LBS | SYSTOLIC BLOOD PRESSURE: 124 MMHG

## 2022-05-10 DIAGNOSIS — N92.1 MENORRHAGIA WITH IRREGULAR CYCLE: Primary | ICD-10-CM

## 2022-05-10 DIAGNOSIS — N92.1 MENORRHAGIA WITH IRREGULAR CYCLE: ICD-10-CM

## 2022-05-10 DIAGNOSIS — N39.46 MIXED INCONTINENCE: ICD-10-CM

## 2022-05-10 LAB — B-HCG UR QL: NEGATIVE

## 2022-05-10 PROCEDURE — 3079F PR MOST RECENT DIASTOLIC BLOOD PRESSURE 80-89 MM HG: ICD-10-PCS | Mod: CPTII,,, | Performed by: OBSTETRICS & GYNECOLOGY

## 2022-05-10 PROCEDURE — 3079F DIAST BP 80-89 MM HG: CPT | Mod: CPTII,,, | Performed by: OBSTETRICS & GYNECOLOGY

## 2022-05-10 PROCEDURE — 1159F PR MEDICATION LIST DOCUMENTED IN MEDICAL RECORD: ICD-10-PCS | Mod: CPTII,,, | Performed by: OBSTETRICS & GYNECOLOGY

## 2022-05-10 PROCEDURE — 1159F MED LIST DOCD IN RCRD: CPT | Mod: CPTII,,, | Performed by: OBSTETRICS & GYNECOLOGY

## 2022-05-10 PROCEDURE — 99999 PR PBB SHADOW E&M-EST. PATIENT-LVL IV: CPT | Mod: PBBFAC,,, | Performed by: OBSTETRICS & GYNECOLOGY

## 2022-05-10 PROCEDURE — 81025 URINE PREGNANCY TEST: CPT | Performed by: OBSTETRICS & GYNECOLOGY

## 2022-05-10 PROCEDURE — 99999 PR PBB SHADOW E&M-EST. PATIENT-LVL IV: ICD-10-PCS | Mod: PBBFAC,,, | Performed by: OBSTETRICS & GYNECOLOGY

## 2022-05-10 PROCEDURE — 3008F BODY MASS INDEX DOCD: CPT | Mod: CPTII,,, | Performed by: OBSTETRICS & GYNECOLOGY

## 2022-05-10 PROCEDURE — 1160F RVW MEDS BY RX/DR IN RCRD: CPT | Mod: CPTII,,, | Performed by: OBSTETRICS & GYNECOLOGY

## 2022-05-10 PROCEDURE — 3008F PR BODY MASS INDEX (BMI) DOCUMENTED: ICD-10-PCS | Mod: CPTII,,, | Performed by: OBSTETRICS & GYNECOLOGY

## 2022-05-10 PROCEDURE — 99214 OFFICE O/P EST MOD 30 MIN: CPT | Mod: PBBFAC | Performed by: OBSTETRICS & GYNECOLOGY

## 2022-05-10 PROCEDURE — 3074F SYST BP LT 130 MM HG: CPT | Mod: CPTII,,, | Performed by: OBSTETRICS & GYNECOLOGY

## 2022-05-10 PROCEDURE — 99213 OFFICE O/P EST LOW 20 MIN: CPT | Mod: S$PBB,,, | Performed by: OBSTETRICS & GYNECOLOGY

## 2022-05-10 PROCEDURE — 99213 PR OFFICE/OUTPT VISIT, EST, LEVL III, 20-29 MIN: ICD-10-PCS | Mod: S$PBB,,, | Performed by: OBSTETRICS & GYNECOLOGY

## 2022-05-10 PROCEDURE — 1160F PR REVIEW ALL MEDS BY PRESCRIBER/CLIN PHARMACIST DOCUMENTED: ICD-10-PCS | Mod: CPTII,,, | Performed by: OBSTETRICS & GYNECOLOGY

## 2022-05-10 PROCEDURE — 3074F PR MOST RECENT SYSTOLIC BLOOD PRESSURE < 130 MM HG: ICD-10-PCS | Mod: CPTII,,, | Performed by: OBSTETRICS & GYNECOLOGY

## 2022-05-10 RX ORDER — SODIUM CHLORIDE, SODIUM LACTATE, POTASSIUM CHLORIDE, CALCIUM CHLORIDE 600; 310; 30; 20 MG/100ML; MG/100ML; MG/100ML; MG/100ML
INJECTION, SOLUTION INTRAVENOUS CONTINUOUS
Status: CANCELLED | OUTPATIENT
Start: 2022-05-10

## 2022-05-10 NOTE — PROGRESS NOTES
Health Maintenance Due   Topic Date Due    Hepatitis C Screening  Never done    HIV Screening  Never done    Mammogram  10/28/2020     Updates were requested from care everywhere.  Chart was reviewed for overdue Proactive [facility] Encounters ([facility]) topics (CRS, Breast Cancer Screening, Eye exam)  Health Maintenance has been updated.

## 2022-05-10 NOTE — PROGRESS NOTES
Subjective:       Patient ID: Judie Schmitz is a 41 y.o. female.    Chief Complaint:  Pre-op Exam      History of Present Illness  Patient presents for preop for D&C and ablation.  Patient has been on hormone it either by ring or by oral to control bleeding but has had increasing breakthrough bleeding irregularity.  She would like to proceed with D&C and ablation.  Patient states she has also had a bumping sensation during intercourse and feels like her  is hitting something.  Patient request exam for evaluation.  Patient also admits to some incontinence of urine in a variety of situations.  Counseling was done and patient like to do physical therapy.  Informed consents obtained.    Menstrual History:  OB History        2    Para   2    Term   2            AB        Living   2       SAB        IAB        Ectopic        Multiple        Live Births                    Menarche age:  Patient's last menstrual period was 04/15/2022 (exact date).         Review of Systems  Review of Systems   Constitutional: Negative for activity change, appetite change, chills, diaphoresis, fatigue, fever and unexpected weight change.   HENT: Negative for congestion, dental problem, drooling, ear discharge, ear pain, facial swelling, hearing loss, mouth sores, nosebleeds, postnasal drip, rhinorrhea, sinus pressure, sneezing, sore throat, tinnitus, trouble swallowing and voice change.    Eyes: Negative for photophobia, pain, discharge, redness, itching and visual disturbance.   Respiratory: Negative for apnea, cough, choking, chest tightness, shortness of breath, wheezing and stridor.    Cardiovascular: Negative for chest pain, palpitations and leg swelling.   Gastrointestinal: Negative for abdominal distention, abdominal pain, anal bleeding, blood in stool, constipation, diarrhea, nausea, rectal pain and vomiting.   Endocrine: Negative for cold intolerance, heat intolerance, polydipsia, polyphagia and  polyuria.   Genitourinary: Negative for decreased urine volume, difficulty urinating, dyspareunia, dysuria, enuresis, flank pain, frequency, genital sores, hematuria, menstrual problem, pelvic pain, urgency, vaginal bleeding, vaginal discharge and vaginal pain.   Musculoskeletal: Negative for arthralgias, back pain, gait problem, joint swelling, myalgias, neck pain and neck stiffness.   Skin: Negative for color change, pallor, rash and wound.   Allergic/Immunologic: Negative for environmental allergies, food allergies and immunocompromised state.   Neurological: Negative for dizziness, tremors, seizures, syncope, facial asymmetry, speech difficulty, weakness, light-headedness, numbness and headaches.   Hematological: Negative for adenopathy. Does not bruise/bleed easily.   Psychiatric/Behavioral: Negative for agitation, behavioral problems, confusion, decreased concentration, dysphoric mood, hallucinations, self-injury, sleep disturbance and suicidal ideas. The patient is not nervous/anxious and is not hyperactive.            Objective:      Physical Exam  Vitals and nursing note reviewed.   Constitutional:       Appearance: She is well-developed.   Neck:      Thyroid: No thyromegaly.   Cardiovascular:      Rate and Rhythm: Normal rate and regular rhythm.   Pulmonary:      Effort: Pulmonary effort is normal.      Breath sounds: Normal breath sounds.   Abdominal:      General: Bowel sounds are normal.      Palpations: Abdomen is soft. There is no mass.      Tenderness: There is no abdominal tenderness.      Hernia: There is no hernia in the left inguinal area.   Genitourinary:     Vagina: Normal. No foreign body. No vaginal discharge or tenderness.      Cervix: No cervical motion tenderness ( Cervix is somewhat prolapsed and may be the source her sensation during intercourse), discharge or friability.      Uterus: Not enlarged and not tender.       Adnexa:         Right: No mass, tenderness or fullness.           Left: No mass, tenderness or fullness.        Rectum: No external hemorrhoid.   Musculoskeletal:         General: Normal range of motion.   Skin:     General: Skin is dry.   Neurological:      Mental Status: She is alert and oriented to person, place, and time.      Deep Tendon Reflexes: Reflexes are normal and symmetric.   Psychiatric:         Behavior: Behavior normal.         Thought Content: Thought content normal.         Judgment: Judgment normal.             Assessment:        1. Menorrhagia with irregular cycle                Plan:         Judie was seen today for pre-op exam.    Diagnoses and all orders for this visit:    Menorrhagia with irregular cycle

## 2022-05-10 NOTE — DISCHARGE INSTRUCTIONS
[facility]  Pre Admit Instructions    Day and Date of Procedure: Friday 5/13/2  Arrival time: 8am      Call your doctor if you become ill, have an infection, or are taking antibiotics before your surgery  Someone will call you between 1 p.m. And 5 p.m. the workday before the procedure to give you an arrival time       - If your procedure is before 7 a.m. enter through Emergency Room door and check in with them       - 7 a.m. to 5 p.m. Enter through main entrance and check in with registration  You must have a responsible  to bring you home  Only one person will be allowed per patient due to Covid-19 restrictions  You must wear a mask at all times. If you do not have a mask, we will provide you with one. No Exception.   Cafeteria hours for guest: 7am to 10am; 11am to 1:30 pm.    Do NOT eat anything past: midnight  Clear liquids until: 4am  No dairy, creamers, gum or mints the morning of your procedure    Please    Do not wear makeup, jewelry, nail polish or body piercings  Bring containers/solution for contacts, dentures, bridges - these and hearing aids will be removed before your procedure  Do not bring cash, jewelry or valuables the day of your procedure   No smoking at least 24 hours before your procedure  Wear clothing that is comfortable and easy to take off and put on  Do NOT shave for at least 5 days before your surgery    PRE-OP Hibiclens bath Instructions:    Shower with Hibiclens the Night before and morning of the procedure.   Wash your face with your regular cleanser. DO NOT use Hibiclens on your face.  Thoroughly rinse your body with warm water from the neck down  Apply Hibiclens directly to your skin or on a wet washcloth and wash gently. Do not stand under the water while washing with Hibiclens as you will   remove the wash too soon.  Rinse thoroughly with warm water  DO NOT use your regular soap after you have bathed with the Hibiclens  Do not apply lotion or deodorant   Put on a  clean pair of clothes after each bath          Information about your stay (Please Review)    Before Surgery  Your doctor may order and review labs, x-rays, ECG or other tests as a pre-surgery workup and will call you if there is need for follow up.  If you did not get a Covid test before your procedure, one will be done when you arrive. You must have a negative Covid test result before your procedure.  No smoking inside or outside the hospital. You must leave the campus to smoke.  Wear clothing that is easy to take off and put on.  The hospital will provide you with a gown.  You may bring robe, slippers, nightwear, and toiletries (toothbrush, toothpaste, makeup) if needed.  If your doctor orders a Fleets Enema or other prep, follow package and/or doctors orders.  Brush your teeth and rinse your mouth the morning of surgery, but dont swallow the water.  The nurse will ask questions and check your condition.  The doctor may jeannine your surgical site.  Compression boots will be placed on your calves to reduce the risk of blood clots.  An IV will be started in pre-procedure area.  The doctor may order medicine to help you relax before surgery.  After Surgery  After you recover in post-procedure area you will go to the medicine floor to recover for a few more hours.  The nurse will check your temperature, breathing, blood pressure, heart rate, IV site, and surgery site.  A diet will be ordered-most start with ice chips and then advance slowly to other foods.  If you have IV fluids the IV pump will beep to let the nurse know that she needs to check it.  You may have a urinary catheter and staff may measure your oral intake and urine output.  Pain medication may be ordered by the doctor after surgery.  If you have a pain management device tell your caretakers not to press the button because of OVERDOSE RISK.  When the nurse or doctor tells you it is okay to get out of bed, ask for help until you are stable.  The nurse may  ask you to turn, cough, and deep breathe to prevent lung problems.  You can use a pillow to hold your incision when you deep breathe or cough to reduce pain.  The nurse will give you discharge instructions--incision care, symptoms to report to your doctor, and your follow-up appointment when you are discharged. You cannot drive yourself home.  Only one person is allowed during your stay due to Covid-19 restrictions. Visiting hours are 8 am to 6 pm.    Goals for Discharge from One Day Surgery  Control pain with an oral medication  Walk without feeling dizzy or weak  Tolerate liquids well  Urinate without difficulty    Things you can do to  Reduce the Risk of Infections or Complications  Wash Hands and use Waterless Hand Sanitizers  Wash hands frequently with soap and warm water for at least 15 seconds.   Use hand sanitizers (alcohol based) often at home and in public if hands are not visibly soiled  Take Antibiotic Exactly as Prescribed  Do not stop antibiotics too soon; you risk developing infection resistant to antibiotics  Take your antibiotic even if you are feeling better and even if they upset your stomach  Call the doctor if you cant tolerate the antibiotic or you have an allergic reaction  Stay Healthy  Take medicines as prescribed by your doctor  Keep your diabetes under control - diet and medication  Get enough rest, exercise and eat a healthy diet  Keep the Wound Clean and Dry  Wash hands before and after taking care of the incision (cut)  Wash hands when you remove a dressing, before you touch/apply a new dressing  Shower and clean incision with antibacterial soap and rinse well if the doctor approves  Allow the cut to dry completely before putting on a clean dressing  Do not touch the part of the bandage that will cover the incision  Do not use ointments unless your doctor tells you to-can promote bacterial growth  If ordered, put ointment directly on the dressing-do not touch the end of the tube  Do  not scrub, remove scabs, or leave a damp dressing on the incision  Do not use peroxide or alcohol to clean the incision unless the doctor tells you to   Do not let children, pets or anyone else contaminate the incision  Stop Smoking To Prevent Infection  Stop smoking-Centers for Disease Control recommends 30 days before surgery  Smokers get more infections after surgery-studies have shown 6 times the risk  Smokers have more scarring and heal slower-open wounds get infected easier  Prevent Respiratory complications  Stop smoking  Turn, cough, and deep breathe even if you have some pain when you do so.  Splint your incision with a pillow when you cough/deep breath, to help control pain.  Do not lie in one position for long periods of time.   Prevent Blood Clots  When you wake move your legs, flex your feet, rotate your ankles, wiggle your toes  Get up when the doctor says its ok.  Dangle your feet from the side of the bed  Report symptoms-leg pain, redness/swelling, warm to touch; fever; shortness of breath, chest pain, severe upper back pain.

## 2022-05-13 ENCOUNTER — ANESTHESIA (OUTPATIENT)
Dept: SURGERY | Facility: HOSPITAL | Age: 42
End: 2022-05-13
Payer: MEDICAID

## 2022-05-13 ENCOUNTER — HOSPITAL ENCOUNTER (OUTPATIENT)
Facility: HOSPITAL | Age: 42
Discharge: HOME | End: 2022-05-13
Attending: OBSTETRICS & GYNECOLOGY | Admitting: OBSTETRICS & GYNECOLOGY
Payer: MEDICAID

## 2022-05-13 VITALS
DIASTOLIC BLOOD PRESSURE: 75 MMHG | OXYGEN SATURATION: 100 % | SYSTOLIC BLOOD PRESSURE: 125 MMHG | RESPIRATION RATE: 18 BRPM | TEMPERATURE: 96 F | HEART RATE: 67 BPM

## 2022-05-13 DIAGNOSIS — N92.1 MENORRHAGIA WITH IRREGULAR CYCLE: ICD-10-CM

## 2022-05-13 PROBLEM — N93.0 POSTCOITAL BLEEDING: Status: ACTIVE | Noted: 2022-05-13

## 2022-05-13 PROCEDURE — 36000706: Performed by: OBSTETRICS & GYNECOLOGY

## 2022-05-13 PROCEDURE — 27201423 OPTIME MED/SURG SUP & DEVICES STERILE SUPPLY: Performed by: OBSTETRICS & GYNECOLOGY

## 2022-05-13 PROCEDURE — D9220AH HC ANESTHESIA PROFESSIONAL FEE: Mod: QZ | Performed by: NURSE ANESTHETIST, CERTIFIED REGISTERED

## 2022-05-13 PROCEDURE — 63600175 PHARM REV CODE 636 W HCPCS: Performed by: OBSTETRICS & GYNECOLOGY

## 2022-05-13 PROCEDURE — 37000009 HC ANESTHESIA EA ADD 15 MINS: Performed by: OBSTETRICS & GYNECOLOGY

## 2022-05-13 PROCEDURE — 25000003 PHARM REV CODE 250: Performed by: OBSTETRICS & GYNECOLOGY

## 2022-05-13 PROCEDURE — 88305 TISSUE EXAM BY PATHOLOGIST: CPT | Mod: 26,,, | Performed by: PATHOLOGY

## 2022-05-13 PROCEDURE — 00952 ANES VAG PX HYSTSC&/HSG: CPT | Performed by: OBSTETRICS & GYNECOLOGY

## 2022-05-13 PROCEDURE — 58563 HYSTEROSCOPY ABLATION: CPT | Mod: ,,, | Performed by: OBSTETRICS & GYNECOLOGY

## 2022-05-13 PROCEDURE — 36000707: Performed by: OBSTETRICS & GYNECOLOGY

## 2022-05-13 PROCEDURE — 25000003 PHARM REV CODE 250: Performed by: NURSE ANESTHETIST, CERTIFIED REGISTERED

## 2022-05-13 PROCEDURE — 37000008 HC ANESTHESIA 1ST 15 MINUTES: Performed by: OBSTETRICS & GYNECOLOGY

## 2022-05-13 PROCEDURE — 58563 PR HYSTEROSCOPY,W/ENDOMETRIAL ABLATION: ICD-10-PCS | Mod: ,,, | Performed by: OBSTETRICS & GYNECOLOGY

## 2022-05-13 PROCEDURE — 63600175 PHARM REV CODE 636 W HCPCS: Performed by: NURSE ANESTHETIST, CERTIFIED REGISTERED

## 2022-05-13 PROCEDURE — 88305 TISSUE EXAM BY PATHOLOGIST: ICD-10-PCS | Mod: 26,,, | Performed by: PATHOLOGY

## 2022-05-13 PROCEDURE — 71000033 HC RECOVERY, INTIAL HOUR: Performed by: OBSTETRICS & GYNECOLOGY

## 2022-05-13 PROCEDURE — 88305 TISSUE EXAM BY PATHOLOGIST: CPT | Performed by: PATHOLOGY

## 2022-05-13 PROCEDURE — 00952 ANES VAG PX HYSTSC&/HSG: CPT | Mod: QZ | Performed by: NURSE ANESTHETIST, CERTIFIED REGISTERED

## 2022-05-13 RX ORDER — PROPOFOL 10 MG/ML
INJECTION, EMULSION INTRAVENOUS
Status: DISCONTINUED | OUTPATIENT
Start: 2022-05-13 | End: 2022-05-13

## 2022-05-13 RX ORDER — MIDAZOLAM HYDROCHLORIDE 1 MG/ML
INJECTION INTRAMUSCULAR; INTRAVENOUS
Status: DISCONTINUED | OUTPATIENT
Start: 2022-05-13 | End: 2022-05-13

## 2022-05-13 RX ORDER — OXYCODONE AND ACETAMINOPHEN 5; 325 MG/1; MG/1
1 TABLET ORAL EVERY 4 HOURS PRN
Status: DISCONTINUED | OUTPATIENT
Start: 2022-05-13 | End: 2022-05-13 | Stop reason: HOSPADM

## 2022-05-13 RX ORDER — SODIUM CHLORIDE 0.9 % (FLUSH) 0.9 %
3 SYRINGE (ML) INJECTION EVERY 8 HOURS
Status: DISCONTINUED | OUTPATIENT
Start: 2022-05-13 | End: 2022-05-13 | Stop reason: HOSPADM

## 2022-05-13 RX ORDER — DEXAMETHASONE SODIUM PHOSPHATE 4 MG/ML
INJECTION, SOLUTION INTRA-ARTICULAR; INTRALESIONAL; INTRAMUSCULAR; INTRAVENOUS; SOFT TISSUE
Status: DISCONTINUED | OUTPATIENT
Start: 2022-05-13 | End: 2022-05-13

## 2022-05-13 RX ORDER — DIPHENHYDRAMINE HYDROCHLORIDE 50 MG/ML
25 INJECTION INTRAMUSCULAR; INTRAVENOUS EVERY 6 HOURS PRN
Status: DISCONTINUED | OUTPATIENT
Start: 2022-05-13 | End: 2022-05-13 | Stop reason: HOSPADM

## 2022-05-13 RX ORDER — SODIUM CHLORIDE 0.9 % (FLUSH) 0.9 %
3 SYRINGE (ML) INJECTION
Status: DISCONTINUED | OUTPATIENT
Start: 2022-05-13 | End: 2022-05-13 | Stop reason: HOSPADM

## 2022-05-13 RX ORDER — ONDANSETRON 2 MG/ML
4 INJECTION INTRAMUSCULAR; INTRAVENOUS DAILY PRN
Status: DISCONTINUED | OUTPATIENT
Start: 2022-05-13 | End: 2022-05-13 | Stop reason: HOSPADM

## 2022-05-13 RX ORDER — LIDOCAINE HCL/PF 100 MG/5ML
SYRINGE (ML) INTRAVENOUS
Status: DISCONTINUED | OUTPATIENT
Start: 2022-05-13 | End: 2022-05-13

## 2022-05-13 RX ORDER — FENTANYL CITRATE 50 UG/ML
INJECTION, SOLUTION INTRAMUSCULAR; INTRAVENOUS
Status: DISCONTINUED | OUTPATIENT
Start: 2022-05-13 | End: 2022-05-13

## 2022-05-13 RX ORDER — KETAMINE HYDROCHLORIDE 100 MG/ML
INJECTION, SOLUTION INTRAMUSCULAR; INTRAVENOUS
Status: DISCONTINUED | OUTPATIENT
Start: 2022-05-13 | End: 2022-05-13

## 2022-05-13 RX ORDER — SODIUM CHLORIDE, SODIUM LACTATE, POTASSIUM CHLORIDE, CALCIUM CHLORIDE 600; 310; 30; 20 MG/100ML; MG/100ML; MG/100ML; MG/100ML
INJECTION, SOLUTION INTRAVENOUS CONTINUOUS
Status: DISCONTINUED | OUTPATIENT
Start: 2022-05-13 | End: 2022-05-13 | Stop reason: HOSPADM

## 2022-05-13 RX ORDER — OXYCODONE AND ACETAMINOPHEN 5; 325 MG/1; MG/1
1 TABLET ORAL EVERY 4 HOURS PRN
Qty: 10 TABLET | Refills: 0 | Status: SHIPPED | OUTPATIENT
Start: 2022-05-13 | End: 2022-08-10 | Stop reason: ALTCHOICE

## 2022-05-13 RX ADMIN — LIDOCAINE HYDROCHLORIDE 50 MG: 20 INJECTION, SOLUTION INTRAVENOUS at 08:05

## 2022-05-13 RX ADMIN — FENTANYL CITRATE 100 MCG: 50 INJECTION, SOLUTION INTRAMUSCULAR; INTRAVENOUS at 08:05

## 2022-05-13 RX ADMIN — DEXAMETHASONE SODIUM PHOSPHATE 8 MG: 4 INJECTION, SOLUTION INTRAMUSCULAR; INTRAVENOUS at 09:05

## 2022-05-13 RX ADMIN — PROPOFOL 50 MG: 10 INJECTION, EMULSION INTRAVENOUS at 09:05

## 2022-05-13 RX ADMIN — KETAMINE HYDROCHLORIDE 50 MG: 100 INJECTION, SOLUTION, CONCENTRATE INTRAMUSCULAR; INTRAVENOUS at 08:05

## 2022-05-13 RX ADMIN — MIDAZOLAM HYDROCHLORIDE 2 MG: 1 INJECTION, SOLUTION INTRAMUSCULAR; INTRAVENOUS at 08:05

## 2022-05-13 RX ADMIN — OXYCODONE HYDROCHLORIDE AND ACETAMINOPHEN 1 TABLET: 5; 325 TABLET ORAL at 11:05

## 2022-05-13 RX ADMIN — ONDANSETRON 8 MG: 2 INJECTION, SOLUTION INTRAMUSCULAR; INTRAVENOUS at 09:05

## 2022-05-13 RX ADMIN — PROPOFOL 50 MG: 10 INJECTION, EMULSION INTRAVENOUS at 08:05

## 2022-05-13 RX ADMIN — SODIUM CHLORIDE, SODIUM LACTATE, POTASSIUM CHLORIDE, AND CALCIUM CHLORIDE: .6; .31; .03; .02 INJECTION, SOLUTION INTRAVENOUS at 08:05

## 2022-05-13 RX ADMIN — PROPOFOL 100 MG: 10 INJECTION, EMULSION INTRAVENOUS at 08:05

## 2022-05-13 NOTE — ANESTHESIA POSTPROCEDURE EVALUATION
Anesthesia Post Evaluation    Patient: Judie Schmitz    Procedure(s) Performed: Procedure(s) (LRB):  HYSTEROSCOPY, WITH DILATION AND CURETTAGE OF UTERUS (N/A)  ABLATION, ENDOMETRIUM, THERMAL (N/A)    Final Anesthesia Type: general      Patient location during evaluation: PACU  Patient participation: Yes- Able to Participate  Level of consciousness: awake and alert, oriented and awake  Post-procedure vital signs: reviewed and stable  Pain management: adequate  Airway patency: patent    PONV status at discharge: No PONV  Anesthetic complications: no      Cardiovascular status: blood pressure returned to baseline, hemodynamically stable and stable  Respiratory status: unassisted, spontaneous ventilation and room air  Hydration status: euvolemic  Follow-up not needed.          Vitals Value Taken Time   /82 05/13/22 0946   Temp 37 05/13/22 0949   Pulse 83 05/13/22 0946   Resp 18 05/13/22 0946   SpO2 99 % 05/13/22 0946         Event Time   Out of Recovery 09:47:39         Pain/Vickey Score: Vickey Score: 10 (5/13/2022  9:27 AM)

## 2022-05-13 NOTE — PLAN OF CARE
Patient up ad burt. Voids freely. Oxycodone prn pain. SCD's post-op. Tolerated dessert. Reviewed AVS with patient. RX delivered at bedside. Patient instructed to continue cough and deep breathing exercises at home post-op. Voiced understanding. In agreement with plan of care.

## 2022-05-13 NOTE — ANESTHESIA PREPROCEDURE EVALUATION
05/13/2022  Judie Schmitz is a 41 y.o., female.    Pre-op Assessment    I have reviewed the Patient Summary Reports.    I have reviewed the Nursing Notes. I have reviewed the NPO Status.   I have reviewed the Medications.     Review of Systems  Anesthesia Hx:  History of prior surgery of interest to airway management or planning: Denies Family Hx of Anesthesia complications.   Denies Personal Hx of Anesthesia complications.   Social:  Non-Smoker    Hematology/Oncology:  Hematology Normal   Oncology Normal     EENT/Dental:EENT/Dental Normal   Cardiovascular:  Cardiovascular Normal     Pulmonary:  Pulmonary Normal    Renal/:  Renal/ Normal     Hepatic/GI:  Hepatic/GI Normal IBS   Musculoskeletal:  Musculoskeletal Normal    Neurological:  Neurology Normal Reynaud's   Endocrine:  Endocrine Normal    Dermatological:  Skin Normal    Psych:   Psychiatric History          Physical Exam  General:  Obesity, Well nourished, Cooperative, Alert and Oriented      Airway/Jaw/Neck:  Airway Findings: Mouth Opening: Normal   Mallampati: I TM Distance: Normal, at least 6 cm       Dental:  Dental Findings: In tact           Mental Status:  Mental Status Findings:  Cooperative, Alert and Oriented         Anesthesia Plan  Type of Anesthesia, risks & benefits discussed:  Anesthesia Type:  Gen Supraglottic Airway    Patient's Preference:   Plan Factors:          Intra-op Monitoring Plan: standard ASA monitors  Intra-op Monitoring Plan Comments:   Post Op Pain Control Plan: multimodal analgesia, per primary service following discharge from PACU and IV/PO Opioids PRN  Post Op Pain Control Plan Comments:     Induction:   IV  Beta Blocker:         Informed Consent: Informed consent signed with the Patient and all parties understand the risks and agree with anesthesia plan.  All questions answered.  Anesthesia consent  signed with patient.  ASA Score: 2     Day of Surgery Review of History & Physical: I have interviewed and examined the patient. I have reviewed the patient's H&P dated: 5/13/22.    H&P Update referred to the surgeon/provider.          Ready For Surgery From Anesthesia Perspective.           Physical Exam  General: Obesity, Well nourished, Cooperative, Alert and Oriented    Airway:  Mallampati: I   Mouth Opening: Normal  TM Distance: Normal, at least 6 cm      Dental:  In tact          Anesthesia Plan  Type of Anesthesia, risks & benefits discussed:    Anesthesia Type: Gen Supraglottic Airway  Intra-op Monitoring Plan: standard ASA monitors  Post Op Pain Control Plan: multimodal analgesia, per primary service following discharge from PACU and IV/PO Opioids PRN  Induction:  IV  Informed Consent: Informed consent signed with the Patient and all parties understand the risks and agree with anesthesia plan.  All questions answered. Patient consented to blood products? Yes  ASA Score: 2  Day of Surgery Review of History & Physical: H&P Update referred to the surgeon/provider.I have interviewed and examined the patient. I have reviewed the patient's H&P dated: 5/13/22. There are no significant changes.     Ready For Surgery From Anesthesia Perspective.       .

## 2022-05-13 NOTE — TRANSFER OF CARE
Anesthesia Transfer of Care Note    Patient: Judie Schmitz    Procedure(s) Performed: Procedure(s) (LRB):  HYSTEROSCOPY, WITH DILATION AND CURETTAGE OF UTERUS (N/A)  ABLATION, ENDOMETRIUM, THERMAL (N/A)    Patient location: PACU    Anesthesia Type: general    Transport from OR: Transported from OR on 6-10 L/min O2 by face mask with adequate spontaneous ventilation    Post pain: adequate analgesia    Post assessment: no apparent anesthetic complications and tolerated procedure well    Post vital signs: stable    Level of consciousness: sedated    Nausea/Vomiting: no nausea/vomiting    Complications: none    Transfer of care protocol was followed      Last vitals:   Visit Vitals  BP (!) 148/82   Pulse 83   Resp 18   LMP 04/15/2022 (Exact Date)   SpO2 99%   Breastfeeding No

## 2022-05-13 NOTE — OP NOTE
Preop diagnosis  Breakthrough bleeding [N92.1]  Postcoital bleeding [N93.0]    Postop diagnosis Same    Procedure D&C and ablation and hysteroscopy    Surgeon Jigar Denny M.D.    Asst. None    Estimated blood loss less than 50 cc    Anesthesia MAC    Date 5/13/2022    Procedure in detail    After the appropriate informed consents were obtained and lab work found to be within normal limits the patient was taken to the operating room.  She was placed under MAC sedation and prepped and draped in the usual sterile fashion.  She was placed in to the universal stirrups.  A graves speculum was inserted inside the vagina for visualization of the cervix which was grasped in the anterior portion.  Next the uterus was sounded to 8 Centimeters .  A cervical length of 4 Centimeters was obtained.Next the cervix was dilated open to 8 mm.Leaving a cavity length of 4 centimeters  Next   a sharp curet was passed into the uterus and all surfaces of the uterus were scraped.  A specimen was obtained and sent to pathology.      Following this the NovaSure device was used to obtain a cavity width.  The cavity width was 2.5 Centimeters.  Following this the instrument she was allowed to test.  Once it passed the test it was allowed to ablate.  It ablated at a power of 55 w and for 1 minute and 13 Seconds.  Following this the hysteroscope was used to explore the internal cavity of the uterus.  It appeared to be normal post ablation endometrium     Once the procedure was completed all instruments were removed from the vagina.  The patient was taken out of the universal stirrups.  She was awakened from anesthesia.  She was sent to recovery room in stable condition.    Discharge summary    Once patient has met criteria she will go to [facility] where her recovery will continue.  When she is fully awake, urinating on own and tolerating liquids she be allowed to be discharged home.  She'll be instructed to take Aleve for pain to call with any  problems and to follow up in our office in 2 week.    Discharge Note      SUMMARY     Admit Date: 5/13/2022    Attending Physician: Jigar Denny MD     Discharge Physician: Jigar Denny MD    Discharge Date: 5/13/2022     Reason for Admission:  D&C and ablation    Final Diagnoses:   Principal Problem: Breakthrough bleeding   Secondary Diagnoses:   Active Hospital Problems    Diagnosis  POA    *Breakthrough bleeding [N92.1]  Yes    Postcoital bleeding [N93.0]  Yes      Resolved Hospital Problems   No resolved problems to display.       Disposition: Home or Self Care    Procedures Performed: Procedure(s) (LRB):  HYSTEROSCOPY, WITH DILATION AND CURETTAGE OF UTERUS (N/A)  ABLATION, ENDOMETRIUM, THERMAL (N/A)    Hospital Course:  Patient was admitted underwent D&C and ablation.  Procedure was uncomplicated.  Once patient is fully awake and urinating her own and tolerating liquids she be allowed to be discharged home.    Consults: none    Discharged Condition: good    Patient Instructions:   Current Discharge Medication List      START taking these medications    Details   oxyCODONE-acetaminophen (PERCOCET) 5-325 mg per tablet Take 1 tablet by mouth every 4 (four) hours as needed for Pain.  Qty: 10 tablet, Refills: 0         CONTINUE these medications which have NOT CHANGED    Details   amitriptyline (ELAVIL) 50 MG tablet Take 1 tablet (50 mg total) by mouth every evening.  Qty: 90 tablet, Refills: 3    Comments: This was covered before, is it not covered now?  Epic says not.  Let me know if not, thanks  Associated Diagnoses: Irritable bowel syndrome with diarrhea      ascorbic acid, vitamin C, (VITAMIN C) 1000 MG tablet Take 1,000 mg by mouth once daily.      pantoprazole (PROTONIX) 40 MG tablet Take 1 tablet (40 mg total) by mouth once daily.  Qty: 90 tablet, Refills: 3    Associated Diagnoses: Epigastric pain      zolpidem (AMBIEN) 10 mg Tab TAKE ONE TABLET BY MOUTH NIGHTLY BEFORE BEDTIME AS NEEDED FOR  INSOMNIA  Qty: 30 tablet, Refills: 5    Associated Diagnoses: Insomnia, unspecified type      dextroamphetamine-amphetamine (ADDERALL) 20 mg tablet Take 1 tablet (20 mg total) by mouth 2 (two) times a day.  Qty: 60 tablet, Refills: 0      dicyclomine (BENTYL) 10 MG capsule Take 1 capsule (10 mg total) by mouth 4 (four) times daily as needed.  Qty: 120 capsule, Refills: 5    Associated Diagnoses: Irritable bowel syndrome with diarrhea      meloxicam (MOBIC) 15 MG tablet Take 1 tablet (15 mg total) by mouth once daily.  Qty: 30 tablet, Refills: 1      mv-min/iron/folic/calcium/vitK (WOMEN'S MULTIVITAMIN ORAL) Take by mouth.      segesterone ac-ethin estradioL (ANNOVERA) 0.15-0.013 mg/24 hour Ring PLACE 1 DEVICE VAGINALLY EVERY 28 DAYS.  Qty: 1 each, Refills: 0             Medications:  Reconciled Home Medications:      Medication List      START taking these medications    oxyCODONE-acetaminophen 5-325 mg per tablet  Commonly known as: PERCOCET  Take 1 tablet by mouth every 4 (four) hours as needed for Pain.        CONTINUE taking these medications    amitriptyline 50 MG tablet  Commonly known as: ELAVIL  Take 1 tablet (50 mg total) by mouth every evening.     ANNOVERA 0.15-0.013 mg/24 hour Ring  Generic drug: segesterone ac-ethin estradioL  PLACE 1 DEVICE VAGINALLY EVERY 28 DAYS.     ascorbic acid (vitamin C) 1000 MG tablet  Commonly known as: VITAMIN C  Take 1,000 mg by mouth once daily.     dextroamphetamine-amphetamine 20 mg tablet  Commonly known as: ADDERALL  Take 1 tablet (20 mg total) by mouth 2 (two) times a day.     dicyclomine 10 MG capsule  Commonly known as: BENTYL  Take 1 capsule (10 mg total) by mouth 4 (four) times daily as needed.     meloxicam 15 MG tablet  Commonly known as: MOBIC  Take 1 tablet (15 mg total) by mouth once daily.     pantoprazole 40 MG tablet  Commonly known as: PROTONIX  Take 1 tablet (40 mg total) by mouth once daily.     WOMEN'S MULTIVITAMIN ORAL  Take by mouth.     zolpidem 10  mg Tab  Commonly known as: AMBIEN  TAKE ONE TABLET BY MOUTH NIGHTLY BEFORE BEDTIME AS NEEDED FOR INSOMNIA            Discharge Procedure Orders (must include Diet, Follow-up, Activity)  No discharge procedures on file.      Follow-up Information     Jigar Denny MD Follow up in 2 week(s).    Specialties: Obstetrics, Obstetrics and Gynecology  Why: post op follow up  Contact information:  [place] [id number]  [phone]

## 2022-05-17 LAB
FINAL PATHOLOGIC DIAGNOSIS: NORMAL
GROSS: NORMAL
Lab: NORMAL

## 2022-05-21 ENCOUNTER — PATIENT MESSAGE (OUTPATIENT)
Dept: OBSTETRICS AND GYNECOLOGY | Facility: CLINIC | Age: 42
End: 2022-05-21
Payer: MEDICAID

## 2022-05-24 RX ORDER — DEXTROAMPHETAMINE SACCHARATE, AMPHETAMINE ASPARTATE, DEXTROAMPHETAMINE SULFATE AND AMPHETAMINE SULFATE 5; 5; 5; 5 MG/1; MG/1; MG/1; MG/1
20 TABLET ORAL 2 TIMES DAILY
Qty: 60 TABLET | Refills: 0 | OUTPATIENT
Start: 2022-05-24

## 2022-05-26 RX ORDER — DEXTROAMPHETAMINE SACCHARATE, AMPHETAMINE ASPARTATE, DEXTROAMPHETAMINE SULFATE AND AMPHETAMINE SULFATE 5; 5; 5; 5 MG/1; MG/1; MG/1; MG/1
20 TABLET ORAL 2 TIMES DAILY
Qty: 60 TABLET | Refills: 0 | Status: SHIPPED | OUTPATIENT
Start: 2022-05-26 | End: 2022-07-07 | Stop reason: SDUPTHER

## 2022-05-27 ENCOUNTER — OFFICE VISIT (OUTPATIENT)
Dept: OBSTETRICS AND GYNECOLOGY | Facility: CLINIC | Age: 42
End: 2022-05-27
Payer: MEDICAID

## 2022-05-27 VITALS
RESPIRATION RATE: 16 BRPM | SYSTOLIC BLOOD PRESSURE: 116 MMHG | HEIGHT: 64 IN | DIASTOLIC BLOOD PRESSURE: 68 MMHG | WEIGHT: 179.88 LBS | BODY MASS INDEX: 30.71 KG/M2

## 2022-05-27 DIAGNOSIS — Z09 POSTOPERATIVE FOLLOW-UP: Primary | ICD-10-CM

## 2022-05-27 PROCEDURE — 99024 PR POST-OP FOLLOW-UP VISIT: ICD-10-PCS | Mod: ,,, | Performed by: OBSTETRICS & GYNECOLOGY

## 2022-05-27 PROCEDURE — 1160F PR REVIEW ALL MEDS BY PRESCRIBER/CLIN PHARMACIST DOCUMENTED: ICD-10-PCS | Mod: CPTII,,, | Performed by: OBSTETRICS & GYNECOLOGY

## 2022-05-27 PROCEDURE — 1160F RVW MEDS BY RX/DR IN RCRD: CPT | Mod: CPTII,,, | Performed by: OBSTETRICS & GYNECOLOGY

## 2022-05-27 PROCEDURE — 3078F DIAST BP <80 MM HG: CPT | Mod: CPTII,,, | Performed by: OBSTETRICS & GYNECOLOGY

## 2022-05-27 PROCEDURE — 3074F SYST BP LT 130 MM HG: CPT | Mod: CPTII,,, | Performed by: OBSTETRICS & GYNECOLOGY

## 2022-05-27 PROCEDURE — 99999 PR PBB SHADOW E&M-EST. PATIENT-LVL III: CPT | Mod: PBBFAC,,, | Performed by: OBSTETRICS & GYNECOLOGY

## 2022-05-27 PROCEDURE — 3074F PR MOST RECENT SYSTOLIC BLOOD PRESSURE < 130 MM HG: ICD-10-PCS | Mod: CPTII,,, | Performed by: OBSTETRICS & GYNECOLOGY

## 2022-05-27 PROCEDURE — 1159F MED LIST DOCD IN RCRD: CPT | Mod: CPTII,,, | Performed by: OBSTETRICS & GYNECOLOGY

## 2022-05-27 PROCEDURE — 99024 POSTOP FOLLOW-UP VISIT: CPT | Mod: ,,, | Performed by: OBSTETRICS & GYNECOLOGY

## 2022-05-27 PROCEDURE — 99999 PR PBB SHADOW E&M-EST. PATIENT-LVL III: ICD-10-PCS | Mod: PBBFAC,,, | Performed by: OBSTETRICS & GYNECOLOGY

## 2022-05-27 PROCEDURE — 3008F BODY MASS INDEX DOCD: CPT | Mod: CPTII,,, | Performed by: OBSTETRICS & GYNECOLOGY

## 2022-05-27 PROCEDURE — 3008F PR BODY MASS INDEX (BMI) DOCUMENTED: ICD-10-PCS | Mod: CPTII,,, | Performed by: OBSTETRICS & GYNECOLOGY

## 2022-05-27 PROCEDURE — 1159F PR MEDICATION LIST DOCUMENTED IN MEDICAL RECORD: ICD-10-PCS | Mod: CPTII,,, | Performed by: OBSTETRICS & GYNECOLOGY

## 2022-05-27 PROCEDURE — 99213 OFFICE O/P EST LOW 20 MIN: CPT | Mod: PBBFAC | Performed by: OBSTETRICS & GYNECOLOGY

## 2022-05-27 PROCEDURE — 3078F PR MOST RECENT DIASTOLIC BLOOD PRESSURE < 80 MM HG: ICD-10-PCS | Mod: CPTII,,, | Performed by: OBSTETRICS & GYNECOLOGY

## 2022-05-27 NOTE — PROGRESS NOTES
Subjective:       Patient ID: Judie Schmitz is a 41 y.o. female.    Chief Complaint:  Post-op Evaluation      History of Present Illness  Patient presents for 2 week follow-up from D&C and ablation.  Pathology was benign.  Patient reports almost no continued bleeding and almost no pain or discomfort.  Patient will be cleared to resume all activity.    Menstrual History:  OB History        2    Para   2    Term   2            AB        Living   2       SAB        IAB        Ectopic        Multiple        Live Births                    Menarche age:  No LMP recorded.         Review of Systems  Review of Systems   Constitutional: Negative for activity change, appetite change, chills, diaphoresis, fatigue, fever and unexpected weight change.   HENT: Negative for congestion, dental problem, drooling, ear discharge, ear pain, facial swelling, hearing loss, mouth sores, nosebleeds, postnasal drip, rhinorrhea, sinus pressure, sneezing, sore throat, tinnitus, trouble swallowing and voice change.    Eyes: Negative for photophobia, pain, discharge, redness, itching and visual disturbance.   Respiratory: Negative for apnea, cough, choking, chest tightness, shortness of breath, wheezing and stridor.    Cardiovascular: Negative for chest pain, palpitations and leg swelling.   Gastrointestinal: Negative for abdominal distention, abdominal pain, anal bleeding, blood in stool, constipation, diarrhea, nausea, rectal pain and vomiting.   Endocrine: Positive for cold intolerance. Negative for heat intolerance, polydipsia, polyphagia and polyuria.   Genitourinary: Positive for vaginal discharge. Negative for decreased urine volume, difficulty urinating, dyspareunia, dysuria, enuresis, flank pain, frequency, genital sores, hematuria, menstrual problem, pelvic pain, urgency, vaginal bleeding and vaginal pain.   Musculoskeletal: Negative for arthralgias, back pain, gait problem, joint swelling, myalgias, neck pain  and neck stiffness.   Skin: Negative for color change, pallor, rash and wound.   Allergic/Immunologic: Negative for environmental allergies, food allergies and immunocompromised state.   Neurological: Negative for dizziness, tremors, seizures, syncope, facial asymmetry, speech difficulty, weakness, light-headedness, numbness and headaches.   Hematological: Negative for adenopathy. Does not bruise/bleed easily.   Psychiatric/Behavioral: Negative for agitation, behavioral problems, confusion, decreased concentration, dysphoric mood, hallucinations, self-injury, sleep disturbance and suicidal ideas. The patient is not nervous/anxious and is not hyperactive.            Objective:      Physical Exam  Vitals and nursing note reviewed.             Assessment:        1. Postoperative follow-up                Plan:         Judie was seen today for post-op evaluation.    Diagnoses and all orders for this visit:    Postoperative follow-up

## 2022-06-09 ENCOUNTER — TELEPHONE (OUTPATIENT)
Dept: SLEEP MEDICINE | Facility: OTHER | Age: 42
End: 2022-06-09
Payer: MEDICAID

## 2022-06-10 ENCOUNTER — TELEPHONE (OUTPATIENT)
Dept: SLEEP MEDICINE | Facility: HOSPITAL | Age: 42
End: 2022-06-10
Payer: MEDICAID

## 2022-06-10 ENCOUNTER — TELEPHONE (OUTPATIENT)
Dept: SLEEP MEDICINE | Facility: OTHER | Age: 42
End: 2022-06-10
Payer: MEDICAID

## 2022-06-10 NOTE — TELEPHONE ENCOUNTER
Patient canceled the home sleep study for June 10th,she wants to reschedule on the [facility]. I gave her their number.

## 2022-06-13 ENCOUNTER — TELEPHONE (OUTPATIENT)
Dept: SLEEP MEDICINE | Facility: HOSPITAL | Age: 42
End: 2022-06-13
Payer: MEDICAID

## 2022-06-15 DIAGNOSIS — G47.00 INSOMNIA, UNSPECIFIED TYPE: ICD-10-CM

## 2022-06-15 RX ORDER — ZOLPIDEM TARTRATE 10 MG/1
TABLET ORAL
Qty: 30 TABLET | Refills: 5 | Status: SHIPPED | OUTPATIENT
Start: 2022-06-15 | End: 2022-12-29 | Stop reason: SDUPTHER

## 2022-06-20 ENCOUNTER — PATIENT MESSAGE (OUTPATIENT)
Dept: PODIATRY | Facility: CLINIC | Age: 42
End: 2022-06-20
Payer: MEDICAID

## 2022-07-01 RX ORDER — DEXTROAMPHETAMINE SACCHARATE, AMPHETAMINE ASPARTATE, DEXTROAMPHETAMINE SULFATE AND AMPHETAMINE SULFATE 5; 5; 5; 5 MG/1; MG/1; MG/1; MG/1
20 TABLET ORAL 2 TIMES DAILY
Qty: 60 TABLET | Refills: 0 | OUTPATIENT
Start: 2022-07-01

## 2022-07-06 RX ORDER — DEXTROAMPHETAMINE SACCHARATE, AMPHETAMINE ASPARTATE, DEXTROAMPHETAMINE SULFATE AND AMPHETAMINE SULFATE 5; 5; 5; 5 MG/1; MG/1; MG/1; MG/1
20 TABLET ORAL 2 TIMES DAILY
Qty: 60 TABLET | Refills: 0 | OUTPATIENT
Start: 2022-07-06

## 2022-07-07 ENCOUNTER — PATIENT MESSAGE (OUTPATIENT)
Dept: PSYCHIATRY | Facility: CLINIC | Age: 42
End: 2022-07-07
Payer: MEDICAID

## 2022-07-07 RX ORDER — DEXTROAMPHETAMINE SACCHARATE, AMPHETAMINE ASPARTATE, DEXTROAMPHETAMINE SULFATE AND AMPHETAMINE SULFATE 5; 5; 5; 5 MG/1; MG/1; MG/1; MG/1
20 TABLET ORAL 2 TIMES DAILY
Qty: 60 TABLET | Refills: 0 | Status: SHIPPED | OUTPATIENT
Start: 2022-07-07 | End: 2022-09-14

## 2022-07-13 ENCOUNTER — PATIENT MESSAGE (OUTPATIENT)
Dept: OBSTETRICS AND GYNECOLOGY | Facility: CLINIC | Age: 42
End: 2022-07-13
Payer: MEDICAID

## 2022-07-14 RX ORDER — FLUCONAZOLE 150 MG/1
150 TABLET ORAL DAILY
Qty: 1 TABLET | Refills: 0 | Status: SHIPPED | OUTPATIENT
Start: 2022-07-14 | End: 2022-07-16

## 2022-07-14 NOTE — TELEPHONE ENCOUNTER
Pt has c/o vaginal discharge with itching and is requesting medication for a yeast infection, pt's last ov was 5/2022. Please advise.

## 2022-07-15 ENCOUNTER — PATIENT MESSAGE (OUTPATIENT)
Dept: PSYCHIATRY | Facility: CLINIC | Age: 42
End: 2022-07-15
Payer: MEDICAID

## 2022-07-15 ENCOUNTER — OFFICE VISIT (OUTPATIENT)
Dept: PSYCHIATRY | Facility: CLINIC | Age: 42
End: 2022-07-15
Payer: MEDICAID

## 2022-07-15 DIAGNOSIS — F90.0 ADHD (ATTENTION DEFICIT HYPERACTIVITY DISORDER), INATTENTIVE TYPE: Primary | ICD-10-CM

## 2022-07-15 DIAGNOSIS — F41.1 GAD (GENERALIZED ANXIETY DISORDER): ICD-10-CM

## 2022-07-15 PROCEDURE — 90833 PSYTX W PT W E/M 30 MIN: CPT | Mod: SA,HB,95, | Performed by: NURSE PRACTITIONER

## 2022-07-15 PROCEDURE — 99214 OFFICE O/P EST MOD 30 MIN: CPT | Mod: SA,HB,95, | Performed by: NURSE PRACTITIONER

## 2022-07-15 PROCEDURE — 99214 PR OFFICE/OUTPT VISIT, EST, LEVL IV, 30-39 MIN: ICD-10-PCS | Mod: SA,HB,95, | Performed by: NURSE PRACTITIONER

## 2022-07-15 PROCEDURE — 90833 PR PSYCHOTHERAPY W/PATIENT W/E&M, 30 MIN (ADD ON): ICD-10-PCS | Mod: SA,HB,95, | Performed by: NURSE PRACTITIONER

## 2022-07-15 RX ORDER — DEXTROAMPHETAMINE SACCHARATE, AMPHETAMINE ASPARTATE, DEXTROAMPHETAMINE SULFATE AND AMPHETAMINE SULFATE 2.5; 2.5; 2.5; 2.5 MG/1; MG/1; MG/1; MG/1
1 TABLET ORAL 4 TIMES DAILY
Qty: 120 TABLET | Refills: 0 | Status: SHIPPED | OUTPATIENT
Start: 2022-07-15 | End: 2022-09-14

## 2022-07-15 NOTE — PROGRESS NOTES
"7/15/2022 7:39 AM   Judie Schmitz   1980   [id number]           OUTPATIENT PSYCHIATRY FOLLOW- UP VISIT    Reason for Encounter:  Judie Schmitz, a 42 y.o. female,who presents today for follow up of ADHD, anxiety. Met with patient.    Interval History and Content of Current Session:    Today,  Pt with hx of IBS (managed with Bentyl, Elavil) reports today as follow up from previous visit about 5 months prior. Reports doing well lately. Has been dating someone and this has been a good situation, "I didn't realize that about it, that you didn't need to work so hard, its just easy."    Reports working three jobs currently about 15 hour days. Reports is noticing Adderall isn't lasting as long. Reports will take one when first waking up, then another in the late morning (sometimes will split second dose in late morning and in early afternoon).     Discussed trial of Adderall 10 mg QID to get more extended time with medication.     Have been rescheduling a colonoscopy and sleep study "for I don't know how many months." Have colonscopy scheduled.     Started growing a garden as a therapeutic tool, "tomatoes out of my ears."     Has been meeting with Danica Kendall online for a few therapy sessions. [email or web address]                  Psychosocial stressors: financial, social, occupational pressures.       Review Of Systems:     Medical Review Of Systems:  Constitutional: negative for fatigue, fevers and night sweats  Cardiovascular: negative for chest pain, palpitations and syncope  Gastrointestinal: negative for abdominal pain, constipation, diarrhea, nausea and vomiting    Psychiatric Review Of Systems:  sleep: no  appetite changes: no  weight changes: no  energy/anergy: no  interest/pleasure/anhedonia: no  somatic symptoms: no  libido: no  anxiety/panic: no  guilty/hopeless: no  S.I.B.s/risky behavior: no  any drugs: no  alcohol: no      Sleep: 4 to 7 hours per night, has sleep " study      Risk Parameters:  Patient reports no suicidal ideation  Patient reports no homicidal ideation  Patient reports no self-injurious behavior  Patient reports no violent behavior      Current meds- Elavil (per rheumatology), Adderall, zolpidem (per sleep medicine)    Compliance: no    Side effects: None      Psychiatric, social, and family history reviewed this visit    PSYCHOTHERAPY ADD-ON [id number]   16-37 minutes    Site: [facility]  Time: 20 minutes  Participants: Met with patient    Therapeutic Intervention Type: insight oriented psychotherapy, supportive psychotherapy  Why chosen therapy is appropriate versus another modality: relevant to diagnosis    Target symptoms: depression, anxiety , adjustment  Primary focus: new relationship  Psychotherapeutic techniques: anxiety, adjustment    Outcome monitoring methods: self-report    Patient's response to intervention:  The patient's response to intervention is accepting.    Progress toward goals:  The patient's progress toward goals is good.          Objective     ALL MEDICATIONS:    Current Outpatient Medications:     amitriptyline (ELAVIL) 50 MG tablet, Take 1 tablet (50 mg total) by mouth every evening., Disp: 90 tablet, Rfl: 3    ascorbic acid, vitamin C, (VITAMIN C) 1000 MG tablet, Take 1,000 mg by mouth once daily., Disp: , Rfl:     dextroamphetamine-amphetamine (ADDERALL) 20 mg tablet, Take 1 tablet (20 mg total) by mouth 2 (two) times a day., Disp: 60 tablet, Rfl: 0    dicyclomine (BENTYL) 10 MG capsule, Take 1 capsule (10 mg total) by mouth 4 (four) times daily as needed., Disp: 120 capsule, Rfl: 5    fluconazole (DIFLUCAN) 150 MG Tab, Take 1 tablet (150 mg total) by mouth once daily. for 1 day, Disp: 1 tablet, Rfl: 0    meloxicam (MOBIC) 15 MG tablet, Take 1 tablet (15 mg total) by mouth once daily., Disp: 30 tablet, Rfl: 1    methylPREDNISolone (MEDROL DOSEPACK) 4 mg tablet, use as directed, Disp: 21 each, Rfl: 0     mv-min/iron/folic/calcium/vitK (WOMEN'S MULTIVITAMIN ORAL), Take by mouth., Disp: , Rfl:     oxyCODONE-acetaminophen (PERCOCET) 5-325 mg per tablet, Take 1 tablet by mouth every 4 (four) hours as needed for Pain., Disp: 10 tablet, Rfl: 0    pantoprazole (PROTONIX) 40 MG tablet, Take 1 tablet (40 mg total) by mouth once daily., Disp: 90 tablet, Rfl: 3    segesterone ac-ethin estradioL (ANNOVERA) 0.15-0.013 mg/24 hour Ring, PLACE 1 DEVICE VAGINALLY EVERY 28 DAYS., Disp: 1 each, Rfl: 0    zolpidem (AMBIEN) 10 mg Tab, TAKE ONE TABLET BY MOUTH NIGHTLY BEFORE BEDTIME AS NEEDED FOR INSOMNIA, Disp: 30 tablet, Rfl: 5    ALLERGIES:  Review of patient's allergies indicates:   Allergen Reactions    Tetanus vaccines and toxoid        RELEVANT LABS/STUDIES:    Lab Results   Component Value Date    WBC 7.33 08/27/2020    HGB 12.8 08/27/2020    HCT 40.1 08/27/2020    MCV 87 08/27/2020     (H) 08/27/2020     BMP  Lab Results   Component Value Date     11/05/2019    K 4.5 11/05/2019     11/05/2019    CO2 24 11/05/2019    BUN 12 11/05/2019    CREATININE 0.66 11/05/2019    CALCIUM 9.0 11/05/2019    ANIONGAP 7 (L) 11/05/2019    ESTGFRAFRICA >60.0 11/05/2019    EGFRNONAA >60.0 11/05/2019     Lab Results   Component Value Date    ALT 14 11/05/2019    AST 19 11/05/2019    ALKPHOS 76 11/05/2019    BILITOT <0.1 (A) 11/05/2019     Lab Results   Component Value Date    TSH 2.270 02/15/2018     No results found for: LABA1C, HGBA1C    Constitutional  Vitals:  Most recent vital signs, dated less than 90 days prior to this appointment, were reviewed.    There were no vitals filed for this visit.         PHYSICAL EXAM  General: well developed, well nourished  Neurologic:   Gait: Normal   Psychomotor signs:  No involuntary movements or tremor  AIMS: none    PSYCHIATRIC EXAM:     Mental Status Exam:  Appearance: unremarkable, age appropriate  Behavior/Cooperation: normal, cooperative  Speech: normal tone, normal rate, normal  pitch, normal volume  Language: uses words appropriately; NO aphasia or dysarthria  Mood: steady  Affect: full, congruent with mood and appropriate to content/situation  Thought Process: normal and logical  Thought Content: normal, no suicidality, no homicidality, delusions, or paranoia  Level of Consciousness: Alert and Oriented x3  Memory:  Intact  Attention/concentration: appropriate for age/education.   Fund of Knowledge: appears adequate  Insight:  Intact  Judgment: Intact     Assessment and Diagnosis   Status/Progress: Based on the examination today, the patient's problem(s) is/are well controlled.  New problems have been presented today.   Co-morbidities are complicating management of the primary condition.  There are no active rule-out diagnoses for this patient at this time.     General Impression:   IVANA   ADHD, inattentive type  Rule out Sleep apnea      Intervention/Counseling/Treatment Plan   · Medication Management: -        Continue Adderall 10 mg by mouth four daily        Continue clonazepam 0.5 mg by mouth once daily as needed        Continue Elavil 50 mg by mouth once daily per rheumatology        Continue Ambien 5 mg by mouth once daily per sleep medicine  · Labs: reviewed most recent  · The treatment plan and follow up plan were reviewed with the patient.  · Discussed with patient informed consent, risks vs. benefits, alternative treatments, side effect profile and the inherent unpredictability of individual responses to these treatments. The patient expresses understanding of the above and displays the capacity to agree with this current plan and had no other questions.  · Encouraged Patient to keep future appointments.   · Take medications as prescribed and abstain from substance abuse.   · In the event of an emergency patient was advised to go to the emergency room.    Return to Clinic: 6 weeks, 1 month    > than 50% of total time spend on coordination of care and counseling   (which included  pts differential diagnosis and prognosis for psychiatric conditions, risks, benefits of treatments, instructions and adherence to treatment plan, risk reduction, reviewing current psychiatric medication regimen, medical problems and social stressors. In addtion to possible discussion with other healthcare provider/s)    Add on Psychotherapy time:0  Total Face time: 35 min    The patient location is: [place], at home  The chief complaint leading to consultation is: med f/u    Visit type: audiovisual    Face to Face time with patient: 35 min  40 minutes of total time spent on the encounter, which includes face to face time and non-face to face time preparing to see the patient (eg, review of tests), Obtaining and/or reviewing separately obtained history, Documenting clinical information in the electronic or other health record, Independently interpreting results (not separately reported) and communicating results to the patient/family/caregiver, or Care coordination (not separately reported).         Each patient to whom he or she provides medical services by telemedicine is:  (1) informed of the relationship between the physician and patient and the respective role of any other health care provider with respect to management of the patient; and (2) notified that he or she may decline to receive medical services by telemedicine and may withdraw from such care at any time.    Notes:     Eliecer Trinidad, MSN, APRN, PMHNP-BC [facility]   7/15/2022 7:39 AM

## 2022-07-19 ENCOUNTER — PATIENT MESSAGE (OUTPATIENT)
Dept: OBSTETRICS AND GYNECOLOGY | Facility: CLINIC | Age: 42
End: 2022-07-19
Payer: MEDICAID

## 2022-07-21 RX ORDER — CLONAZEPAM 0.5 MG/1
0.5 TABLET, ORALLY DISINTEGRATING ORAL NIGHTLY PRN
Qty: 30 TABLET | Refills: 0 | Status: SHIPPED | OUTPATIENT
Start: 2022-07-21 | End: 2022-11-04 | Stop reason: SDUPTHER

## 2022-07-27 ENCOUNTER — PATIENT MESSAGE (OUTPATIENT)
Dept: OBSTETRICS AND GYNECOLOGY | Facility: CLINIC | Age: 42
End: 2022-07-27
Payer: MEDICAID

## 2022-07-27 RX ORDER — ETONOGESTREL AND ETHINYL ESTRADIOL VAGINAL RING .015; .12 MG/D; MG/D
1 RING VAGINAL
Qty: 1 EACH | Refills: 3 | Status: SHIPPED | OUTPATIENT
Start: 2022-07-27 | End: 2023-02-24 | Stop reason: SDUPTHER

## 2022-07-27 NOTE — TELEPHONE ENCOUNTER
Pt was suppose to have Nuva Ring sent in on the 19th and it wasn't, can you please send it in. Thank you

## 2022-07-28 RX ORDER — ETONOGESTREL AND ETHINYL ESTRADIOL VAGINAL RING .015; .12 MG/D; MG/D
1 RING VAGINAL
Qty: 1 EACH | Refills: 2 | Status: SHIPPED | OUTPATIENT
Start: 2022-07-28 | End: 2023-01-30 | Stop reason: SDUPTHER

## 2022-08-02 ENCOUNTER — PATIENT MESSAGE (OUTPATIENT)
Dept: GASTROENTEROLOGY | Facility: CLINIC | Age: 42
End: 2022-08-02
Payer: MEDICAID

## 2022-09-13 ENCOUNTER — PATIENT MESSAGE (OUTPATIENT)
Dept: PSYCHIATRY | Facility: CLINIC | Age: 42
End: 2022-09-13
Payer: MEDICAID

## 2022-09-13 RX ORDER — DEXTROAMPHETAMINE SACCHARATE, AMPHETAMINE ASPARTATE, DEXTROAMPHETAMINE SULFATE AND AMPHETAMINE SULFATE 5; 5; 5; 5 MG/1; MG/1; MG/1; MG/1
20 TABLET ORAL 2 TIMES DAILY
Qty: 60 TABLET | Refills: 0 | Status: CANCELLED | OUTPATIENT
Start: 2022-09-13

## 2022-09-14 RX ORDER — DEXTROAMPHETAMINE SACCHARATE, AMPHETAMINE ASPARTATE, DEXTROAMPHETAMINE SULFATE AND AMPHETAMINE SULFATE 5; 5; 5; 5 MG/1; MG/1; MG/1; MG/1
20 TABLET ORAL 2 TIMES DAILY
Qty: 75 TABLET | Refills: 0 | Status: SHIPPED | OUTPATIENT
Start: 2022-09-14 | End: 2022-09-15 | Stop reason: SDUPTHER

## 2022-09-15 RX ORDER — DEXTROAMPHETAMINE SACCHARATE, AMPHETAMINE ASPARTATE, DEXTROAMPHETAMINE SULFATE AND AMPHETAMINE SULFATE 5; 5; 5; 5 MG/1; MG/1; MG/1; MG/1
20 TABLET ORAL 2 TIMES DAILY
Qty: 75 TABLET | Refills: 0 | Status: SHIPPED | OUTPATIENT
Start: 2022-10-13 | End: 2022-10-18

## 2022-09-15 RX ORDER — DEXTROAMPHETAMINE SACCHARATE, AMPHETAMINE ASPARTATE, DEXTROAMPHETAMINE SULFATE AND AMPHETAMINE SULFATE 5; 5; 5; 5 MG/1; MG/1; MG/1; MG/1
20 TABLET ORAL 2 TIMES DAILY
Qty: 75 TABLET | Refills: 0 | Status: SHIPPED | OUTPATIENT
Start: 2022-09-15 | End: 2022-10-18 | Stop reason: SDUPTHER

## 2022-09-15 RX ORDER — DEXTROAMPHETAMINE SACCHARATE, AMPHETAMINE ASPARTATE, DEXTROAMPHETAMINE SULFATE AND AMPHETAMINE SULFATE 5; 5; 5; 5 MG/1; MG/1; MG/1; MG/1
20 TABLET ORAL 2 TIMES DAILY
Qty: 75 TABLET | Refills: 0 | Status: SHIPPED | OUTPATIENT
Start: 2022-11-11 | End: 2022-10-18

## 2022-10-18 ENCOUNTER — OFFICE VISIT (OUTPATIENT)
Dept: PSYCHIATRY | Facility: CLINIC | Age: 42
End: 2022-10-18
Payer: MEDICAID

## 2022-10-18 DIAGNOSIS — F90.0 ADHD (ATTENTION DEFICIT HYPERACTIVITY DISORDER), INATTENTIVE TYPE: Primary | ICD-10-CM

## 2022-10-18 DIAGNOSIS — F41.1 GAD (GENERALIZED ANXIETY DISORDER): ICD-10-CM

## 2022-10-18 PROCEDURE — 99214 OFFICE O/P EST MOD 30 MIN: CPT | Mod: S$PBB,SA,HB,95 | Performed by: NURSE PRACTITIONER

## 2022-10-18 PROCEDURE — 99214 PR OFFICE/OUTPT VISIT, EST, LEVL IV, 30-39 MIN: ICD-10-PCS | Mod: S$PBB,SA,HB,95 | Performed by: NURSE PRACTITIONER

## 2022-10-18 PROCEDURE — 99999 PR PBB SHADOW E&M-EST. PATIENT-LVL I: CPT | Mod: PBBFAC,SA,HB, | Performed by: NURSE PRACTITIONER

## 2022-10-18 PROCEDURE — 99999 PR PBB SHADOW E&M-EST. PATIENT-LVL I: ICD-10-PCS | Mod: PBBFAC,SA,HB, | Performed by: NURSE PRACTITIONER

## 2022-10-18 PROCEDURE — 99211 OFF/OP EST MAY X REQ PHY/QHP: CPT | Mod: PBBFAC | Performed by: NURSE PRACTITIONER

## 2022-10-18 RX ORDER — DEXTROAMPHETAMINE SACCHARATE, AMPHETAMINE ASPARTATE, DEXTROAMPHETAMINE SULFATE AND AMPHETAMINE SULFATE 5; 5; 5; 5 MG/1; MG/1; MG/1; MG/1
TABLET ORAL
Qty: 75 TABLET | Refills: 0 | Status: SHIPPED | OUTPATIENT
Start: 2022-11-16 | End: 2023-01-23

## 2022-10-18 RX ORDER — DEXTROAMPHETAMINE SACCHARATE, AMPHETAMINE ASPARTATE, DEXTROAMPHETAMINE SULFATE AND AMPHETAMINE SULFATE 5; 5; 5; 5 MG/1; MG/1; MG/1; MG/1
20 TABLET ORAL 2 TIMES DAILY
Qty: 75 TABLET | Refills: 0 | Status: SHIPPED | OUTPATIENT
Start: 2022-12-09 | End: 2022-10-18

## 2022-10-18 RX ORDER — DEXTROAMPHETAMINE SACCHARATE, AMPHETAMINE ASPARTATE, DEXTROAMPHETAMINE SULFATE AND AMPHETAMINE SULFATE 5; 5; 5; 5 MG/1; MG/1; MG/1; MG/1
TABLET ORAL
Qty: 75 TABLET | Refills: 0 | Status: SHIPPED | OUTPATIENT
Start: 2022-12-15 | End: 2023-01-23

## 2022-10-18 RX ORDER — DEXTROAMPHETAMINE SACCHARATE, AMPHETAMINE ASPARTATE, DEXTROAMPHETAMINE SULFATE AND AMPHETAMINE SULFATE 5; 5; 5; 5 MG/1; MG/1; MG/1; MG/1
TABLET ORAL
Qty: 75 TABLET | Refills: 0 | Status: SHIPPED | OUTPATIENT
Start: 2022-10-18 | End: 2023-01-23

## 2022-10-18 NOTE — PROGRESS NOTES
"10/18/2022 11:30 AM   Judie Schmitz   1980   [id number]                                                                   OUTPATIENT PSYCHIATRY FOLLOW- UP VISIT     Reason for Encounter:  Judie Schmitz, a 42 y.o. female,who presents today for follow up of ADHD, anxiety. Met with patient.     Interval History and Content of Current Session:     Today,  Pt with hx of IBS (managed with Bentyl, Elavil) reports today as follow up from previous visit about 5 months prior. Reports doing well lately. Reports had back issues, "hit a brick wall with pain, bending over hasn't been the easiest."      Started growing a garden as a therapeutic tool, "tomatoes out of my ears." Will start working back with [facility] [employer] or part-time. 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     Reports has been taking Adderall 30 mg in the morning (1.5) and then 20 mg in the afternoon. Has continued to be effective with this regimen with no side effects, denies sleep or appetite issues.     Has been meeting with Danica Kendall online for a few therapy sessions. [email or web address]                    Psychosocial stressors: financial, social, occupational pressures.         Review Of Systems:      Medical Review Of Systems:  Constitutional: negative for fatigue, fevers and night sweats  Cardiovascular: negative for chest pain, palpitations and syncope  Gastrointestinal: negative for abdominal pain, constipation, diarrhea, nausea and vomiting     Psychiatric Review Of Systems:  sleep: no  appetite changes: no  weight changes: no  energy/anergy: no  interest/pleasure/anhedonia: no  somatic symptoms: no  libido: no  anxiety/panic: no  guilty/hopeless: no  S.I.B.s/risky behavior: no  any drugs: no  alcohol: no       Sleep: 4 to 7 hours per night, has sleep study        Risk Parameters:  Patient reports no suicidal ideation  Patient reports no homicidal " ideation  Patient reports no self-injurious behavior  Patient reports no violent behavior        Current meds- Elavil (per rheumatology), Adderall, zolpidem (per sleep medicine)     Compliance: no     Side effects: None        Psychiatric, social, and family history reviewed this visit    Objective      ALL MEDICATIONS:     Current Outpatient Medications:     amitriptyline (ELAVIL) 50 MG tablet, Take 1 tablet (50 mg total) by mouth every evening., Disp: 90 tablet, Rfl: 3    ascorbic acid, vitamin C, (VITAMIN C) 1000 MG tablet, Take 1,000 mg by mouth once daily., Disp: , Rfl:     dextroamphetamine-amphetamine (ADDERALL) 20 mg tablet, Take 1 tablet (20 mg total) by mouth 2 (two) times a day., Disp: 60 tablet, Rfl: 0    dicyclomine (BENTYL) 10 MG capsule, Take 1 capsule (10 mg total) by mouth 4 (four) times daily as needed., Disp: 120 capsule, Rfl: 5    fluconazole (DIFLUCAN) 150 MG Tab, Take 1 tablet (150 mg total) by mouth once daily. for 1 day, Disp: 1 tablet, Rfl: 0    meloxicam (MOBIC) 15 MG tablet, Take 1 tablet (15 mg total) by mouth once daily., Disp: 30 tablet, Rfl: 1    methylPREDNISolone (MEDROL DOSEPACK) 4 mg tablet, use as directed, Disp: 21 each, Rfl: 0    mv-min/iron/folic/calcium/vitK (WOMEN'S MULTIVITAMIN ORAL), Take by mouth., Disp: , Rfl:     oxyCODONE-acetaminophen (PERCOCET) 5-325 mg per tablet, Take 1 tablet by mouth every 4 (four) hours as needed for Pain., Disp: 10 tablet, Rfl: 0    pantoprazole (PROTONIX) 40 MG tablet, Take 1 tablet (40 mg total) by mouth once daily., Disp: 90 tablet, Rfl: 3    segesterone ac-ethin estradioL (ANNOVERA) 0.15-0.013 mg/24 hour Ring, PLACE 1 DEVICE VAGINALLY EVERY 28 DAYS., Disp: 1 each, Rfl: 0    zolpidem (AMBIEN) 10 mg Tab, TAKE ONE TABLET BY MOUTH NIGHTLY BEFORE BEDTIME AS NEEDED FOR INSOMNIA, Disp: 30 tablet, Rfl: 5     ALLERGIES:       Review of patient's allergies indicates:   Allergen Reactions    Tetanus vaccines and toxoid           RELEVANT LABS/STUDIES:           Lab Results   Component Value Date     WBC 7.33 08/27/2020     HGB 12.8 08/27/2020     HCT 40.1 08/27/2020     MCV 87 08/27/2020      (H) 08/27/2020      BMP        Lab Results   Component Value Date      11/05/2019     K 4.5 11/05/2019      11/05/2019     CO2 24 11/05/2019     BUN 12 11/05/2019     CREATININE 0.66 11/05/2019     CALCIUM 9.0 11/05/2019     ANIONGAP 7 (L) 11/05/2019     ESTGFRAFRICA >60.0 11/05/2019     EGFRNONAA >60.0 11/05/2019            Lab Results   Component Value Date     ALT 14 11/05/2019     AST 19 11/05/2019     ALKPHOS 76 11/05/2019     BILITOT <0.1 (A) 11/05/2019            Lab Results   Component Value Date     TSH 2.270 02/15/2018      No results found for: LABA1C, HGBA1C     Constitutional  Vitals:  Most recent vital signs, dated less than 90 days prior to this appointment, were reviewed.    There were no vitals filed for this visit.            PHYSICAL EXAM  General: well developed, well nourished  Neurologic:   Gait: Normal   Psychomotor signs:  No involuntary movements or tremor  AIMS: none     PSYCHIATRIC EXAM:     Mental Status Exam:  Appearance: unremarkable, age appropriate  Behavior/Cooperation: normal, cooperative  Speech: normal tone, normal rate, normal pitch, normal volume  Language: uses words appropriately; NO aphasia or dysarthria  Mood: steady  Affect: full, congruent with mood and appropriate to content/situation  Thought Process: normal and logical  Thought Content: normal, no suicidality, no homicidality, delusions, or paranoia  Level of Consciousness: Alert and Oriented x3  Memory:  Intact  Attention/concentration: appropriate for age/education.   Fund of Knowledge: appears adequate  Insight:  Intact  Judgment: Intact      Assessment and Diagnosis   Status/Progress: Based on the examination today, the patient's problem(s) is/are well controlled.  New problems have been presented today.   Co-morbidities are complicating management of the  primary condition.  There are no active rule-out diagnoses for this patient at this time.      General Impression:   IVANA   ADHD, inattentive type  Rule out Sleep apnea        Intervention/Counseling/Treatment Plan   Medication Management: -        Continue Adderall 10 mg by mouth four daily (30 mg in the am and 20 mg in the pm)        Continue clonazepam 0.5 mg by mouth once daily as needed        Continue Elavil 50 mg by mouth once daily per rheumatology        Continue Ambien 5 mg by mouth once daily per sleep medicine  Labs: reviewed most recent  The treatment plan and follow up plan were reviewed with the patient.  Discussed with patient informed consent, risks vs. benefits, alternative treatments, side effect profile and the inherent unpredictability of individual responses to these treatments. The patient expresses understanding of the above and displays the capacity to agree with this current plan and had no other questions.  Encouraged Patient to keep future appointments.   Take medications as prescribed and abstain from substance abuse.   In the event of an emergency patient was advised to go to the emergency room.     Return to Clinic: 6 weeks, 1 month     > than 50% of total time spend on coordination of care and counseling   (which included pts differential diagnosis and prognosis for psychiatric conditions, risks, benefits of treatments, instructions and adherence to treatment plan, risk reduction, reviewing current psychiatric medication regimen, medical problems and social stressors. In addtion to possible discussion with other healthcare provider/s)     Add on Psychotherapy time:0  Total Face time: 35 min     The patient location is: [place], at home  The chief complaint leading to consultation is: med f/u     Visit type: audiovisual     Face to Face time with patient: 35 min  40 minutes of total time spent on the encounter, which includes face to face time and non-face to face time preparing to  see the patient (eg, review of tests), Obtaining and/or reviewing separately obtained history, Documenting clinical information in the electronic or other health record, Independently interpreting results (not separately reported) and communicating results to the patient/family/caregiver, or Care coordination (not separately reported).            Each patient to whom he or she provides medical services by telemedicine is:  (1) informed of the relationship between the physician and patient and the respective role of any other health care provider with respect to management of the patient; and (2) notified that he or she may decline to receive medical services by telemedicine and may withdraw from such care at any time.     Notes:      Eliecer Trinidad, MSN, APRN, PMHNP-BC  [facility]   10/18/2022 11:30 AM

## 2022-10-22 ENCOUNTER — HOSPITAL ENCOUNTER (EMERGENCY)
Facility: HOSPITAL | Age: 42
Discharge: HOME | End: 2022-10-22
Attending: SURGERY
Payer: MEDICAID

## 2022-10-22 VITALS
DIASTOLIC BLOOD PRESSURE: 65 MMHG | BODY MASS INDEX: 32.14 KG/M2 | SYSTOLIC BLOOD PRESSURE: 133 MMHG | HEIGHT: 65 IN | WEIGHT: 192.88 LBS | OXYGEN SATURATION: 100 % | TEMPERATURE: 98 F | HEART RATE: 87 BPM | RESPIRATION RATE: 18 BRPM

## 2022-10-22 DIAGNOSIS — S22.080A COMPRESSION FRACTURE OF T12 VERTEBRA, INITIAL ENCOUNTER: Primary | ICD-10-CM

## 2022-10-22 PROCEDURE — 63600175 PHARM REV CODE 636 W HCPCS: Performed by: SURGERY

## 2022-10-22 PROCEDURE — 96372 THER/PROPH/DIAG INJ SC/IM: CPT | Performed by: SURGERY

## 2022-10-22 PROCEDURE — 99285 EMERGENCY DEPT VISIT HI MDM: CPT | Mod: 25

## 2022-10-22 RX ORDER — HYDROMORPHONE HYDROCHLORIDE 1 MG/ML
1 INJECTION, SOLUTION INTRAMUSCULAR; INTRAVENOUS; SUBCUTANEOUS
Status: COMPLETED | OUTPATIENT
Start: 2022-10-22 | End: 2022-10-22

## 2022-10-22 RX ORDER — ONDANSETRON 2 MG/ML
4 INJECTION INTRAMUSCULAR; INTRAVENOUS
Status: COMPLETED | OUTPATIENT
Start: 2022-10-22 | End: 2022-10-22

## 2022-10-22 RX ORDER — OXYCODONE AND ACETAMINOPHEN 5; 325 MG/1; MG/1
1 TABLET ORAL EVERY 4 HOURS PRN
Qty: 20 TABLET | Refills: 0 | Status: SHIPPED | OUTPATIENT
Start: 2022-10-22 | End: 2022-10-27 | Stop reason: SDUPTHER

## 2022-10-22 RX ORDER — HYDROMORPHONE HYDROCHLORIDE 1 MG/ML
0.5 INJECTION, SOLUTION INTRAMUSCULAR; INTRAVENOUS; SUBCUTANEOUS
Status: COMPLETED | OUTPATIENT
Start: 2022-10-22 | End: 2022-10-22

## 2022-10-22 RX ORDER — HYDROCODONE BITARTRATE AND ACETAMINOPHEN 5; 325 MG/1; MG/1
1 TABLET ORAL EVERY 4 HOURS PRN
Qty: 20 TABLET | Refills: 0 | Status: SHIPPED | OUTPATIENT
Start: 2022-10-22 | End: 2022-10-22 | Stop reason: ALTCHOICE

## 2022-10-22 RX ADMIN — ONDANSETRON 4 MG: 2 INJECTION INTRAMUSCULAR; INTRAVENOUS at 07:10

## 2022-10-22 RX ADMIN — HYDROMORPHONE HYDROCHLORIDE 0.5 MG: 1 INJECTION, SOLUTION INTRAMUSCULAR; INTRAVENOUS; SUBCUTANEOUS at 10:10

## 2022-10-22 RX ADMIN — HYDROMORPHONE HYDROCHLORIDE 1 MG: 1 INJECTION, SOLUTION INTRAMUSCULAR; INTRAVENOUS; SUBCUTANEOUS at 07:10

## 2022-10-23 NOTE — ED PROVIDER NOTES
Encounter Date: 10/22/2022       History     Chief Complaint   Patient presents with    Fall    Tailbone Pain    Wrist Injury     Pt reports fall roller skating tonight tailbone and bilateral wrist.      42-year-old female presents with tailbone pain after slip & fall today  Patient fell on her sacral area at a skating rink earlier this evening  Patient with back pain after the fall with no head trauma or LOC then  Patient has normal motor strength in her lower extremities on exam  Patient has tenderness around the L1 area with no step-off noted    Review of patient's allergies indicates:   Allergen Reactions    Tetanus vaccines and toxoid      Past Medical History:   Diagnosis Date    Abnormal Pap smear of cervix age 34    no treatment; repeat PAPs normal    ADHD (attention deficit hyperactivity disorder), inattentive type 2007    diagnosed by Dr. Denny - taking Adderall 10 mg twice daily from 2007 to  - increased Adderall to 20 mg twice daily until  - patient quit school and got off med - started back on Adderall 10 in  but then off med when had baby in  - has not been on med since having baby in .    Anxiety and depression     IVANA (generalized anxiety disorder) 2021    Followed by [facility]    IBS (irritable bowel syndrome)     Insomnia     Raynaud's disease     followed by Rheumatology Dr. Villavicencio     Past Surgical History:   Procedure Laterality Date    APPENDECTOMY       SECTION      x2 2002-10/21/2013    COLONOSCOPY N/A 2022    Procedure: COLONOSCOPY;  Surgeon: Cheryl Hood MD;  Location: [facility];  Service: Endoscopy;  Laterality: N/A;    HYSTEROSCOPY WITH DILATION AND CURETTAGE OF UTERUS N/A 2022    Procedure: HYSTEROSCOPY, WITH DILATION AND CURETTAGE OF UTERUS;  Surgeon: Jigar Denny MD;  Location: [facility];  Service: OB/GYN;  Laterality: N/A;    LAPAROSCOPIC APPENDECTOMY N/A 2019     Procedure: APPENDECTOMY, LAPAROSCOPIC (ADD ON );  Surgeon: Erasto Hendricks MD;  Location: [facility] OR;  Service: General;  Laterality: N/A;  (ADD ON )    THERMAL ABLATION OF ENDOMETRIUM N/A 5/13/2022    Procedure: ABLATION, ENDOMETRIUM, THERMAL (NOVASURE);  Surgeon: Jigar Denny MD;  [facility];  Service: OB/GYN;  Laterality: N/A;     Family History   Problem Relation Age of Onset    Hypertension Mother     COPD Mother 50    Rheum arthritis Mother     Colon polyps Father     Cancer Father 64        prostate cancer    Stroke Maternal Grandmother     Pneumonia Maternal Grandmother         passed age 88    Cancer Maternal Grandfather         brain tumor dont know what age he passed    Suicide Paternal Grandfather         passed in his 50's    No Known Problems Sister     Montoya syndrome Sister     Heart disease Sister     Thyroid disease Sister     Breast cancer Paternal Aunt     Colon cancer Neg Hx     Ovarian cancer Neg Hx      Social History     Tobacco Use    Smoking status: Never    Smokeless tobacco: Never   Substance Use Topics    Alcohol use: Yes     Comment: occasional    Drug use: No     Review of Systems   Constitutional: Negative.    HENT: Negative.     Eyes: Negative.    Respiratory: Negative.     Cardiovascular: Negative.    Gastrointestinal: Negative.    Genitourinary: Negative.    Musculoskeletal:  Positive for back pain.   Skin: Negative.    Neurological: Negative.    Psychiatric/Behavioral: Negative.       Physical Exam     Initial Vitals [10/22/22 1914]   BP Pulse Resp Temp SpO2   (!) 183/82 99 20 97.6 °F (36.4 °C) 98 %      MAP       --         Physical Exam    Nursing note and vitals reviewed.  Constitutional: Vital signs are normal. She appears well-developed and well-nourished. She is cooperative.   HENT:   Head: Normocephalic and atraumatic.   Right Ear: External ear normal.   Left Ear: External ear normal.   Nose: Nose normal.   Mouth/Throat: Oropharynx is clear and moist.    Eyes: Conjunctivae, EOM and lids are normal. Pupils are equal, round, and reactive to light.   Neck: Trachea normal and phonation normal. Neck supple. No JVD present.   Normal range of motion.   Full passive range of motion without pain.     Cardiovascular:  Normal rate, regular rhythm, S1 normal, S2 normal, normal heart sounds, intact distal pulses and normal pulses.           Pulmonary/Chest: Effort normal and breath sounds normal.   Abdominal: Abdomen is soft and flat. Bowel sounds are normal.   Musculoskeletal:      Cervical back: Full passive range of motion without pain, normal range of motion and neck supple.      Comments: (+) point tenderness the L1 area with no step-off or deformity     Neurological: She is alert and oriented to person, place, and time. She has normal strength.   Skin: Skin is warm, dry and intact. Capillary refill takes less than 2 seconds.       ED Course   Procedures  Labs Reviewed - No data to display       Imaging Results               CT Lumbar Spine Without Contrast (Final result)  Result time 10/22/22 20:19:06      Final result by Lev Valerio MD (10/22/22 20:19:06)                   Impression:      1. No acute abnormality of the lumbar spine.  2. Degenerative changes primarily at L5-S1 and L4-5.  3. Mild acute compression fracture of the superior endplate of T12.  See CT T-spine report.  4.  This report was flagged in Epic as abnormal.      Electronically signed by: Lev Valerio  Date:    10/22/2022  Time:    20:19               Narrative:    EXAMINATION:  CT LUMBAR SPINE WITHOUT CONTRAST    CLINICAL HISTORY:  Low back pain, trauma;Lumbar radiculopathy, trauma;Ataxia, lumbar trauma;    TECHNIQUE:  Low-dose axial, sagittal and coronal reformations are obtained through the lumbar spine.  Contrast was not administered.    COMPARISON:  11/05/2019    FINDINGS:  No acute lumbar fractures are detected.  Grade 1 retrolisthesis of L4 on L5.  No significant change.    Severe disc  space narrowing at L5-S1 with vacuum disc phenomena.  .    L1-2: No significant abnormality.    L2-3: No significant abnormality.    L3-4: No significant abnormality.    L4-5: Grade 1 retrolisthesis of L4 on L5.  Mild posterior disc osteophyte complex.  Central canal is adequately maintained.  Mild right foraminal narrowing.    L5-S1: Mild diffuse posterior disc osteophyte complex.  No significant central canal narrowing.  Mild right and moderate left foraminal narrowing.    The remaining visualized paravertebral structures demonstrate no pathology.    Mild acute compression fracture of the superior endplate of T12.  See CT T-spine report.                                        CT Thoracic Spine Without Contrast (Final result)  Result time 10/22/22 20:18:44      Final result by Lev Valerio MD (10/22/22 20:18:44)                   Impression:      Mild acute compression fracture of the superior endplate of T12 with minimal retropulsion.  No significant central canal or foraminal narrowing.  Follow-up recommended.    This report was flagged in Epic as abnormal.      Electronically signed by: Lev Valerio  Date:    10/22/2022  Time:    20:18               Narrative:    EXAMINATION:  CT THORACIC SPINE WITHOUT CONTRAST    CLINICAL HISTORY:  Ataxia, thoracic trauma;    TECHNIQUE:  Low-dose axial images through the thoracic spine without contrast.  Sagittal and coronal reconstructions.    COMPARISON:  None    FINDINGS:  Mild acute compression fracture of the superior endplate of T12.  There is 2-3 mm retropulsion of the posterior superior vertebral body margin.    No significant central canal or foraminal narrowing throughout the thoracic spine.    Posterior elements are intact.  The facets are normally positioned.    No significant abnormality elsewhere in the thoracic spine.    No paraspinal mass or fluid collection.                                       CT Cervical Spine Without Contrast (Final result)  Result  time 10/22/22 20:05:54      Final result by Lev Valerio MD (10/22/22 20:05:54)                   Impression:      1. No acute abnormality.  2. Mild degenerative changes.      Electronically signed by: Lev Valerio  Date:    10/22/2022  Time:    20:05               Narrative:    EXAMINATION:  CT CERVICAL SPINE WITHOUT CONTRAST    CLINICAL HISTORY:  Neck trauma, dangerous injury mechanism (Age 16-64y);Polytrauma, blunt;    TECHNIQUE:  Low dose axial CT images through the cervical spine, with sagittal and coronal reformations.  Contrast was not administered.    COMPARISON:  None    FINDINGS:  The vertebral bodies are normal in height and morphology without evidence of fracture or osseous destructive process.  Slight reversal of normal cervical lordosis.  Minimal spondylolisthesis of C2 on C3.    Mild diffuse posterior disc osteophyte complex at C3-4, 5-6 and 6-7.    No significant central canal narrowing.  No significant foraminal    Limited evaluation of the intraspinal contents demonstrates no hematoma or mass.Paraspinal soft tissues exhibit no acute abnormalities.                                       Medications   HYDROmorphone injection 1 mg (1 mg Intramuscular Given 10/22/22 1928)   ondansetron injection 4 mg (4 mg Intramuscular Given 10/22/22 1927)   HYDROmorphone injection 0.5 mg (0.5 mg Intramuscular Given 10/22/22 2209)     Medical Decision Making:   Initial Assessment:   Low back pain after slip & fall this evening    Differential Diagnosis:   Pain, strain, fracture, dislocation, soft tissue injury    Clinical Tests:   Radiological Study: Ordered and Reviewed    ED Management:  T12 compression fracture noted on CT of the lumbar spine today  [facility] called with a TLSO brace placed in the ER today  Patient also had an ambulatory referral placed for spine evaluation  Pain control on discharge with Percocet, return with any concerns                        Clinical Impression:   Final  diagnoses:  [S22.080A] Compression fracture of T12 vertebra, initial encounter (Primary)        ED Disposition Condition    Discharge Stable          ED Prescriptions       Medication Sig Dispense Start Date End Date Auth. Provider    HYDROcodone-acetaminophen (NORCO) 5-325 mg per tablet  (Status: Discontinued) Take 1 tablet by mouth every 4 (four) hours as needed for Pain. 20 tablet 10/22/2022 10/22/2022 Jesus Thompson MD    oxyCODONE-acetaminophen (PERCOCET) 5-325 mg per tablet Take 1 tablet by mouth every 4 (four) hours as needed for Pain. 20 tablet 10/22/2022 -- Jesus Thompson MD          Follow-up Information       Follow up With Specialties Details Why Contact Info    Nikolai Roberts Jr., MD Orthopedic Surgery Schedule an appointment as soon as possible for a visit in 2 days  [address]  [facility]  [facility] [address]  [phone]               Jesus Thompson MD  10/22/22 8422

## 2022-10-27 ENCOUNTER — OFFICE VISIT (OUTPATIENT)
Dept: FAMILY MEDICINE | Facility: CLINIC | Age: 42
End: 2022-10-27
Payer: MEDICAID

## 2022-10-27 VITALS
BODY MASS INDEX: 33.91 KG/M2 | SYSTOLIC BLOOD PRESSURE: 138 MMHG | DIASTOLIC BLOOD PRESSURE: 86 MMHG | TEMPERATURE: 98 F | HEIGHT: 65 IN | HEART RATE: 78 BPM | OXYGEN SATURATION: 99 % | WEIGHT: 203.5 LBS

## 2022-10-27 DIAGNOSIS — E66.09 CLASS 1 OBESITY DUE TO EXCESS CALORIES WITHOUT SERIOUS COMORBIDITY WITH BODY MASS INDEX (BMI) OF 33.0 TO 33.9 IN ADULT: ICD-10-CM

## 2022-10-27 DIAGNOSIS — S22.080D COMPRESSION FRACTURE OF T12 VERTEBRA WITH ROUTINE HEALING, SUBSEQUENT ENCOUNTER: Primary | ICD-10-CM

## 2022-10-27 DIAGNOSIS — S22.080A COMPRESSION FRACTURE OF T12 VERTEBRA, INITIAL ENCOUNTER: Primary | ICD-10-CM

## 2022-10-27 DIAGNOSIS — R03.0 TEMPORARY HIGH BLOOD PRESSURE: ICD-10-CM

## 2022-10-27 PROCEDURE — 3075F SYST BP GE 130 - 139MM HG: CPT | Mod: CPTII,,, | Performed by: NURSE PRACTITIONER

## 2022-10-27 PROCEDURE — 3079F DIAST BP 80-89 MM HG: CPT | Mod: CPTII,,, | Performed by: NURSE PRACTITIONER

## 2022-10-27 PROCEDURE — 1160F PR REVIEW ALL MEDS BY PRESCRIBER/CLIN PHARMACIST DOCUMENTED: ICD-10-PCS | Mod: CPTII,,, | Performed by: NURSE PRACTITIONER

## 2022-10-27 PROCEDURE — 3075F PR MOST RECENT SYSTOLIC BLOOD PRESS GE 130-139MM HG: ICD-10-PCS | Mod: CPTII,,, | Performed by: NURSE PRACTITIONER

## 2022-10-27 PROCEDURE — 99999 PR PBB SHADOW E&M-EST. PATIENT-LVL V: ICD-10-PCS | Mod: PBBFAC,,, | Performed by: NURSE PRACTITIONER

## 2022-10-27 PROCEDURE — 99215 OFFICE O/P EST HI 40 MIN: CPT | Mod: PBBFAC,PN | Performed by: NURSE PRACTITIONER

## 2022-10-27 PROCEDURE — 3079F PR MOST RECENT DIASTOLIC BLOOD PRESSURE 80-89 MM HG: ICD-10-PCS | Mod: CPTII,,, | Performed by: NURSE PRACTITIONER

## 2022-10-27 PROCEDURE — 1159F PR MEDICATION LIST DOCUMENTED IN MEDICAL RECORD: ICD-10-PCS | Mod: CPTII,,, | Performed by: NURSE PRACTITIONER

## 2022-10-27 PROCEDURE — 99214 PR OFFICE/OUTPT VISIT, EST, LEVL IV, 30-39 MIN: ICD-10-PCS | Mod: S$PBB,,, | Performed by: NURSE PRACTITIONER

## 2022-10-27 PROCEDURE — 99214 OFFICE O/P EST MOD 30 MIN: CPT | Mod: S$PBB,,, | Performed by: NURSE PRACTITIONER

## 2022-10-27 PROCEDURE — 1160F RVW MEDS BY RX/DR IN RCRD: CPT | Mod: CPTII,,, | Performed by: NURSE PRACTITIONER

## 2022-10-27 PROCEDURE — 99999 PR PBB SHADOW E&M-EST. PATIENT-LVL V: CPT | Mod: PBBFAC,,, | Performed by: NURSE PRACTITIONER

## 2022-10-27 PROCEDURE — 1159F MED LIST DOCD IN RCRD: CPT | Mod: CPTII,,, | Performed by: NURSE PRACTITIONER

## 2022-10-27 RX ORDER — HYDROCODONE BITARTRATE AND ACETAMINOPHEN 5; 325 MG/1; MG/1
1 TABLET ORAL EVERY 4 HOURS PRN
COMMUNITY
Start: 2022-10-23 | End: 2022-10-27 | Stop reason: ALTCHOICE

## 2022-10-27 RX ORDER — OXYCODONE AND ACETAMINOPHEN 5; 325 MG/1; MG/1
1 TABLET ORAL EVERY 4 HOURS PRN
Qty: 20 TABLET | Refills: 0 | Status: SHIPPED | OUTPATIENT
Start: 2022-10-27 | End: 2022-11-11 | Stop reason: ALTCHOICE

## 2022-10-27 NOTE — PROGRESS NOTES
Subjective:       Patient ID: Judie Schmitz is a 42 y.o. female.    Chief Complaint: Weight Loss (Discuss getting on Mounjour) and Referral (Need referral to Neuro surgery)    HPI    Patient is a 42-year-old white female with ADHD, Generalized Anxiety Disorder, chronic intermittent low back pain, excessive sweating, IBS, Raynaud Disease followed by [facility] and insomnia followed by [facility] that is here today for Compression Fracture to T12 and wants to discuss getting on weight loss injection Mounjaro.    ADHD and IVANA  Followed by [facility]  ON Adderall 20 mg - 30 mg in AM and 20 mg mid-day  On Clonazepam 0.5 mg prn    Insomnia  On Ambien 10 mg daily by Sleep Medicine MD Garcia    IBS  On Amitriptyline 50 mg at bedtime by Dr. Hood  On Bentyl prn    Compression Fracture T12 vertebra  Fall occurred on 10/22/2022  Fell while roller skating at daughter's birthday party  Seen and had imaging at [facility]  In [place] from the ER  Patient needs referral to Neurosurgery - she reports that the provider she was referred to from ER does not take Medicaid patients.  She states she called [facility] and they take Medicaid - need a referral placed and faxed before they can schedule patient.  Patient asking that I also request referred to [facility] and she will take whoever has the first available appointment.    Obesity  Body mass index is 33.86 kg/m².  Patient requesting Mounjaro prescription for weight loss  Explained to patient that Mounjaro is for DIABETES - it is not approved for weight loss and insurance will not cover medication.  Advised patient that she can try the local weight loss clinics        Review of Systems   Constitutional:  Positive for unexpected weight change.   HENT: Negative.     Respiratory: Negative.     Cardiovascular: Negative.    Gastrointestinal: Negative.    Genitourinary: Negative.    Musculoskeletal:  Positive for  "arthralgias, back pain and myalgias.   Neurological: Negative.        Objective:     Vitals:    10/27/22 1320 10/27/22 1332   BP: (!) 134/96 138/86   BP Location: Right arm    Patient Position: Sitting    BP Method: Large (Manual)    Pulse: 78    Temp: 98.2 °F (36.8 °C)    TempSrc: Temporal    SpO2: 99%    Weight: 92.3 kg (203 lb 7.8 oz)    Height: 5' 5" (1.651 m)           Physical Exam  Constitutional:       Appearance: Normal appearance. She is obese. She is not toxic-appearing or diaphoretic.      Comments: Body mass index is 33.86 kg/m².     HENT:      Head: Normocephalic and atraumatic.   Eyes:      General:         Right eye: No discharge.         Left eye: No discharge.      Extraocular Movements: Extraocular movements intact.      Conjunctiva/sclera: Conjunctivae normal.   Cardiovascular:      Rate and Rhythm: Normal rate and regular rhythm.      Heart sounds: Normal heart sounds.   Pulmonary:      Effort: Pulmonary effort is normal. No respiratory distress.      Breath sounds: Normal breath sounds.   Abdominal:      General: There is no distension.   Musculoskeletal:      Comments: Ambulating with steady gait.  Back brace from ER in place.   Skin:     General: Skin is warm and dry.   Neurological:      Mental Status: She is alert and oriented to person, place, and time.   Psychiatric:         Mood and Affect: Mood normal.         Behavior: Behavior normal.         Thought Content: Thought content normal.         Judgment: Judgment normal.         Assessment:         ICD-10-CM ICD-9-CM   1. Compression fracture of T12 vertebra, initial encounter  S22.080A 805.2   2. Class 1 obesity due to excess calories without serious comorbidity with body mass index (BMI) of 33.0 to 33.9 in adult  E66.09 278.00    Z68.33 V85.33   3. Temporary high blood pressure  R03.0 796.2       Plan:       Compression fracture of T12 vertebra, initial encounter  Percocet prescription #20 prescribed by Dr. Pattie Alberts for pain " management.  Advised to hold the Meloxicam and try rotating the Percocet every 3 to 4 hours with Ibuprofen 600 mg for pain control.  Referral to [facility] FAXED over as well as referral to [facility] - advised patient to take whichever neurosurgeon could get her in first.  -     Ambulatory referral/consult to Neurosurgery; Future; Expected date: 11/03/2022  -     Ambulatory referral/consult to Neurosurgery; Future; Expected date: 11/03/2022    Class 1 obesity due to excess calories without serious comorbidity with body mass index (BMI) of 33.0 to 33.9 in adult  Advised patient that Mounjaro is not for weight loss and can only be prescribed to Diabetics at this time.    Temporary high blood pressure    Follow up for see Neurosurgery; fasting labs and wellness exam after recovery from recent trauma.     Patient's Medications   New Prescriptions    No medications on file   Previous Medications    AMITRIPTYLINE (ELAVIL) 50 MG TABLET    Take 1 tablet (50 mg total) by mouth every evening.    ASCORBIC ACID, VITAMIN C, (VITAMIN C) 1000 MG TABLET    Take 1,000 mg by mouth once daily.    CLONAZEPAM (KLONOPIN) 0.5 MG DISINTEGRATING TABLET    Dissolve 1 tablet (0.5 mg total) by mouth nightly as needed (anxiety).    DEXTROAMPHETAMINE-AMPHETAMINE (ADDERALL) 20 MG TABLET    One and a half tablets by mouth in the morning and one tablet by mouth in the afternoon    DEXTROAMPHETAMINE-AMPHETAMINE (ADDERALL) 20 MG TABLET    One and a half tablets by mouth in the morning and one tablet by mouth in the afternoon    DEXTROAMPHETAMINE-AMPHETAMINE (ADDERALL) 20 MG TABLET    One and a half tablets by mouth in the morning and one tablet by mouth in the afternoon    DICYCLOMINE (BENTYL) 10 MG CAPSULE    Take 1 capsule (10 mg total) by mouth 4 (four) times daily as needed.    ETONOGESTREL-ETHINYL ESTRADIOL (NUVARING) 0.12-0.015 MG/24 HR VAGINAL RING    Place 1 each vaginally every 21 days. Insert one (1) ring  vaginally and leave in place for three (3) weeks, then remove for one (1) week.    ETONOGESTREL-ETHINYL ESTRADIOL (NUVARING) 0.12-0.015 MG/24 HR VAGINAL RING    Place 1 each vaginally every 21 days. Insert one (1) ring vaginally and leave in place for three (3) weeks, then remove for one (1) week.    MELOXICAM (MOBIC) 15 MG TABLET    Take 1 tablet (15 mg total) by mouth once daily.    MV-MIN/IRON/FOLIC/CALCIUM/VITK (WOMEN'S MULTIVITAMIN ORAL)    Take by mouth.    PANTOPRAZOLE (PROTONIX) 40 MG TABLET    Take 1 tablet (40 mg total) by mouth once daily.    ZOLPIDEM (AMBIEN) 10 MG TAB    TAKE ONE TABLET BY MOUTH NIGHTLY BEFORE BEDTIME AS NEEDED FOR INSOMNIA   Modified Medications    Modified Medication Previous Medication    OXYCODONE-ACETAMINOPHEN (PERCOCET) 5-325 MG PER TABLET oxyCODONE-acetaminophen (PERCOCET) 5-325 mg per tablet       Take 1 tablet by mouth every 4 (four) hours as needed for Pain.    Take 1 tablet by mouth every 4 (four) hours as needed for Pain.   Discontinued Medications    HYDROCODONE-ACETAMINOPHEN (NORCO) 5-325 MG PER TABLET    Take 1 tablet by mouth every 4 (four) hours as needed.       Past Medical History:   Diagnosis Date    Abnormal Pap smear of cervix age 34    no treatment; repeat PAPs normal    ADHD (attention deficit hyperactivity disorder), inattentive type 09/13/2007    diagnosed by Dr. Denny - taking Adderall 10 mg twice daily from 9/2007 to 2009 - increased Adderall to 20 mg twice daily until 2011 - patient quit school and got off med - started back on Adderall 10 in 2012 but then off med when had baby in 2013 - has not been on med since having baby in 2013.    Anxiety and depression     IVANA (generalized anxiety disorder) 6/17/2021    Followed by [facility] Patrice Padgett NP    IBS (irritable bowel syndrome)     Insomnia     Raynaud's disease     followed by Rheumatology Dr. Villavicencio       Past Surgical History:   Procedure Laterality Date    ADENOIDECTOMY       APPENDECTOMY       SECTION      x2 2002-10/21/2013    COLONOSCOPY N/A 2022    Procedure: COLONOSCOPY;  Surgeon: Cheryl Hood MD;  Location: [facility];  Service: Endoscopy;  Laterality: N/A;    HYSTEROSCOPY WITH DILATION AND CURETTAGE OF UTERUS N/A 2022    Procedure: HYSTEROSCOPY, WITH DILATION AND CURETTAGE OF UTERUS;  Surgeon: Jigar Denny MD;  Location: [facility];  Service: OB/GYN;  Laterality: N/A;    LAPAROSCOPIC APPENDECTOMY N/A 2019    Procedure: APPENDECTOMY, LAPAROSCOPIC (ADD ON );  Surgeon: Erasto Hendricks MD;  [place] [facility] [place];  Service: General;  Laterality: N/A;  (ADD ON )    THERMAL ABLATION OF ENDOMETRIUM N/A 2022    Procedure: ABLATION, ENDOMETRIUM, THERMAL (NOVASURE);  Surgeon: Jigar Denny MD;  Location: [facility];  Service: OB/GYN;  Laterality: N/A;       Family History   Problem Relation Age of Onset    Hypertension Mother     COPD Mother     Rheum arthritis Mother     Arthritis Mother     Colon polyps Father     Cancer Father         prostate cancer    Stroke Maternal Grandmother     Pneumonia Maternal Grandmother         passed age 88    Cancer Maternal Grandfather         brain tumor dont know what age he passed    Suicide Paternal Grandfather         passed in his 50's    No Known Problems Sister     Montoya syndrome Sister     Heart disease Sister     Thyroid disease Sister     Breast cancer Paternal Aunt     Colon cancer Neg Hx     Ovarian cancer Neg Hx        Social History     Socioeconomic History    Marital status:     Number of children: 2   Occupational History    Occupation:    Tobacco Use    Smoking status: Never    Smokeless tobacco: Never   Substance and Sexual Activity    Alcohol use: Yes     Comment: occasional    Drug use: No    Sexual activity: Yes     Partners: Male     Birth control/protection: Inserts     Comment:    Social History Narrative    Lives in [place].

## 2022-10-28 ENCOUNTER — PATIENT MESSAGE (OUTPATIENT)
Dept: NEUROSURGERY | Facility: CLINIC | Age: 42
End: 2022-10-28
Payer: MEDICAID

## 2022-11-04 ENCOUNTER — PATIENT MESSAGE (OUTPATIENT)
Dept: FAMILY MEDICINE | Facility: CLINIC | Age: 42
End: 2022-11-04
Payer: MEDICAID

## 2022-11-04 DIAGNOSIS — S22.080D COMPRESSION FRACTURE OF T12 VERTEBRA WITH ROUTINE HEALING, SUBSEQUENT ENCOUNTER: ICD-10-CM

## 2022-11-04 RX ORDER — TRAMADOL HYDROCHLORIDE 50 MG/1
50 TABLET ORAL EVERY 6 HOURS PRN
Qty: 28 TABLET | Refills: 0 | Status: SHIPPED | OUTPATIENT
Start: 2022-11-04 | End: 2022-11-11 | Stop reason: SDUPTHER

## 2022-11-04 RX ORDER — OXYCODONE AND ACETAMINOPHEN 5; 325 MG/1; MG/1
1 TABLET ORAL EVERY 4 HOURS PRN
Qty: 20 TABLET | Refills: 0 | OUTPATIENT
Start: 2022-11-04

## 2022-11-04 RX ORDER — MELOXICAM 15 MG/1
15 TABLET ORAL DAILY
Qty: 30 TABLET | Refills: 0 | Status: SHIPPED | OUTPATIENT
Start: 2022-11-04

## 2022-11-04 RX ORDER — CLONAZEPAM 0.5 MG/1
0.5 TABLET, ORALLY DISINTEGRATING ORAL NIGHTLY PRN
Qty: 30 TABLET | Refills: 0 | Status: SHIPPED | OUTPATIENT
Start: 2022-11-04 | End: 2023-04-05 | Stop reason: SDUPTHER

## 2022-11-04 NOTE — TELEPHONE ENCOUNTER
Please notify that Tramadol was sent to [facility] - to make sure to go  today before 5:30 pm when they close.

## 2022-11-11 ENCOUNTER — PATIENT MESSAGE (OUTPATIENT)
Dept: FAMILY MEDICINE | Facility: CLINIC | Age: 42
End: 2022-11-11
Payer: MEDICAID

## 2022-11-14 ENCOUNTER — PATIENT MESSAGE (OUTPATIENT)
Dept: FAMILY MEDICINE | Facility: CLINIC | Age: 42
End: 2022-11-14
Payer: MEDICAID

## 2022-11-23 NOTE — PROGRESS NOTES
Subjective:       Patient ID: Judie Schmitz is a 37 y.o. female.    Chief Complaint: Annual Exam (wellness)    Patient is a 37 year old female here today for annual physical exam with fasting lab results.    Patient has ADHD. Patient has a history of ADHD - inattentive type and treated on and off from 2007 until end of 2012 when she got pregnant.  Patient diagnosed with ADHD by Dr. Denny in September 2007.  She was started on Adderall 10 mg twice daily and eventually titrated up to 20 mg twice daily from 2009 until 2011.  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 In August 2016, she returned and got back on ADHD treatment and then was followed by Dr. Harrison for the past year. She is currently on Adderall 15 mg twice daily and reports she does not find it is effective at focusing and completing tasks throughout the day. Feels like the AM dose is ineffective, admits to not always taking PM dose.    Patient reports chronic intermittent low back pain.  She reports that Dr. Clarke had put her on Flexeril prn back pain and medication definitely helps but reports that for the past week, back pain is worsening.  Patient does reports she did a lot of housework this weekend and woke up Monday morning with increased back pains.    Patient reports intermittent anxiety/insomnia.  Was prescribed Clonazepam prn - reports takes on average once or twice weekly prn.    Wellness Labs:  -  CBC WNL  -  CMP WNL  -  Cholesterol WNL  -  TSH WNL    Health Maintenance:  -  THinks she had Tdap around birth of last child at [facility] in 2013 - will request records.  Patient will also check her immunization records at home.        Lab Visit on 02/15/2018   Component Date Value Ref Range Status    WBC 02/15/2018 6.42  3.90 - 12.70 K/uL Final    RBC 02/15/2018 4.50  4.00 - 5.40  M/uL Final    Hemoglobin 02/15/2018 12.4  12.0 - 16.0 g/dL Final    Hematocrit 02/15/2018 38.4  37.0 - 48.5 % Final    MCV 02/15/2018 85  82 - 98 fL Final    MCH 02/15/2018 27.6  27.0 - 31.0 pg Final    MCHC 02/15/2018 32.3  32.0 - 36.0 g/dL Final    RDW 02/15/2018 13.3  11.5 - 14.5 % Final    Platelets 02/15/2018 263  150 - 350 K/uL Final    MPV 02/15/2018 11.1  9.2 - 12.9 fL Final    Gran # (ANC) 02/15/2018 3.8  1.8 - 7.7 K/uL Final    Lymph # 02/15/2018 1.9  1.0 - 4.8 K/uL Final    Mono # 02/15/2018 0.5  0.3 - 1.0 K/uL Final    Eos # 02/15/2018 0.1  0.0 - 0.5 K/uL Final    Baso # 02/15/2018 0.04  0.00 - 0.20 K/uL Final    Gran% 02/15/2018 59.7  38.0 - 73.0 % Final    Lymph% 02/15/2018 29.9  18.0 - 48.0 % Final    Mono% 02/15/2018 7.9  4.0 - 15.0 % Final    Eosinophil% 02/15/2018 1.9  0.0 - 8.0 % Final    Basophil% 02/15/2018 0.6  0.0 - 1.9 % Final    Differential Method 02/15/2018 Automated   Final    Sodium 02/15/2018 143  136 - 145 mmol/L Final    Potassium 02/15/2018 4.6  3.5 - 5.1 mmol/L Final    Chloride 02/15/2018 108  95 - 110 mmol/L Final    CO2 02/15/2018 26  23 - 29 mmol/L Final    Glucose 02/15/2018 91  70 - 110 mg/dL Final    BUN, Bld 02/15/2018 10  7 - 17 mg/dL Final    Creatinine 02/15/2018 0.75  0.50 - 1.40 mg/dL Final    Calcium 02/15/2018 9.4  8.7 - 10.5 mg/dL Final    Total Protein 02/15/2018 7.1  6.0 - 8.4 g/dL Final    Albumin 02/15/2018 3.9  3.5 - 5.2 g/dL Final    Total Bilirubin 02/15/2018 0.4  0.1 - 1.0 mg/dL Final    Comment: For infants and newborns, interpretation of results should be based  on gestational age, weight and in agreement with clinical  observations.  Premature Infant recommended reference ranges:  Up to 24 hours.............<8.0 mg/dL  Up to 48 hours............<12.0 mg/dL  3-5 days..................<15.0 mg/dL  6-29 days.................<15.0 mg/dL      Alkaline Phosphatase 02/15/2018 96  38 - 126 U/L Final    AST 02/15/2018 27  15 - 46 U/L  Final    ALT 02/15/2018 28  10 - 44 U/L Final    Anion Gap 02/15/2018 9  8 - 16 mmol/L Final    eGFR if African American 02/15/2018 >60.0  >60 mL/min/1.73 m^2 Final    eGFR if non African American 02/15/2018 >60.0  >60 mL/min/1.73 m^2 Final    Comment: Calculation used to obtain the estimated glomerular filtration  rate (eGFR) is the CKD-EPI equation.       Cholesterol 02/15/2018 189  120 - 199 mg/dL Final    Comment: The National Cholesterol Education Program (NCEP) has set the  following guidelines (reference ranges) for Cholesterol:  Optimal.....................<200 mg/dL  Borderline High.............200-239 mg/dL  High........................> or = 240 mg/dL      Triglycerides 02/15/2018 99  30 - 150 mg/dL Final    Comment: The National Cholesterol Education Program (NCEP) has set the  following guidelines (reference values) for triglycerides:  Normal......................<150 mg/dL  Borderline High.............150-199 mg/dL  High........................200-499 mg/dL      HDL 02/15/2018 66  40 - 75 mg/dL Final    Comment: The National Cholesterol Education Program (NCEP) has set the  following guidelines (reference values) for HDL Cholesterol:  Low...............<40 mg/dL  Optimal...........>60 mg/dL      LDL Cholesterol 02/15/2018 103.2  63.0 - 159.0 mg/dL Final    Comment: The National Cholesterol Education Program (NCEP) has set the  following guidelines (reference values) for LDL Cholesterol:  Optimal.......................<130 mg/dL  Borderline High...............130-159 mg/dL  High..........................160-189 mg/dL  Very High.....................>190 mg/dL      HDL/Chol Ratio 02/15/2018 34.9  20.0 - 50.0 % Final    Total Cholesterol/HDL Ratio 02/15/2018 2.9  2.0 - 5.0 Final    Non-HDL Cholesterol 02/15/2018 123  mg/dL Final    Comment: Risk category and Non-HDL cholesterol goals:  Coronary heart disease (CHD)or equivalent (10-year risk of CHD >20%):  Non-HDL cholesterol goal     <130  mg/dL  Two or more CHD risk factors and 10-year risk of CHD <= 20%:  Non-HDL cholesterol goal     <160 mg/dL  0 to 1 CHD risk factor:  Non-HDL cholesterol goal     <190 mg/dL      TSH 02/15/2018 2.270  0.400 - 4.000 uIU/mL Final    Comment: Warning:  Heterophilic antibodies in serum or plasma of   certain individuals are known to cause interference with   immunoassays. These antibodies may be present in blood samples   from individuals regularly exposed to animal or who have been   treated with animal products.          Previous Medications    CLONAZEPAM (KLONOPIN) 0.5 MG TABLET    Take 0.5 mg by mouth daily as needed for Anxiety (insomnia).     CYCLOBENZAPRINE (FLEXERIL) 10 MG TABLET    Take 10 mg by mouth 3 (three) times daily as needed for Muscle spasms.    DEXTROAMPHETAMINE-AMPHETAMINE (ADDERALL) 15 MG TABLET    Take 1 tablet (15 mg total) by mouth 2 (two) times daily.    ETONOGESTREL-ETHINYL ESTRADIOL (NUVARING) 0.12-0.015 MG/24 HR VAGINAL RING    PLACE 1 RING VAGINALLY EVERY 28 DAYS       Past Medical History:   Diagnosis Date    Abnormal Pap smear of cervix age 34    no treatment    ADHD (attention deficit hyperactivity disorder), inattentive type 2007    diagnosed by Dr. Denny - taking Adderall 10 mg twice daily from 2007 to  - increased Adderall to 20 mg twice daily until  - patient quit school and got off me - started back on Adderall 10 in  but then off med when had baby in  - has not been on med since having baby in .    Anxiety and depression     IBS (irritable bowel syndrome)     Insomnia        Past Surgical History:   Procedure Laterality Date     SECTION      x2 2002-10/21/2013       Family History   Problem Relation Age of Onset    Hypertension Mother     COPD Mother 50    Rheum arthritis Mother     Colon polyps Father     Stroke Maternal Grandmother     Pneumonia Maternal Grandmother      passed age 88    Cancer Maternal Grandfather       brain tumor dont know what age he passed    Suicide Paternal Grandfather      passed in his 50's    No Known Problems Sister     Montoya syndrome Sister     Heart disease Sister     Thyroid disease Sister     Breast cancer Paternal Aunt     Colon cancer Neg Hx     Ovarian cancer Neg Hx        Social History     Social History    Marital status:      Spouse name: N/A    Number of children: 2    Years of education: N/A     Occupational History    Medical Assistant      Social History Main Topics    Smoking status: Never Smoker    Smokeless tobacco: Never Used    Alcohol use Yes      Comment: occasional    Drug use: No    Sexual activity: Yes     Partners: Male     Birth control/ protection: Inserts      Comment:      Other Topics Concern    None     Social History Narrative    Lives in [place].        Review of Systems   Constitutional: Negative for appetite change, chills, fatigue, fever and unexpected weight change.   HENT: Negative for congestion, ear pain, mouth sores, nosebleeds, postnasal drip, rhinorrhea, sinus pressure, sneezing, sore throat, trouble swallowing and voice change.    Eyes: Negative for photophobia, pain, discharge, redness, itching and visual disturbance.   Respiratory: Negative for cough, chest tightness and shortness of breath.    Cardiovascular: Negative for chest pain, palpitations and leg swelling.   Gastrointestinal: Negative for abdominal pain, blood in stool, constipation, diarrhea, nausea and vomiting.   Genitourinary: Negative for dysuria, frequency, hematuria and urgency.   Musculoskeletal: Positive for back pain and myalgias. Negative for arthralgias and joint swelling.   Skin: Negative for color change and rash.   Allergic/Immunologic: Negative for immunocompromised state.   Neurological: Negative for dizziness, seizures, syncope, weakness and headaches.   Hematological: Negative for adenopathy. Does not bruise/bleed easily.   Psychiatric/Behavioral:  "Positive for decreased concentration. Negative for agitation, dysphoric mood, sleep disturbance and suicidal ideas. The patient is not nervous/anxious.          Objective:     Vitals:    02/21/18 1525   BP: 120/76   BP Location: Right arm   Patient Position: Sitting   BP Method: Medium (Manual)   Pulse: 88   Temp: 98.3 °F (36.8 °C)   TempSrc: Oral   SpO2: 100%   Weight: 82.5 kg (181 lb 14.1 oz)   Height: 5' 4" (1.626 m)          Physical Exam   Constitutional: She is oriented to person, place, and time. She appears well-developed and well-nourished.   Body mass index is 31.22 kg/m².     HENT:   Head: Normocephalic and atraumatic.   Right Ear: External ear normal.   Left Ear: External ear normal.   Nose: Nose normal.   Mouth/Throat: Oropharynx is clear and moist. No oropharyngeal exudate.   Eyes: EOM are normal. Pupils are equal, round, and reactive to light.   Neck: Normal range of motion. Neck supple. No tracheal deviation present. No thyromegaly present.   Cardiovascular: Normal rate, regular rhythm and normal heart sounds.    No murmur heard.  Pulmonary/Chest: Effort normal and breath sounds normal. No respiratory distress.   Abdominal: Soft. She exhibits no distension.   Musculoskeletal: Normal range of motion. She exhibits no edema.        Lumbar back: She exhibits pain and spasm. She exhibits no swelling, no edema and no deformity.        Back:    Negative SLRs.  Positive DTRs.  Neurologically intact.   Lymphadenopathy:     She has no cervical adenopathy.   Neurological: She is alert and oriented to person, place, and time. No cranial nerve deficit. Coordination normal.   Skin: Skin is warm and dry. No rash noted.   Psychiatric: She has a normal mood and affect.         Assessment:         ICD-10-CM ICD-9-CM   1. Annual physical exam Z00.00 V70.0   2. ADHD (attention deficit hyperactivity disorder), inattentive type F90.0 314.00   3. Chronic midline low back pain without sciatica M54.5 724.2    G89.29 338.29 "   4. Insomnia, unspecified type G47.00 780.52       Plan:       Annual physical exam  -  Patient will check immunization records at home and I will request [facility] records for Tdap adm.  Health Maintenance Summary     TETANUS VACCINE Overdue 7/1/2004     Done 7/1/1994 Imm Admin: Td (ADULT)   Pap Smear Next Due 10/31/2020     Done 10/31/2017 LIQUID-BASED PAP SMEAR, SCREENING    Done 8/19/2015    Influenza Vaccine Completed     Done 10/16/2017     Done 10/12/2016 Imm Admin: influenza - Quadrivalent - PF (ADULT)   Lipid Panel Completed     Done 2/15/2018 LIPID PANEL         ADHD (attention deficit hyperactivity disorder), inattentive type  -  Change Adderall 20 mg to 1 tablet in AM and 1/2 tablet mid-day - take as directed on workdays and will reassess in 4 weeks.  -     dextroamphetamine-amphetamine (ADDERALL) 20 mg tablet; Take 1 tablet in AM and 1/2 tablet mid-day  Dispense: 45 tablet; Refill: 0    Chronic midline low back pain without sciatica  -  Take Etodolac twice daily prn back pains and can continue Flexeril prn.  Will reassess in 4 weeks - if no improvement - may consider xray and PT vs. Refer to back and spine clinic  -     etodolac (LODINE) 400 MG tablet; Take 1 tablet (400 mg total) by mouth 2 (two) times daily.  Dispense: 60 tablet; Refill: 2    Insomnia, unspecified type  -  Can continue Clonazepam prn.      Follow-up in about 4 weeks (around 3/21/2018) for follow up.     Patient's Medications   New Prescriptions    DEXTROAMPHETAMINE-AMPHETAMINE (ADDERALL) 20 MG TABLET    Take 1 tablet in AM and 1/2 tablet mid-day    ETODOLAC (LODINE) 400 MG TABLET    Take 1 tablet (400 mg total) by mouth 2 (two) times daily.   Previous Medications    CLONAZEPAM (KLONOPIN) 0.5 MG TABLET    Take 0.5 mg by mouth daily as needed for Anxiety (insomnia).     CYCLOBENZAPRINE (FLEXERIL) 10 MG TABLET    Take 10 mg by mouth 3 (three) times daily as needed for Muscle spasms.    ETONOGESTREL-ETHINYL ESTRADIOL (NUVARING) 0.12-0.015  MG/24 HR VAGINAL RING    PLACE 1 RING VAGINALLY EVERY 28 DAYS   Modified Medications    No medications on file   Discontinued Medications    DEXTROAMPHETAMINE-AMPHETAMINE (ADDERALL) 15 MG TABLET    Take 1 tablet (15 mg total) by mouth 2 (two) times daily.      yes...

## 2022-12-16 ENCOUNTER — PATIENT MESSAGE (OUTPATIENT)
Dept: GASTROENTEROLOGY | Facility: CLINIC | Age: 42
End: 2022-12-16
Payer: MEDICAID

## 2022-12-16 ENCOUNTER — PATIENT MESSAGE (OUTPATIENT)
Dept: FAMILY MEDICINE | Facility: CLINIC | Age: 42
End: 2022-12-16
Payer: MEDICAID

## 2022-12-29 ENCOUNTER — PATIENT MESSAGE (OUTPATIENT)
Dept: SLEEP MEDICINE | Facility: CLINIC | Age: 42
End: 2022-12-29
Payer: MEDICAID

## 2022-12-29 DIAGNOSIS — G47.00 INSOMNIA, UNSPECIFIED TYPE: ICD-10-CM

## 2022-12-29 RX ORDER — ZOLPIDEM TARTRATE 10 MG/1
TABLET ORAL
Qty: 30 TABLET | Refills: 5 | Status: CANCELLED | OUTPATIENT
Start: 2022-12-29

## 2022-12-29 RX ORDER — ZOLPIDEM TARTRATE 10 MG/1
TABLET ORAL
Qty: 30 TABLET | Refills: 1 | Status: SHIPPED | OUTPATIENT
Start: 2022-12-29 | End: 2023-03-06 | Stop reason: SDUPTHER

## 2023-01-09 ENCOUNTER — PATIENT MESSAGE (OUTPATIENT)
Dept: SLEEP MEDICINE | Facility: CLINIC | Age: 43
End: 2023-01-09
Payer: MEDICAID

## 2023-01-12 ENCOUNTER — PATIENT MESSAGE (OUTPATIENT)
Dept: PSYCHIATRY | Facility: CLINIC | Age: 43
End: 2023-01-12
Payer: MEDICAID

## 2023-01-12 ENCOUNTER — PATIENT MESSAGE (OUTPATIENT)
Dept: OBSTETRICS AND GYNECOLOGY | Facility: CLINIC | Age: 43
End: 2023-01-12
Payer: MEDICAID

## 2023-01-12 RX ORDER — CLONAZEPAM 0.5 MG/1
0.5 TABLET, ORALLY DISINTEGRATING ORAL NIGHTLY PRN
Qty: 30 TABLET | Refills: 0 | OUTPATIENT
Start: 2023-01-12 | End: 2023-02-11

## 2023-01-23 ENCOUNTER — OFFICE VISIT (OUTPATIENT)
Dept: PSYCHIATRY | Facility: CLINIC | Age: 43
End: 2023-01-23
Payer: COMMERCIAL

## 2023-01-23 DIAGNOSIS — F90.0 ADHD (ATTENTION DEFICIT HYPERACTIVITY DISORDER), INATTENTIVE TYPE: Primary | ICD-10-CM

## 2023-01-23 DIAGNOSIS — F41.1 GAD (GENERALIZED ANXIETY DISORDER): ICD-10-CM

## 2023-01-23 PROCEDURE — 99214 OFFICE O/P EST MOD 30 MIN: CPT | Mod: 95,,, | Performed by: NURSE PRACTITIONER

## 2023-01-23 PROCEDURE — 99214 PR OFFICE/OUTPT VISIT, EST, LEVL IV, 30-39 MIN: ICD-10-PCS | Mod: 95,,, | Performed by: NURSE PRACTITIONER

## 2023-01-23 RX ORDER — SERTRALINE HYDROCHLORIDE 25 MG/1
12.5 TABLET, FILM COATED ORAL DAILY
Qty: 15 TABLET | Refills: 2 | Status: SHIPPED | OUTPATIENT
Start: 2023-01-23 | End: 2023-07-11

## 2023-01-23 RX ORDER — DEXTROAMPHETAMINE SACCHARATE, AMPHETAMINE ASPARTATE, DEXTROAMPHETAMINE SULFATE AND AMPHETAMINE SULFATE 5; 5; 5; 5 MG/1; MG/1; MG/1; MG/1
TABLET ORAL
Qty: 75 TABLET | Refills: 0 | Status: SHIPPED | OUTPATIENT
Start: 2023-02-21 | End: 2023-07-11

## 2023-01-23 RX ORDER — DEXTROAMPHETAMINE SACCHARATE, AMPHETAMINE ASPARTATE, DEXTROAMPHETAMINE SULFATE AND AMPHETAMINE SULFATE 5; 5; 5; 5 MG/1; MG/1; MG/1; MG/1
TABLET ORAL
Qty: 75 TABLET | Refills: 0 | Status: SHIPPED | OUTPATIENT
Start: 2023-01-23 | End: 2023-07-11 | Stop reason: SDUPTHER

## 2023-01-23 RX ORDER — DEXTROAMPHETAMINE SACCHARATE, AMPHETAMINE ASPARTATE, DEXTROAMPHETAMINE SULFATE AND AMPHETAMINE SULFATE 5; 5; 5; 5 MG/1; MG/1; MG/1; MG/1
TABLET ORAL
Qty: 75 TABLET | Refills: 0 | Status: SHIPPED | OUTPATIENT
Start: 2023-03-19 | End: 2023-04-05 | Stop reason: SDUPTHER

## 2023-01-23 NOTE — PROGRESS NOTES
"1/23/2023 11:30 AM   Judie Schmitz   1980   [id number]                                                                   OUTPATIENT PSYCHIATRY FOLLOW- UP VISIT     Reason for Encounter:  Judie Schmitz, a 42 y.o. female,who presents today for follow up of ADHD, anxiety. Met with patient.     Interval History and Content of Current Session:     Today,  Pt with hx of IBS (managed with Bentyl, Elavil) reports today as follow up from previous visit about 2 months prior.     Reports broke back about 2 months prior, and had lots of recovery related to this. Has been increasing usage of clonazepam for IBS flares and we discussed concerns over this (noted as increasing administration of this over the past several months). Reports Reynaud's has been flaring up.     Reports had been eating unhealthy, not exercising. 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 Discussed reading "The Body Keeps The Score." Discussed trial of sertraline for anxiety in lieu of increasing usage of clonazepam.     Reports recently started back with [employer] working as an MA and we discussed how this could be helpful.            Denies SI/HI, AVH, substance abuse, racing thoughts     Psychosocial stressors: financial, social, occupational pressures.         Review Of Systems:      Medical Review Of Systems:  Constitutional: negative for fatigue, fevers and night sweats  Cardiovascular: negative for chest pain, palpitations and syncope  Gastrointestinal: negative for abdominal pain, constipation, diarrhea, nausea and vomiting     Psychiatric Review Of Systems:  sleep: no  appetite changes: no  weight changes: no  energy/anergy: no  interest/pleasure/anhedonia: no  somatic symptoms: no  libido: no  anxiety/panic: no  guilty/hopeless: no  S.I.B.s/risky behavior: no  any drugs: no  alcohol: no       Sleep: 4 to 7 hours per night, has sleep study        Risk Parameters:  Patient reports no suicidal ideation  Patient reports no " homicidal ideation  Patient reports no self-injurious behavior  Patient reports no violent behavior        Current meds- Elavil (per rheumatology), Adderall, zolpidem (per sleep medicine)     Compliance: no     Side effects: None        Psychiatric, social, and family history reviewed this visit     Objective      ALL MEDICATIONS:     Current Outpatient Medications:     amitriptyline (ELAVIL) 50 MG tablet, Take 1 tablet (50 mg total) by mouth every evening., Disp: 90 tablet, Rfl: 3    ascorbic acid, vitamin C, (VITAMIN C) 1000 MG tablet, Take 1,000 mg by mouth once daily., Disp: , Rfl:     dextroamphetamine-amphetamine (ADDERALL) 20 mg tablet, Take 1 tablet (20 mg total) by mouth 2 (two) times a day., Disp: 60 tablet, Rfl: 0    dicyclomine (BENTYL) 10 MG capsule, Take 1 capsule (10 mg total) by mouth 4 (four) times daily as needed., Disp: 120 capsule, Rfl: 5    fluconazole (DIFLUCAN) 150 MG Tab, Take 1 tablet (150 mg total) by mouth once daily. for 1 day, Disp: 1 tablet, Rfl: 0    meloxicam (MOBIC) 15 MG tablet, Take 1 tablet (15 mg total) by mouth once daily., Disp: 30 tablet, Rfl: 1    methylPREDNISolone (MEDROL DOSEPACK) 4 mg tablet, use as directed, Disp: 21 each, Rfl: 0    mv-min/iron/folic/calcium/vitK (WOMEN'S MULTIVITAMIN ORAL), Take by mouth., Disp: , Rfl:     oxyCODONE-acetaminophen (PERCOCET) 5-325 mg per tablet, Take 1 tablet by mouth every 4 (four) hours as needed for Pain., Disp: 10 tablet, Rfl: 0    pantoprazole (PROTONIX) 40 MG tablet, Take 1 tablet (40 mg total) by mouth once daily., Disp: 90 tablet, Rfl: 3    segesterone ac-ethin estradioL (ANNOVERA) 0.15-0.013 mg/24 hour Ring, PLACE 1 DEVICE VAGINALLY EVERY 28 DAYS., Disp: 1 each, Rfl: 0    zolpidem (AMBIEN) 10 mg Tab, TAKE ONE TABLET BY MOUTH NIGHTLY BEFORE BEDTIME AS NEEDED FOR INSOMNIA, Disp: 30 tablet, Rfl: 5     ALLERGIES:          Review of patient's allergies indicates:   Allergen Reactions    Tetanus vaccines and toxoid           RELEVANT  LABS/STUDIES:              Lab Results   Component Value Date     WBC 7.33 08/27/2020     HGB 12.8 08/27/2020     HCT 40.1 08/27/2020     MCV 87 08/27/2020      (H) 08/27/2020      BMP            Lab Results   Component Value Date      11/05/2019     K 4.5 11/05/2019      11/05/2019     CO2 24 11/05/2019     BUN 12 11/05/2019     CREATININE 0.66 11/05/2019     CALCIUM 9.0 11/05/2019     ANIONGAP 7 (L) 11/05/2019     ESTGFRAFRICA >60.0 11/05/2019     EGFRNONAA >60.0 11/05/2019                Lab Results   Component Value Date     ALT 14 11/05/2019     AST 19 11/05/2019     ALKPHOS 76 11/05/2019     BILITOT <0.1 (A) 11/05/2019                Lab Results   Component Value Date     TSH 2.270 02/15/2018      No results found for: LABA1C, HGBA1C     Constitutional  Vitals:  Most recent vital signs, dated less than 90 days prior to this appointment, were reviewed.    There were no vitals filed for this visit.            PHYSICAL EXAM  General: well developed, well nourished  Neurologic:   Gait: Normal   Psychomotor signs:  No involuntary movements or tremor  AIMS: none     PSYCHIATRIC EXAM:     Mental Status Exam:  Appearance: unremarkable, age appropriate  Behavior/Cooperation: normal, cooperative  Speech: normal tone, normal rate, normal pitch, normal volume  Language: uses words appropriately; NO aphasia or dysarthria  Mood: steady  Affect: full, congruent with mood and appropriate to content/situation  Thought Process: normal and logical  Thought Content: normal, no suicidality, no homicidality, delusions, or paranoia  Level of Consciousness: Alert and Oriented x3  Memory:  Intact  Attention/concentration: appropriate for age/education.   Fund of Knowledge: appears adequate  Insight:  Intact  Judgment: Intact      Assessment and Diagnosis   Status/Progress: Based on the examination today, the patient's problem(s) is/are well controlled.  New problems have been presented today.   Co-morbidities are  complicating management of the primary condition.  There are no active rule-out diagnoses for this patient at this time.      General Impression:   IVANA   ADHD, inattentive type  Rule out Sleep apnea  Rule out PTSD        Intervention/Counseling/Treatment Plan   Medication Management: -        Continue Adderall 10 mg by mouth four daily (30 mg in the am and 20 mg in the pm)        Continue clonazepam 0.5 mg by mouth once daily as needed        Continue Elavil 50 mg by mouth once daily per rheumatology        Continue Ambien 5 mg by mouth once daily per sleep medicine  Labs: reviewed most recent  The treatment plan and follow up plan were reviewed with the patient.  Discussed with patient informed consent, risks vs. benefits, alternative treatments, side effect profile and the inherent unpredictability of individual responses to these treatments. The patient expresses understanding of the above and displays the capacity to agree with this current plan and had no other questions.  Encouraged Patient to keep future appointments.   Take medications as prescribed and abstain from substance abuse.   In the event of an emergency patient was advised to go to the emergency room.     Return to Clinic: 6 weeks, 1 month     > than 50% of total time spend on coordination of care and counseling   (which included pts differential diagnosis and prognosis for psychiatric conditions, risks, benefits of treatments, instructions and adherence to treatment plan, risk reduction, reviewing current psychiatric medication regimen, medical problems and social stressors. In addtion to possible discussion with other healthcare provider/s)     Add on Psychotherapy time:0  Total Face time: 35 min     The patient location is: [place], at home  The chief complaint leading to consultation is: med f/u     Visit type: audiovisual     Face to Face time with patient: 35 min  40 minutes of total time spent on the encounter, which includes face to face  time and non-face to face time preparing to see the patient (eg, review of tests), Obtaining and/or reviewing separately obtained history, Documenting clinical information in the electronic or other health record, Independently interpreting results (not separately reported) and communicating results to the patient/family/caregiver, or Care coordination (not separately reported).            Each patient to whom he or she provides medical services by telemedicine is:  (1) informed of the relationship between the physician and patient and the respective role of any other health care provider with respect to management of the patient; and (2) notified that he or she may decline to receive medical services by telemedicine and may withdraw from such care at any time.     Notes:      Eliecer Trinidad, MSN, APRN, PMHNP-BC  [facility]   1/23/2023 4:30 PM

## 2023-01-26 ENCOUNTER — TELEPHONE (OUTPATIENT)
Dept: FAMILY MEDICINE | Facility: CLINIC | Age: 43
End: 2023-01-26
Payer: COMMERCIAL

## 2023-01-26 DIAGNOSIS — Z13.29 THYROID DISORDER SCREEN: ICD-10-CM

## 2023-01-26 DIAGNOSIS — Z13.220 SCREENING CHOLESTEROL LEVEL: ICD-10-CM

## 2023-01-26 DIAGNOSIS — Z13.1 DIABETES MELLITUS SCREENING: ICD-10-CM

## 2023-01-26 DIAGNOSIS — Z11.4 SCREENING FOR HIV WITHOUT PRESENCE OF RISK FACTORS: ICD-10-CM

## 2023-01-26 DIAGNOSIS — Z11.59 ENCOUNTER FOR HEPATITIS C SCREENING TEST FOR LOW RISK PATIENT: ICD-10-CM

## 2023-01-26 DIAGNOSIS — Z00.00 ENCOUNTER FOR BLOOD TEST FOR ROUTINE GENERAL PHYSICAL EXAMINATION: Primary | ICD-10-CM

## 2023-01-26 DIAGNOSIS — Z13.0 SCREENING FOR DEFICIENCY ANEMIA: ICD-10-CM

## 2023-01-26 NOTE — TELEPHONE ENCOUNTER
Patient has wellness scheduled for Monday.    See if she is working with Dr. Byrd today - ask her to get fasting labs done tomorrow morning or Saturday morning so we have results at time of visit please..  if not working today, call her.

## 2023-01-30 ENCOUNTER — OFFICE VISIT (OUTPATIENT)
Dept: FAMILY MEDICINE | Facility: CLINIC | Age: 43
End: 2023-01-30
Payer: MEDICAID

## 2023-01-30 VITALS
HEART RATE: 87 BPM | SYSTOLIC BLOOD PRESSURE: 138 MMHG | WEIGHT: 197.06 LBS | OXYGEN SATURATION: 98 % | DIASTOLIC BLOOD PRESSURE: 88 MMHG | HEIGHT: 65 IN | TEMPERATURE: 98 F | BODY MASS INDEX: 32.83 KG/M2

## 2023-01-30 DIAGNOSIS — I73.00 RAYNAUD'S DISEASE WITHOUT GANGRENE: ICD-10-CM

## 2023-01-30 DIAGNOSIS — M54.50 CHRONIC MIDLINE LOW BACK PAIN WITHOUT SCIATICA: ICD-10-CM

## 2023-01-30 DIAGNOSIS — K58.0 IRRITABLE BOWEL SYNDROME WITH DIARRHEA: ICD-10-CM

## 2023-01-30 DIAGNOSIS — G47.00 INSOMNIA, UNSPECIFIED TYPE: ICD-10-CM

## 2023-01-30 DIAGNOSIS — Z00.00 ANNUAL PHYSICAL EXAM: Primary | ICD-10-CM

## 2023-01-30 DIAGNOSIS — F90.0 ADHD (ATTENTION DEFICIT HYPERACTIVITY DISORDER), INATTENTIVE TYPE: ICD-10-CM

## 2023-01-30 DIAGNOSIS — F41.1 GAD (GENERALIZED ANXIETY DISORDER): ICD-10-CM

## 2023-01-30 DIAGNOSIS — R03.0 TEMPORARY HIGH BLOOD PRESSURE: ICD-10-CM

## 2023-01-30 DIAGNOSIS — G89.29 CHRONIC MIDLINE LOW BACK PAIN WITHOUT SCIATICA: ICD-10-CM

## 2023-01-30 DIAGNOSIS — E66.09 CLASS 1 OBESITY DUE TO EXCESS CALORIES WITHOUT SERIOUS COMORBIDITY WITH BODY MASS INDEX (BMI) OF 32.0 TO 32.9 IN ADULT: ICD-10-CM

## 2023-01-30 PROBLEM — N93.0 POSTCOITAL BLEEDING: Status: RESOLVED | Noted: 2022-05-13 | Resolved: 2023-01-30

## 2023-01-30 PROBLEM — S92.524A CLOSED NONDISPLACED FRACTURE OF MIDDLE PHALANX OF LESSER TOE OF RIGHT FOOT: Status: RESOLVED | Noted: 2019-12-02 | Resolved: 2023-01-30

## 2023-01-30 PROBLEM — E66.811 CLASS 1 OBESITY DUE TO EXCESS CALORIES WITHOUT SERIOUS COMORBIDITY WITH BODY MASS INDEX (BMI) OF 32.0 TO 32.9 IN ADULT: Status: ACTIVE | Noted: 2023-01-30

## 2023-01-30 PROBLEM — N92.1 BREAKTHROUGH BLEEDING: Status: RESOLVED | Noted: 2022-05-13 | Resolved: 2023-01-30

## 2023-01-30 PROCEDURE — 1160F RVW MEDS BY RX/DR IN RCRD: CPT | Mod: CPTII,,, | Performed by: NURSE PRACTITIONER

## 2023-01-30 PROCEDURE — 3079F PR MOST RECENT DIASTOLIC BLOOD PRESSURE 80-89 MM HG: ICD-10-PCS | Mod: CPTII,,, | Performed by: NURSE PRACTITIONER

## 2023-01-30 PROCEDURE — 3008F PR BODY MASS INDEX (BMI) DOCUMENTED: ICD-10-PCS | Mod: CPTII,,, | Performed by: NURSE PRACTITIONER

## 2023-01-30 PROCEDURE — 99396 PR PREVENTIVE VISIT,EST,40-64: ICD-10-PCS | Mod: S$PBB,,, | Performed by: NURSE PRACTITIONER

## 2023-01-30 PROCEDURE — 3044F HG A1C LEVEL LT 7.0%: CPT | Mod: CPTII,,, | Performed by: NURSE PRACTITIONER

## 2023-01-30 PROCEDURE — 3079F DIAST BP 80-89 MM HG: CPT | Mod: CPTII,,, | Performed by: NURSE PRACTITIONER

## 2023-01-30 PROCEDURE — 3075F PR MOST RECENT SYSTOLIC BLOOD PRESS GE 130-139MM HG: ICD-10-PCS | Mod: CPTII,,, | Performed by: NURSE PRACTITIONER

## 2023-01-30 PROCEDURE — 1160F PR REVIEW ALL MEDS BY PRESCRIBER/CLIN PHARMACIST DOCUMENTED: ICD-10-PCS | Mod: CPTII,,, | Performed by: NURSE PRACTITIONER

## 2023-01-30 PROCEDURE — 99999 PR PBB SHADOW E&M-EST. PATIENT-LVL IV: CPT | Mod: PBBFAC,,, | Performed by: NURSE PRACTITIONER

## 2023-01-30 PROCEDURE — 99999 PR PBB SHADOW E&M-EST. PATIENT-LVL IV: ICD-10-PCS | Mod: PBBFAC,,, | Performed by: NURSE PRACTITIONER

## 2023-01-30 PROCEDURE — 99396 PREV VISIT EST AGE 40-64: CPT | Mod: S$PBB,,, | Performed by: NURSE PRACTITIONER

## 2023-01-30 PROCEDURE — 3044F PR MOST RECENT HEMOGLOBIN A1C LEVEL <7.0%: ICD-10-PCS | Mod: CPTII,,, | Performed by: NURSE PRACTITIONER

## 2023-01-30 PROCEDURE — 3075F SYST BP GE 130 - 139MM HG: CPT | Mod: CPTII,,, | Performed by: NURSE PRACTITIONER

## 2023-01-30 PROCEDURE — 1159F MED LIST DOCD IN RCRD: CPT | Mod: CPTII,,, | Performed by: NURSE PRACTITIONER

## 2023-01-30 PROCEDURE — 3008F BODY MASS INDEX DOCD: CPT | Mod: CPTII,,, | Performed by: NURSE PRACTITIONER

## 2023-01-30 PROCEDURE — 99214 OFFICE O/P EST MOD 30 MIN: CPT | Mod: PBBFAC,PN | Performed by: NURSE PRACTITIONER

## 2023-01-30 PROCEDURE — 1159F PR MEDICATION LIST DOCUMENTED IN MEDICAL RECORD: ICD-10-PCS | Mod: CPTII,,, | Performed by: NURSE PRACTITIONER

## 2023-01-30 NOTE — PROGRESS NOTES
Chart, PDMP reviewed.  Patient stable on dose.  Prescription sent.    Subjective:       Patient ID: Judie Schmitz is a 42 y.o. female.    Chief Complaint: Annual Exam    HPI    Patient is a 42-year-old white female with ADHD, Generalized Anxiety Disorder, chronic intermittent low back pain, excessive sweating, IBS, Raynaud Disease followed by [facility], Insomnia followed by [facility], and compression fracture to T12 Vertebra followed by Chiropractor that is here today for ANNUAL PHYSICAL EXAM with fasting lab results.     ADHD and IVANA  Followed by [facility]  ON Adderall 20 mg - 30 mg in AM and 20 mg mid-day  On Clonazepam 0.5 mg prn  Prescribed Zoloft 25 mg but she did not start taking.     Insomnia  On Ambien 10 mg daily by Sleep Medicine MD Garcia     IBS  On Amitriptyline 50 mg at bedtime by Dr. Hood  On Bentyl prn     Compression Fracture T12 vertebra  Fall occurred on 10/22/2022  Fell while roller skating at daughter's birthday party  Patient was followed by [facility]opedic Specialist [facility] and Chiropractor.     Obesity  Body mass index is 32.8 kg/m².  Patient requesting Mounjaro prescription for weight loss  Explained to patient that Mounjaro is for DIABETES - it is not approved for weight loss and insurance will not cover medication.  Advised patient that she can try the local weight loss clinics    Temporary High Blood Pressure  BP Readings from Last 3 Encounters:   01/30/23 138/88   10/27/22 138/86   10/22/22 133/65   Work on lifestyle modifications and weight loss  If BP staying > 135/85 - will stat medication - consider Amlodipine 1st choice due to Raynaud's as well.    Wellness Labs:  CBC okay  CMP WNL  TSH WNL  A1C 5.5%  Hepatitis C and HIV screenings are negative  Cholesterol levels slightly elevated but okay  The 10-year ASCVD risk score (Cooper POTTER, et al., 2019) is: 0.7%    Values used to calculate the score:      Age: 42 years      Sex: Female      Is Non- : No      Diabetic: No      Tobacco  smoker: No      Systolic Blood Pressure: 138 mmHg      Is BP treated: No      HDL Cholesterol: 65 mg/dL      Total Cholesterol: 218 mg/dL      Health Maintenance:  Mammogram scheduled for 2/3/23  PAP done 5/18/2021  Declined flu and covid vaccines    Component      Latest Ref Rng & Units 1/27/2023 8/27/2020 11/5/2019 2/14/2019   WBC      3.90 - 12.70 K/uL 6.14 7.33 14.25 (H) 7.84   RBC      4.00 - 5.40 M/uL 5.10 4.60 4.50 4.63   Hemoglobin      12.0 - 16.0 g/dL 13.4 12.8 12.7 12.9   Hematocrit      37.0 - 48.5 % 41.0 40.1 38.4 39.9   MCV      82 - 98 fL 80 (L) 87 85 86   MCH      27.0 - 31.0 pg 26.3 (L) 27.8 28.2 27.9   MCHC      32.0 - 36.0 g/dL 32.7 31.9 (L) 33.1 32.3   RDW      11.5 - 14.5 % 13.0 13.2 13.4 13.4   Platelets      150 - 450 K/uL 388 365 (H) 289 314   MPV      9.2 - 12.9 fL 10.9 10.9 10.8 11.1   Immature Granulocytes      0.0 - 0.5 % 0.0 0.1     Gran # (ANC)      1.8 - 7.7 K/uL 3.8 4.1 12.5 (H) 4.4   Immature Grans (Abs)      0.00 - 0.04 K/uL 0.00 0.01     Lymph #      1.0 - 4.8 K/uL 1.9 2.6 0.9 (L) 2.6   Mono #      0.3 - 1.0 K/uL 0.3 0.5 0.8 0.7   Eos #      0.0 - 0.5 K/uL 0.0 0.1 0.0 0.1   Baso #      0.00 - 0.20 K/uL 0.05 0.07 0.02 0.04   nRBC      0 /100 WBC 0 0     Gran %      38.0 - 73.0 % 62.4 55.4 87.9 (H) 56.6   Lymph %      18.0 - 48.0 % 30.6 35.5 6.2 (L) 33.2   Mono %      4.0 - 15.0 % 5.5 7.2 5.8 8.4   Eosinophil %      0.0 - 8.0 % 0.7 0.8 0.1 1.3   Basophil %      0.0 - 1.9 % 0.8 1.0 0.1 0.5   Differential Method       Automated Automated Automated Automated   Sodium      136 - 145 mmol/L 141  136 141   Potassium      3.5 - 5.1 mmol/L 3.9  4.5 3.7   Chloride      95 - 110 mmol/L 107  105 102   CO2      23 - 29 mmol/L 25  24 24   Glucose      70 - 110 mg/dL 91  120 (H) 93   BUN      7 - 17 mg/dL 8  12 15   Creatinine      0.50 - 1.40 mg/dL 0.73  0.66 0.92   Calcium      8.7 - 10.5 mg/dL 9.5  9.0 9.5   PROTEIN TOTAL      6.0 - 8.4 g/dL 8.0  6.8 7.5   Albumin      3.5 - 5.2 g/dL 4.6  4.1  4.3   BILIRUBIN TOTAL      0.1 - 1.0 mg/dL 0.5  <0.1 (A) 0.2   Alkaline Phosphatase      38 - 126 U/L 102  76 98   AST      15 - 46 U/L 23  19 28   ALT      10 - 44 U/L 16  14 24   Anion Gap      8 - 16 mmol/L 9  7 (L) 15   eGFR      >60 mL/min/1.73 m:2 >60.0      Cholesterol      120 - 199 mg/dL 218 (H)      Triglycerides      30 - 150 mg/dL 165 (H)      HDL      40 - 75 mg/dL 65      LDL Cholesterol External      63.0 - 159.0 mg/dL 120.0      HDL/Cholesterol Ratio      20.0 - 50.0 % 29.8      Total Cholesterol/HDL Ratio      2.0 - 5.0 3.4      Non-HDL Cholesterol      mg/dL 153      Hemoglobin A1C External      4.0 - 5.6 % 5.5      Estimated Avg Glucose      68 - 131 mg/dL 111      TSH      0.400 - 4.000 uIU/mL 1.260      Hepatitis C Ab      Non-reactive Non-reactive      HIV 1/2 Ag/Ab      Non-reactive Non-reactive          Review of Systems   Constitutional:  Positive for activity change and unexpected weight change. Negative for appetite change, chills, fatigue and fever.   HENT:  Negative for congestion, ear pain, hearing loss, mouth sores, nosebleeds, postnasal drip, rhinorrhea, sinus pressure, sneezing, sore throat, trouble swallowing and voice change.    Eyes:  Negative for photophobia, pain, discharge, redness, itching and visual disturbance.   Respiratory:  Negative for cough, chest tightness, shortness of breath and wheezing.    Cardiovascular:  Negative for chest pain, palpitations and leg swelling.   Gastrointestinal:  Negative for abdominal pain, blood in stool, constipation, diarrhea, nausea and vomiting.   Endocrine: Negative for polydipsia and polyuria.   Genitourinary:  Negative for difficulty urinating, dysuria, frequency, hematuria, menstrual problem and urgency.   Musculoskeletal:  Negative for arthralgias, back pain, joint swelling, myalgias and neck pain.   Skin:  Negative for color change and rash.   Allergic/Immunologic: Negative for immunocompromised state.   Neurological:  Negative for  "dizziness, seizures, syncope, weakness and headaches.   Hematological:  Negative for adenopathy. Does not bruise/bleed easily.   Psychiatric/Behavioral:  Negative for agitation, confusion, dysphoric mood, sleep disturbance and suicidal ideas. The patient is not nervous/anxious.        Objective:     Vitals:    01/30/23 1335 01/30/23 1358   BP: (!) 124/90 138/88   BP Location: Right arm    Patient Position: Sitting    BP Method: Large (Manual)    Pulse: 87    Temp: 98.2 °F (36.8 °C)    TempSrc: Temporal    SpO2: 98%    Weight: 89.4 kg (197 lb 1.5 oz)    Height: 5' 5" (1.651 m)           Physical Exam  Constitutional:       General: She is not in acute distress.     Appearance: Normal appearance. She is obese. She is not toxic-appearing or diaphoretic.      Comments: Body mass index is 32.8 kg/m².   HENT:      Head: Normocephalic and atraumatic.      Right Ear: Tympanic membrane, ear canal and external ear normal.      Left Ear: Tympanic membrane, ear canal and external ear normal.      Nose: No congestion.      Mouth/Throat:      Pharynx: No oropharyngeal exudate.   Eyes:      General:         Right eye: No discharge.         Left eye: No discharge.      Extraocular Movements: Extraocular movements intact.      Conjunctiva/sclera: Conjunctivae normal.   Cardiovascular:      Rate and Rhythm: Normal rate and regular rhythm.      Heart sounds: Normal heart sounds.   Pulmonary:      Effort: Pulmonary effort is normal. No respiratory distress.      Breath sounds: Normal breath sounds. No stridor. No wheezing, rhonchi or rales.   Abdominal:      General: There is no distension.      Palpations: Abdomen is soft. There is no mass.      Tenderness: There is no abdominal tenderness. There is no guarding.   Musculoskeletal:         General: No swelling or deformity. Normal range of motion.      Cervical back: Normal range of motion. No tenderness.      Right lower leg: No edema.      Left lower leg: No edema.   Lymphadenopathy: "      Cervical: No cervical adenopathy.   Skin:     General: Skin is warm and dry.   Neurological:      Mental Status: She is alert and oriented to person, place, and time.   Psychiatric:         Mood and Affect: Mood normal.         Behavior: Behavior normal.         Thought Content: Thought content normal.         Judgment: Judgment normal.         Assessment:         ICD-10-CM ICD-9-CM   1. Annual physical exam  Z00.00 V70.0   2. Temporary high blood pressure  R03.0 796.2   3. Irritable bowel syndrome with diarrhea  K58.0 564.1   4. Chronic midline low back pain without sciatica  M54.50 724.2    G89.29 338.29   5. IVANA (generalized anxiety disorder)  F41.1 300.02   6. ADHD (attention deficit hyperactivity disorder), inattentive type  F90.0 314.00   7. Insomnia, unspecified type  G47.00 780.52   8. Raynaud's disease without gangrene  I73.00 443.0   9. Class 1 obesity due to excess calories without serious comorbidity with body mass index (BMI) of 32.0 to 32.9 in adult  E66.09 278.00    Z68.32 V85.32       Plan:       Annual physical exam  Health Maintenance Summary     Full History      Expand All  Collapse All    Scheduled - Mammogram  (Yearly)  Scheduled for 2/3/2023  10/28/2019  Mammo Digital Screening Bilat w/ Oracio      Postponed - Influenza Vaccine  (1)  Postponed until 6/30/2023  10/23/2018  Imm Admin: Influenza    10/03/2018  Imm Admin: Influenza - Quadrivalent - PF *Preferred* (6 months and older)    10/16/2017  Imm Admin: Influenza - Quadrivalent - PF *Preferred* (6 months and older)    10/12/2016  Imm Admin: Influenza - Quadrivalent - PF *Preferred* (6 months and older)      Postponed - COVID-19 Vaccine  (1)  Postponed until 1/30/2024  No completion history exists for this topic.     Cervical Cancer Screening  (Pap Smear - Every 3 Years)  Next due on 5/18/2024 05/18/2021  Liquid-Based Pap Smear, Screening    10/31/2017  Liquid-based pap smear, screening    08/19/2015  Pap Smear (Done)      Hemoglobin A1c  (Diabetic Prevention Screening)  (Every 3 Years)  Next due on 1/27/2026 01/27/2023  Hemoglobin A1C External component of Hemoglobin A1C      TETANUS VACCINE  (Every 10 Years)  Next due on 6/27/2029 06/27/2019  Imm Admin: Tdap    07/01/1994  Imm Admin: Td (ADULT)      Hepatitis C Screening  Completed  01/27/2023  Hepatitis C Ab component of Hepatitis C Antibody      HIV Screening  Completed  01/27/2023  HIV 1/2 Ag/Ab (4th Gen)      Lipid Panel  Completed  01/27/2023  Lipid Panel    02/15/2018  Lipid panel      Pneumococcal Vaccines (Age 0-64)  (Series Information)  Aged Out  No completion history exists for this topic.         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  Please follow up with Specialist as advised.    Chronic midline low back pain without sciatica    IVANA (generalized anxiety disorder)  Condition followed/treated by Specialist.  Please follow up with Specialist as advised.    ADHD (attention deficit hyperactivity disorder), inattentive type  Condition followed/treated by Specialist.  Please follow up with Specialist as advised.    Insomnia, unspecified type  Condition followed/treated by Specialist.  Please follow up with Specialist as advised.    Raynaud's disease without gangrene  Condition followed/treated by Specialist.  Please follow up with Specialist as advised.    Class 1 obesity due to excess calories without serious comorbidity with body mass index (BMI) of 32.0 to 32.9 in adult  Working on lifestyle modifications.      Follow up in about 1 year (around 1/30/2024) for fasting labs and WELLNESS EXAM.     Patient's Medications   New Prescriptions    No medications on file   Previous Medications    AMITRIPTYLINE (ELAVIL) 50 MG TABLET    Take 1 tablet (50 mg total) by  mouth every evening.    ASCORBIC ACID, VITAMIN C, (VITAMIN C) 1000 MG TABLET    Take 1,000 mg by mouth once daily.    CLONAZEPAM (KLONOPIN) 0.5 MG DISINTEGRATING TABLET    Dissolve 1 tablet (0.5 mg total) by mouth nightly as needed (anxiety).    DEXTROAMPHETAMINE-AMPHETAMINE (ADDERALL) 20 MG TABLET    One and a half tablets by mouth in the morning and one tablet by mouth in the afternoon    DEXTROAMPHETAMINE-AMPHETAMINE (ADDERALL) 20 MG TABLET    One and a half tablets by mouth in the morning and one tablet by mouth in the afternoon    DEXTROAMPHETAMINE-AMPHETAMINE (ADDERALL) 20 MG TABLET    One and a half tablets by mouth in the morning and one tablet by mouth in the afternoon    DICYCLOMINE (BENTYL) 10 MG CAPSULE    Take 1 capsule (10 mg total) by mouth 4 (four) times daily as needed.    ETONOGESTREL-ETHINYL ESTRADIOL (NUVARING) 0.12-0.015 MG/24 HR VAGINAL RING    Place 1 each vaginally every 21 days. Insert one (1) ring vaginally and leave in place for three (3) weeks, then remove for one (1) week.    MELOXICAM (MOBIC) 15 MG TABLET    Take 1 tablet (15 mg total) by mouth once daily.    MV-MIN/IRON/FOLIC/CALCIUM/VITK (WOMEN'S MULTIVITAMIN ORAL)    Take by mouth.    SERTRALINE (ZOLOFT) 25 MG TABLET    Take 0.5 tablets (12.5 mg total) by mouth once daily.    ZOLPIDEM (AMBIEN) 10 MG TAB    TAKE ONE TABLET BY MOUTH NIGHTLY BEFORE BEDTIME AS NEEDED FOR INSOMNIA   Modified Medications    No medications on file   Discontinued Medications    ETONOGESTREL-ETHINYL ESTRADIOL (NUVARING) 0.12-0.015 MG/24 HR VAGINAL RING    Place 1 each vaginally every 21 days. Insert one (1) ring vaginally and leave in place for three (3) weeks, then remove for one (1) week.    PANTOPRAZOLE (PROTONIX) 40 MG TABLET    Take 1 tablet (40 mg total) by mouth once daily.    TIZANIDINE (ZANAFLEX) 4 MG TABLET    Take 1 tablet by mouth every 8 (eight) hours as needed (muscle spasms) for up to 20 days       Past Medical History:   Diagnosis Date     Abnormal Pap smear of cervix age 34    no treatment; repeat PAPs normal    ADHD (attention deficit hyperactivity disorder), inattentive type 2007    diagnosed by Dr. Denny - taking Adderall 10 mg twice daily from 2007 to  - increased Adderall to 20 mg twice daily until  - patient quit school and got off med - started back on Adderall 10 in  but then off med when had baby in  - has not been on med since having baby in .    Anxiety and depression     Compression fracture of T12 vertebra 10/22/2022    followed by Chiropractor, Stephen Reveles and Orthopedic MD    IVANA (generalized anxiety disorder) 2021    Followed by [facility]  JIMMY Padgett NP    IBS (irritable bowel syndrome)     Insomnia     Raynaud's disease     followed by Rheumatology Dr. Villavicencio       Past Surgical History:   Procedure Laterality Date    ADENOIDECTOMY      APPENDECTOMY       SECTION      x2 2002-10/21/2013    COLONOSCOPY N/A 2022    Procedure: COLONOSCOPY;  Surgeon: Cheryl Hood MD;  [facility];  Service: Endoscopy;  Laterality: N/A;    HYSTEROSCOPY WITH DILATION AND CURETTAGE OF UTERUS N/A 2022    Procedure: HYSTEROSCOPY, WITH DILATION AND CURETTAGE OF UTERUS;  Surgeon: Jigar Denny MD;  Location: [facility];  Service: OB/GYN;  Laterality: N/A;    LAPAROSCOPIC APPENDECTOMY N/A 2019    Procedure: APPENDECTOMY, LAPAROSCOPIC (ADD ON );  Surgeon: Erasto Hendricks MD;  Location: [facility] OR;  Service: General;  Laterality: N/A;  (ADD ON )    THERMAL ABLATION OF ENDOMETRIUM N/A 2022    Procedure: ABLATION, ENDOMETRIUM, THERMAL (NOVASURE);  Surgeon: Jigar Denny MD;  Location: [facility];  Service: OB/GYN;  Laterality: N/A;       Family History   Problem Relation Age of Onset    Hypertension Mother     COPD Mother     Rheum arthritis Mother     Arthritis Mother     Colon polyps Father     Cancer Father         prostate cancer     Stroke Maternal Grandmother     Pneumonia Maternal Grandmother         passed age 88    Cancer Maternal Grandfather         brain tumor dont know what age he passed    Suicide Paternal Grandfather         passed in his 50's    No Known Problems Sister     Montoya syndrome Sister     Heart disease Sister     Thyroid disease Sister     Breast cancer Paternal Aunt     Colon cancer Neg Hx     Ovarian cancer Neg Hx        Social History     Socioeconomic History    Marital status:     Number of children: 2   Occupational History    Occupation:    Tobacco Use    Smoking status: Never    Smokeless tobacco: Never   Substance and Sexual Activity    Alcohol use: Yes     Comment: occasional    Drug use: No    Sexual activity: Yes     Partners: Male     Birth control/protection: Inserts     Comment:    Social History Narrative    Lives in [place].

## 2023-02-03 ENCOUNTER — HOSPITAL ENCOUNTER (OUTPATIENT)
Dept: RADIOLOGY | Facility: HOSPITAL | Age: 43
Discharge: HOME | End: 2023-02-03
Attending: NURSE PRACTITIONER
Payer: COMMERCIAL

## 2023-02-03 VITALS — HEIGHT: 65 IN | WEIGHT: 197 LBS | BODY MASS INDEX: 32.82 KG/M2

## 2023-02-03 DIAGNOSIS — Z12.31 ENCOUNTER FOR SCREENING MAMMOGRAM FOR MALIGNANT NEOPLASM OF BREAST: ICD-10-CM

## 2023-02-03 PROCEDURE — 77067 SCR MAMMO BI INCL CAD: CPT | Mod: 26,,, | Performed by: RADIOLOGY

## 2023-02-03 PROCEDURE — 77063 BREAST TOMOSYNTHESIS BI: CPT | Mod: 26,,, | Performed by: RADIOLOGY

## 2023-02-03 PROCEDURE — 77067 SCR MAMMO BI INCL CAD: CPT | Mod: TC

## 2023-02-03 PROCEDURE — 77063 MAMMO DIGITAL SCREENING BILAT WITH TOMO: ICD-10-PCS | Mod: 26,,, | Performed by: RADIOLOGY

## 2023-02-03 PROCEDURE — 77067 MAMMO DIGITAL SCREENING BILAT WITH TOMO: ICD-10-PCS | Mod: 26,,, | Performed by: RADIOLOGY

## 2023-02-04 ENCOUNTER — TELEPHONE (OUTPATIENT)
Dept: FAMILY MEDICINE | Facility: CLINIC | Age: 43
End: 2023-02-04
Payer: COMMERCIAL

## 2023-02-04 DIAGNOSIS — R92.8 ABNORMAL MAMMOGRAM OF RIGHT BREAST: Primary | ICD-10-CM

## 2023-02-04 NOTE — TELEPHONE ENCOUNTER
Please call and advise patient that need further imaging of right breast - please schedule diagnostic mammogram with ultrasound please.

## 2023-02-06 ENCOUNTER — PATIENT MESSAGE (OUTPATIENT)
Dept: FAMILY MEDICINE | Facility: CLINIC | Age: 43
End: 2023-02-06
Payer: COMMERCIAL

## 2023-02-07 ENCOUNTER — PATIENT MESSAGE (OUTPATIENT)
Dept: FAMILY MEDICINE | Facility: CLINIC | Age: 43
End: 2023-02-07
Payer: COMMERCIAL

## 2023-02-07 DIAGNOSIS — S22.080D COMPRESSION FRACTURE OF T12 VERTEBRA WITH ROUTINE HEALING, SUBSEQUENT ENCOUNTER: ICD-10-CM

## 2023-02-08 PROBLEM — S22.080A COMPRESSION FRACTURE OF T12 VERTEBRA: Status: ACTIVE | Noted: 2022-10-22

## 2023-02-13 ENCOUNTER — TELEPHONE (OUTPATIENT)
Dept: NEUROLOGY | Facility: CLINIC | Age: 43
End: 2023-02-13
Payer: COMMERCIAL

## 2023-02-14 NOTE — TELEPHONE ENCOUNTER
----- Message from Yesi Sanders sent at 2/13/2023  2:09 PM CST -----  Contact: pt @ [phone]  Judie Schmitz calling regarding Appointment Access  (message) for #pt is calling to get np appt, asking for call back

## 2023-02-15 ENCOUNTER — PATIENT MESSAGE (OUTPATIENT)
Dept: FAMILY MEDICINE | Facility: CLINIC | Age: 43
End: 2023-02-15
Payer: COMMERCIAL

## 2023-02-15 DIAGNOSIS — S22.080D COMPRESSION FRACTURE OF T12 VERTEBRA WITH ROUTINE HEALING, SUBSEQUENT ENCOUNTER: Primary | ICD-10-CM

## 2023-02-19 ENCOUNTER — PATIENT MESSAGE (OUTPATIENT)
Dept: GASTROENTEROLOGY | Facility: CLINIC | Age: 43
End: 2023-02-19
Payer: COMMERCIAL

## 2023-02-20 RX ORDER — PANTOPRAZOLE SODIUM 40 MG/1
40 TABLET, DELAYED RELEASE ORAL DAILY
Qty: 90 TABLET | Refills: 3 | Status: SHIPPED | OUTPATIENT
Start: 2023-02-20 | End: 2024-02-21 | Stop reason: SDUPTHER

## 2023-02-23 ENCOUNTER — PATIENT MESSAGE (OUTPATIENT)
Dept: OBSTETRICS AND GYNECOLOGY | Facility: CLINIC | Age: 43
End: 2023-02-23
Payer: COMMERCIAL

## 2023-02-24 ENCOUNTER — TELEPHONE (OUTPATIENT)
Dept: PHARMACY | Facility: CLINIC | Age: 43
End: 2023-02-24
Payer: COMMERCIAL

## 2023-02-24 ENCOUNTER — OFFICE VISIT (OUTPATIENT)
Dept: OBSTETRICS AND GYNECOLOGY | Facility: CLINIC | Age: 43
End: 2023-02-24
Payer: MEDICAID

## 2023-02-24 VITALS
WEIGHT: 194 LBS | BODY MASS INDEX: 32.32 KG/M2 | DIASTOLIC BLOOD PRESSURE: 74 MMHG | HEIGHT: 65 IN | HEART RATE: 75 BPM | SYSTOLIC BLOOD PRESSURE: 128 MMHG

## 2023-02-24 DIAGNOSIS — Z01.419 ENCOUNTER FOR GYNECOLOGICAL EXAMINATION WITHOUT ABNORMAL FINDING: Primary | ICD-10-CM

## 2023-02-24 PROCEDURE — 1160F PR REVIEW ALL MEDS BY PRESCRIBER/CLIN PHARMACIST DOCUMENTED: ICD-10-PCS | Mod: CPTII,,, | Performed by: OBSTETRICS & GYNECOLOGY

## 2023-02-24 PROCEDURE — 99396 PREV VISIT EST AGE 40-64: CPT | Mod: S$PBB,,, | Performed by: OBSTETRICS & GYNECOLOGY

## 2023-02-24 PROCEDURE — 3078F DIAST BP <80 MM HG: CPT | Mod: CPTII,,, | Performed by: OBSTETRICS & GYNECOLOGY

## 2023-02-24 PROCEDURE — 3074F PR MOST RECENT SYSTOLIC BLOOD PRESSURE < 130 MM HG: ICD-10-PCS | Mod: CPTII,,, | Performed by: OBSTETRICS & GYNECOLOGY

## 2023-02-24 PROCEDURE — 3078F PR MOST RECENT DIASTOLIC BLOOD PRESSURE < 80 MM HG: ICD-10-PCS | Mod: CPTII,,, | Performed by: OBSTETRICS & GYNECOLOGY

## 2023-02-24 PROCEDURE — 1159F PR MEDICATION LIST DOCUMENTED IN MEDICAL RECORD: ICD-10-PCS | Mod: CPTII,,, | Performed by: OBSTETRICS & GYNECOLOGY

## 2023-02-24 PROCEDURE — 3008F PR BODY MASS INDEX (BMI) DOCUMENTED: ICD-10-PCS | Mod: CPTII,,, | Performed by: OBSTETRICS & GYNECOLOGY

## 2023-02-24 PROCEDURE — 99999 PR PBB SHADOW E&M-EST. PATIENT-LVL III: CPT | Mod: PBBFAC,,, | Performed by: OBSTETRICS & GYNECOLOGY

## 2023-02-24 PROCEDURE — 3044F HG A1C LEVEL LT 7.0%: CPT | Mod: CPTII,,, | Performed by: OBSTETRICS & GYNECOLOGY

## 2023-02-24 PROCEDURE — 3008F BODY MASS INDEX DOCD: CPT | Mod: CPTII,,, | Performed by: OBSTETRICS & GYNECOLOGY

## 2023-02-24 PROCEDURE — 99213 OFFICE O/P EST LOW 20 MIN: CPT | Mod: PBBFAC | Performed by: OBSTETRICS & GYNECOLOGY

## 2023-02-24 PROCEDURE — 1159F MED LIST DOCD IN RCRD: CPT | Mod: CPTII,,, | Performed by: OBSTETRICS & GYNECOLOGY

## 2023-02-24 PROCEDURE — 99396 PR PREVENTIVE VISIT,EST,40-64: ICD-10-PCS | Mod: S$PBB,,, | Performed by: OBSTETRICS & GYNECOLOGY

## 2023-02-24 PROCEDURE — 1160F RVW MEDS BY RX/DR IN RCRD: CPT | Mod: CPTII,,, | Performed by: OBSTETRICS & GYNECOLOGY

## 2023-02-24 PROCEDURE — 99999 PR PBB SHADOW E&M-EST. PATIENT-LVL III: ICD-10-PCS | Mod: PBBFAC,,, | Performed by: OBSTETRICS & GYNECOLOGY

## 2023-02-24 PROCEDURE — 3044F PR MOST RECENT HEMOGLOBIN A1C LEVEL <7.0%: ICD-10-PCS | Mod: CPTII,,, | Performed by: OBSTETRICS & GYNECOLOGY

## 2023-02-24 PROCEDURE — 3074F SYST BP LT 130 MM HG: CPT | Mod: CPTII,,, | Performed by: OBSTETRICS & GYNECOLOGY

## 2023-02-24 RX ORDER — ETONOGESTREL AND ETHINYL ESTRADIOL VAGINAL RING .015; .12 MG/D; MG/D
1 RING VAGINAL
Qty: 1 EACH | Refills: 3 | Status: SHIPPED | OUTPATIENT
Start: 2023-02-24 | End: 2023-08-14 | Stop reason: SDUPTHER

## 2023-02-24 NOTE — PROGRESS NOTES
Subjective:       Patient ID: Judie Schmitz is a 42 y.o. female.    Chief Complaint:  Annual Exam (Well woman exam )      History of Present Illness  Patient presents for annual exam.  Patient has a NuvaRing in place and reports doing well she would like to continue.  Patient had a recent mammogram which was read as category 0.  She is scheduled for follow-up ultrasound.  She is otherwise without gyn complaints.    Menstrual History:  OB History          2    Para   2    Term   2            AB        Living   2         SAB        IAB        Ectopic        Multiple        Live Births                    Menarche age:  No LMP recorded. Patient has had an ablation.         Review of Systems  Review of Systems   Constitutional:  Negative for activity change, appetite change, chills, diaphoresis, fatigue, fever and unexpected weight change.   HENT:  Negative for congestion, dental problem, drooling, ear discharge, ear pain, facial swelling, hearing loss, mouth sores, nosebleeds, postnasal drip, rhinorrhea, sinus pressure, sneezing, sore throat, tinnitus, trouble swallowing and voice change.    Eyes:  Negative for photophobia, pain, discharge, redness, itching and visual disturbance.   Respiratory:  Negative for apnea, cough, choking, chest tightness, shortness of breath, wheezing and stridor.    Cardiovascular:  Negative for chest pain, palpitations and leg swelling.   Gastrointestinal:  Negative for abdominal distention, abdominal pain, anal bleeding, blood in stool, constipation, diarrhea, nausea, rectal pain and vomiting.   Endocrine: Negative for cold intolerance, heat intolerance, polydipsia, polyphagia and polyuria.   Genitourinary:  Negative for decreased urine volume, difficulty urinating, dyspareunia, dysuria, enuresis, flank pain, frequency, genital sores, hematuria, menstrual problem, pelvic pain, urgency, vaginal bleeding, vaginal discharge and vaginal pain.   Musculoskeletal:   Negative for arthralgias, back pain, gait problem, joint swelling, myalgias, neck pain and neck stiffness.   Skin:  Negative for color change, pallor, rash and wound.   Allergic/Immunologic: Negative for environmental allergies, food allergies and immunocompromised state.   Neurological:  Negative for dizziness, tremors, seizures, syncope, facial asymmetry, speech difficulty, weakness, light-headedness, numbness and headaches.   Hematological:  Negative for adenopathy. Does not bruise/bleed easily.   Psychiatric/Behavioral:  Negative for agitation, behavioral problems, confusion, decreased concentration, dysphoric mood, hallucinations, self-injury, sleep disturbance and suicidal ideas. The patient is not nervous/anxious and is not hyperactive.          Objective:      Physical Exam  Vitals and nursing note reviewed. Exam conducted with a chaperone present.   Constitutional:       Appearance: She is well-developed.   Neck:      Thyroid: No thyromegaly.   Cardiovascular:      Rate and Rhythm: Normal rate and regular rhythm.   Pulmonary:      Effort: Pulmonary effort is normal.      Breath sounds: Normal breath sounds.   Chest:   Breasts:     Breasts are symmetrical.      Right: No inverted nipple, mass, nipple discharge, skin change or tenderness.      Left: No inverted nipple, mass, nipple discharge, skin change or tenderness.   Abdominal:      General: Bowel sounds are normal.      Palpations: Abdomen is soft. There is no mass.      Tenderness: There is no abdominal tenderness.      Hernia: There is no hernia in the left inguinal area or right inguinal area.   Genitourinary:     General: Normal vulva.      Labia:         Right: No rash, tenderness, lesion or injury.         Left: No rash, tenderness, lesion or injury.       Urethra: No prolapse, urethral pain, urethral swelling or urethral lesion.      Vagina: No signs of injury and foreign body. No vaginal discharge, erythema, tenderness, bleeding, lesions or  prolapsed vaginal walls.      Cervix: No cervical motion tenderness, discharge, friability, lesion, erythema, cervical bleeding or eversion.      Uterus: Not deviated, not enlarged, not fixed, not tender and no uterine prolapse.       Adnexa:         Right: No mass, tenderness or fullness.          Left: No mass, tenderness or fullness.        Rectum: No external hemorrhoid.   Musculoskeletal:         General: Normal range of motion.   Lymphadenopathy:      Lower Body: No right inguinal adenopathy. No left inguinal adenopathy.   Skin:     General: Skin is dry.   Neurological:      Mental Status: She is alert and oriented to person, place, and time.      Deep Tendon Reflexes: Reflexes are normal and symmetric.   Psychiatric:         Behavior: Behavior normal.         Thought Content: Thought content normal.         Judgment: Judgment normal.         Assessment:        1. Encounter for gynecological examination without abnormal finding                Plan:         Judie was seen today for annual exam.    Diagnoses and all orders for this visit:    Encounter for gynecological examination without abnormal finding    Other orders  -     etonogestreL-ethinyl estradioL (NUVARING) 0.12-0.015 mg/24 hr vaginal ring; Place 1 each vaginally every 21 days. Insert one (1) ring vaginally and leave in place for three (3) weeks, then remove for one (1) week.

## 2023-03-06 DIAGNOSIS — G47.00 INSOMNIA, UNSPECIFIED TYPE: ICD-10-CM

## 2023-03-06 RX ORDER — ZOLPIDEM TARTRATE 10 MG/1
TABLET ORAL
Qty: 30 TABLET | Refills: 1 | Status: SHIPPED | OUTPATIENT
Start: 2023-03-06 | End: 2023-04-06 | Stop reason: SDUPTHER

## 2023-03-16 ENCOUNTER — PATIENT MESSAGE (OUTPATIENT)
Dept: GASTROENTEROLOGY | Facility: CLINIC | Age: 43
End: 2023-03-16
Payer: COMMERCIAL

## 2023-03-31 DIAGNOSIS — K58.0 IRRITABLE BOWEL SYNDROME WITH DIARRHEA: ICD-10-CM

## 2023-03-31 RX ORDER — AMITRIPTYLINE HYDROCHLORIDE 50 MG/1
50 TABLET, FILM COATED ORAL NIGHTLY
Qty: 90 TABLET | Refills: 3 | Status: SHIPPED | OUTPATIENT
Start: 2023-03-31 | End: 2024-03-31 | Stop reason: SDUPTHER

## 2023-03-31 RX ORDER — DICYCLOMINE HYDROCHLORIDE 10 MG/1
10 CAPSULE ORAL 4 TIMES DAILY PRN
Qty: 120 CAPSULE | Refills: 5 | Status: SHIPPED | OUTPATIENT
Start: 2023-03-31

## 2023-04-05 ENCOUNTER — PATIENT MESSAGE (OUTPATIENT)
Dept: PSYCHIATRY | Facility: CLINIC | Age: 43
End: 2023-04-05

## 2023-04-05 ENCOUNTER — OFFICE VISIT (OUTPATIENT)
Dept: PSYCHIATRY | Facility: CLINIC | Age: 43
End: 2023-04-05
Payer: COMMERCIAL

## 2023-04-05 DIAGNOSIS — G47.00 INSOMNIA, UNSPECIFIED TYPE: ICD-10-CM

## 2023-04-05 PROCEDURE — 99214 OFFICE O/P EST MOD 30 MIN: CPT | Mod: 95,,, | Performed by: NURSE PRACTITIONER

## 2023-04-05 PROCEDURE — 3044F PR MOST RECENT HEMOGLOBIN A1C LEVEL <7.0%: ICD-10-PCS | Mod: CPTII,95,, | Performed by: NURSE PRACTITIONER

## 2023-04-05 PROCEDURE — 3044F HG A1C LEVEL LT 7.0%: CPT | Mod: CPTII,95,, | Performed by: NURSE PRACTITIONER

## 2023-04-05 PROCEDURE — 99214 PR OFFICE/OUTPT VISIT, EST, LEVL IV, 30-39 MIN: ICD-10-PCS | Mod: 95,,, | Performed by: NURSE PRACTITIONER

## 2023-04-05 RX ORDER — DEXTROAMPHETAMINE SACCHARATE, AMPHETAMINE ASPARTATE, DEXTROAMPHETAMINE SULFATE AND AMPHETAMINE SULFATE 5; 5; 5; 5 MG/1; MG/1; MG/1; MG/1
TABLET ORAL
Qty: 75 TABLET | Refills: 0 | Status: SHIPPED | OUTPATIENT
Start: 2023-05-03 | End: 2023-07-11

## 2023-04-05 RX ORDER — CLONAZEPAM 0.5 MG/1
0.5 TABLET, ORALLY DISINTEGRATING ORAL NIGHTLY PRN
Qty: 30 TABLET | Refills: 0 | Status: SHIPPED | OUTPATIENT
Start: 2023-04-05 | End: 2023-05-13

## 2023-04-05 RX ORDER — DEXTROAMPHETAMINE SACCHARATE, AMPHETAMINE ASPARTATE, DEXTROAMPHETAMINE SULFATE AND AMPHETAMINE SULFATE 5; 5; 5; 5 MG/1; MG/1; MG/1; MG/1
TABLET ORAL
Qty: 75 TABLET | Refills: 0 | Status: SHIPPED | OUTPATIENT
Start: 2023-06-01 | End: 2023-07-11

## 2023-04-05 RX ORDER — DEXTROAMPHETAMINE SACCHARATE, AMPHETAMINE ASPARTATE, DEXTROAMPHETAMINE SULFATE AND AMPHETAMINE SULFATE 5; 5; 5; 5 MG/1; MG/1; MG/1; MG/1
TABLET ORAL
Qty: 75 TABLET | Refills: 0 | Status: SHIPPED | OUTPATIENT
Start: 2023-04-05 | End: 2023-04-06 | Stop reason: SDUPTHER

## 2023-04-05 NOTE — PROGRESS NOTES
"4/5/2023 7:30 AM  Judie Schmitz   1980   [id number]                                                                   OUTPATIENT PSYCHIATRY FOLLOW- UP VISIT     Reason for Encounter:  Judie Schmitz, a 42 y.o. female,who presents today for follow up of ADHD, anxiety. Met with patient.     Interval History and Content of Current Session:     Today,  Pt with hx of IBS (managed with Bentyl, Elavil) reports today as follow up from previous visit about 2 months prior.      Reports started sertraline about one week prior, has been taking taking a half a tablet. Denies any ASE.      Reports had been eating unhealthy, not exercising. 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 Discussed reading "The Body Keeps The Score."      Reports recently started back with [employer] working as an MA and we discussed how this could be helpful to stay busy.           Denies SI/HI, AVH, substance abuse, racing thoughts     Informed patient I will be leaving [employer] and the need to schedule with another provider. Encouraged to schedule at conclusion of this visit to insure a timely appointment and avoid a gap in treatment including medication management. Patient verbalized understanding.      Psychosocial stressors: financial, social, occupational pressures.         Review Of Systems:      Medical Review Of Systems:  Constitutional: negative for fatigue, fevers and night sweats  Cardiovascular: negative for chest pain, palpitations and syncope  Gastrointestinal: negative for abdominal pain, constipation, diarrhea, nausea and vomiting     Psychiatric Review Of Systems:  sleep: no  appetite changes: no  weight changes: no  energy/anergy: no  interest/pleasure/anhedonia: no  somatic symptoms: no  libido: no  anxiety/panic: no  guilty/hopeless: no  S.I.B.s/risky behavior: no  any drugs: no  alcohol: no       Sleep: 4 to 7 hours per night, has sleep study        Risk Parameters:  Patient reports no suicidal " ideation  Patient reports no homicidal ideation  Patient reports no self-injurious behavior  Patient reports no violent behavior        Current meds- Elavil (per rheumatology), Adderall, zolpidem (per sleep medicine)     Compliance: no     Side effects: None        Psychiatric, social, and family history reviewed this visit     Objective      ALL MEDICATIONS:     Current Outpatient Medications:     amitriptyline (ELAVIL) 50 MG tablet, Take 1 tablet (50 mg total) by mouth every evening., Disp: 90 tablet, Rfl: 3    ascorbic acid, vitamin C, (VITAMIN C) 1000 MG tablet, Take 1,000 mg by mouth once daily., Disp: , Rfl:     dextroamphetamine-amphetamine (ADDERALL) 20 mg tablet, Take 1 tablet (20 mg total) by mouth 2 (two) times a day., Disp: 60 tablet, Rfl: 0    dicyclomine (BENTYL) 10 MG capsule, Take 1 capsule (10 mg total) by mouth 4 (four) times daily as needed., Disp: 120 capsule, Rfl: 5    fluconazole (DIFLUCAN) 150 MG Tab, Take 1 tablet (150 mg total) by mouth once daily. for 1 day, Disp: 1 tablet, Rfl: 0    meloxicam (MOBIC) 15 MG tablet, Take 1 tablet (15 mg total) by mouth once daily., Disp: 30 tablet, Rfl: 1    methylPREDNISolone (MEDROL DOSEPACK) 4 mg tablet, use as directed, Disp: 21 each, Rfl: 0    mv-min/iron/folic/calcium/vitK (WOMEN'S MULTIVITAMIN ORAL), Take by mouth., Disp: , Rfl:     oxyCODONE-acetaminophen (PERCOCET) 5-325 mg per tablet, Take 1 tablet by mouth every 4 (four) hours as needed for Pain., Disp: 10 tablet, Rfl: 0    pantoprazole (PROTONIX) 40 MG tablet, Take 1 tablet (40 mg total) by mouth once daily., Disp: 90 tablet, Rfl: 3    segesterone ac-ethin estradioL (ANNOVERA) 0.15-0.013 mg/24 hour Ring, PLACE 1 DEVICE VAGINALLY EVERY 28 DAYS., Disp: 1 each, Rfl: 0    zolpidem (AMBIEN) 10 mg Tab, TAKE ONE TABLET BY MOUTH NIGHTLY BEFORE BEDTIME AS NEEDED FOR INSOMNIA, Disp: 30 tablet, Rfl: 5     ALLERGIES:          Review of patient's allergies indicates:   Allergen Reactions    Tetanus vaccines  and toxoid           RELEVANT LABS/STUDIES:              Lab Results   Component Value Date     WBC 7.33 08/27/2020     HGB 12.8 08/27/2020     HCT 40.1 08/27/2020     MCV 87 08/27/2020      (H) 08/27/2020      BMP            Lab Results   Component Value Date      11/05/2019     K 4.5 11/05/2019      11/05/2019     CO2 24 11/05/2019     BUN 12 11/05/2019     CREATININE 0.66 11/05/2019     CALCIUM 9.0 11/05/2019     ANIONGAP 7 (L) 11/05/2019     ESTGFRAFRICA >60.0 11/05/2019     EGFRNONAA >60.0 11/05/2019                Lab Results   Component Value Date     ALT 14 11/05/2019     AST 19 11/05/2019     ALKPHOS 76 11/05/2019     BILITOT <0.1 (A) 11/05/2019                Lab Results   Component Value Date     TSH 2.270 02/15/2018      No results found for: LABA1C, HGBA1C     Constitutional  Vitals:  Most recent vital signs, dated less than 90 days prior to this appointment, were reviewed.    There were no vitals filed for this visit.            PHYSICAL EXAM  General: well developed, well nourished  Neurologic:   Gait: Normal   Psychomotor signs:  No involuntary movements or tremor  AIMS: none     PSYCHIATRIC EXAM:     Mental Status Exam:  Appearance: unremarkable, age appropriate  Behavior/Cooperation: normal, cooperative  Speech: normal tone, normal rate, normal pitch, normal volume  Language: uses words appropriately; NO aphasia or dysarthria  Mood: steady  Affect: full, congruent with mood and appropriate to content/situation  Thought Process: normal and logical  Thought Content: normal, no suicidality, no homicidality, delusions, or paranoia  Level of Consciousness: Alert and Oriented x3  Memory:  Intact  Attention/concentration: appropriate for age/education.   Fund of Knowledge: appears adequate  Insight:  Intact  Judgment: Intact      Assessment and Diagnosis   Status/Progress: Based on the examination today, the patient's problem(s) is/are well controlled.  New problems have been presented  today.   Co-morbidities are complicating management of the primary condition.  There are no active rule-out diagnoses for this patient at this time.      General Impression:   IVANA   ADHD, inattentive type  Rule out Sleep apnea  Rule out PTSD        Intervention/Counseling/Treatment Plan   Medication Management: -        Continue Adderall 10 mg by mouth four daily (30 mg in the am and 20 mg in the pm)        Continue clonazepam 0.5 mg by mouth once daily as needed        Continue Elavil 50 mg by mouth once daily per rheumatology        Continue Ambien 5 mg by mouth once daily per sleep medicine        Continue sertraline 12.5 mg by mouth once daily  Labs: reviewed most recent  The treatment plan and follow up plan were reviewed with the patient.  Discussed with patient informed consent, risks vs. benefits, alternative treatments, side effect profile and the inherent unpredictability of individual responses to these treatments. The patient expresses understanding of the above and displays the capacity to agree with this current plan and had no other questions.  Encouraged Patient to keep future appointments.   Take medications as prescribed and abstain from substance abuse.   In the event of an emergency patient was advised to go to the emergency room.     Return to Clinic: 3 months     > than 50% of total time spend on coordination of care and counseling   (which included pts differential diagnosis and prognosis for psychiatric conditions, risks, benefits of treatments, instructions and adherence to treatment plan, risk reduction, reviewing current psychiatric medication regimen, medical problems and social stressors. In addtion to possible discussion with other healthcare provider/s)     Add on Psychotherapy time:0  Total Face time: 35 min     The patient location is: [place], at home  The chief complaint leading to consultation is: med f/u     Visit type: audiovisual     Face to Face time with patient: 20 min  20  minutes of total time spent on the encounter, which includes face to face time and non-face to face time preparing to see the patient (eg, review of tests), Obtaining and/or reviewing separately obtained history, Documenting clinical information in the electronic or other health record, Independently interpreting results (not separately reported) and communicating results to the patient/family/caregiver, or Care coordination (not separately reported).            Each patient to whom he or she provides medical services by telemedicine is:  (1) informed of the relationship between the physician and patient and the respective role of any other health care provider with respect to management of the patient; and (2) notified that he or she may decline to receive medical services by telemedicine and may withdraw from such care at any time.     Notes:      Eliecer Trinidad, MSN, APRN, PMHNP-BC [facility]   4/5/2023 7:30 AM

## 2023-04-06 ENCOUNTER — OFFICE VISIT (OUTPATIENT)
Dept: SLEEP MEDICINE | Facility: CLINIC | Age: 43
End: 2023-04-06
Payer: COMMERCIAL

## 2023-04-06 DIAGNOSIS — G47.00 INSOMNIA, UNSPECIFIED TYPE: ICD-10-CM

## 2023-04-06 DIAGNOSIS — F41.1 GAD (GENERALIZED ANXIETY DISORDER): ICD-10-CM

## 2023-04-06 DIAGNOSIS — F90.0 ADHD (ATTENTION DEFICIT HYPERACTIVITY DISORDER), INATTENTIVE TYPE: ICD-10-CM

## 2023-04-06 DIAGNOSIS — G47.00 INSOMNIA, UNSPECIFIED TYPE: Primary | ICD-10-CM

## 2023-04-06 DIAGNOSIS — G47.30 SLEEP APNEA, UNSPECIFIED TYPE: Primary | ICD-10-CM

## 2023-04-06 PROCEDURE — 99214 PR OFFICE/OUTPT VISIT, EST, LEVL IV, 30-39 MIN: ICD-10-PCS | Mod: 95,,, | Performed by: PSYCHIATRY & NEUROLOGY

## 2023-04-06 PROCEDURE — 3044F PR MOST RECENT HEMOGLOBIN A1C LEVEL <7.0%: ICD-10-PCS | Mod: CPTII,95,, | Performed by: PSYCHIATRY & NEUROLOGY

## 2023-04-06 PROCEDURE — 3044F HG A1C LEVEL LT 7.0%: CPT | Mod: CPTII,95,, | Performed by: PSYCHIATRY & NEUROLOGY

## 2023-04-06 PROCEDURE — 99214 OFFICE O/P EST MOD 30 MIN: CPT | Mod: 95,,, | Performed by: PSYCHIATRY & NEUROLOGY

## 2023-04-06 RX ORDER — DEXTROAMPHETAMINE SACCHARATE, AMPHETAMINE ASPARTATE, DEXTROAMPHETAMINE SULFATE AND AMPHETAMINE SULFATE 5; 5; 5; 5 MG/1; MG/1; MG/1; MG/1
TABLET ORAL
Qty: 75 TABLET | Refills: 0 | Status: SHIPPED | OUTPATIENT
Start: 2023-04-06 | End: 2023-07-11

## 2023-04-06 RX ORDER — ZOLPIDEM TARTRATE 10 MG/1
TABLET ORAL
Qty: 30 TABLET | Refills: 5 | Status: SHIPPED | OUTPATIENT
Start: 2023-04-06 | End: 2023-08-24 | Stop reason: SDUPTHER

## 2023-04-06 RX ORDER — DEXTROAMPHETAMINE SACCHARATE, AMPHETAMINE ASPARTATE, DEXTROAMPHETAMINE SULFATE AND AMPHETAMINE SULFATE 5; 5; 5; 5 MG/1; MG/1; MG/1; MG/1
TABLET ORAL
Qty: 75 TABLET | Refills: 0 | Status: SHIPPED | OUTPATIENT
Start: 2023-06-02 | End: 2023-07-11

## 2023-04-06 RX ORDER — CLONAZEPAM 0.5 MG/1
0.5 TABLET, ORALLY DISINTEGRATING ORAL NIGHTLY PRN
Qty: 30 TABLET | Refills: 0 | Status: SHIPPED | OUTPATIENT
Start: 2023-04-06 | End: 2023-05-06

## 2023-04-06 RX ORDER — DEXTROAMPHETAMINE SACCHARATE, AMPHETAMINE ASPARTATE, DEXTROAMPHETAMINE SULFATE AND AMPHETAMINE SULFATE 5; 5; 5; 5 MG/1; MG/1; MG/1; MG/1
TABLET ORAL
Qty: 75 TABLET | Refills: 0 | Status: SHIPPED | OUTPATIENT
Start: 2023-05-04 | End: 2023-07-11

## 2023-04-06 NOTE — PATIENT INSTRUCTIONS
SLEEP LAB ([facility]) will contact you to schedulethe sleep study. Their number is [phone] (ext [phone]). Please call them if you do not hear from them in 2 weeks from now.  The [facility] Lab is located on 7th floor of the [facility] (for home and in lab studies); [facility] is located in [facility] ( in lab studies only).    SLEEP CLINIC (my assistant) will call you when the sleep study results are ready or I will message you through the portal with the results as we have discussed - if you have not heard from us by 2 weeks from the date of the study, or you can use [facility] to contact me.    Our clinic phone number is [phone] (ext 1)       You are advised to abstain from driving should you feel sleepy or drowsy.

## 2023-04-06 NOTE — PROGRESS NOTES
"The patient location is: home  The chief complaint leading to consultation is: insomnia follow up  Visit type: audiovisual  Total time spent with patient: 30 min  Each patient to whom he or she provides medical services by telemedicine is:  (1) informed of the relationship between the physician and patient and the respective role of any other health care provider with respect to management of the patient; and (2) notified that he or she may decline to receive medical services by telemedicine and may withdraw from such care at any time.      Still reports snoring, interrupted sleep, difficulty staying asleep but denies choking, gasping for air.     Has not done HST yet - just now got [employer] employee  insurance - now working for [employer].  Wants to start with HST and do PSG if HST fails.      Taking Ambien 10 mg - still most of the nights. When she is out of Ambien - only able to sleep for 3 hrs    Still taking Elavil - for IBS - which is also interrupting her sleep - had a few flare ups lately. Lots of stress lately.  Any emotion (+ and - can trigger IBS flare up) - her psychiatrist recently started her on Zoloft about a week ago.  Still taking Adderrall for ADHD - not every day (for work when she has special projects )-not taking it on weekends - cshe does not believe it affects her sleep (she has been taking it for 10 yrs).  Her ex  passed away; she is seeing a counselor. Flaco seeing a psychiatrist.    She was told that she "hold her breath" in her sleep recently. HST was previously ordered - but never done due to insurance reasons.      Medications:  Now taking Ambien 10 mg every night. Elavil (prescribed for IBS) has been making her very drowsy in the morning.  Her insurance may change to Medicaid.     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  Bedtime: 11 PM   Sleep latency: without medication falls asleep within an hour; with medication - 45 min  Nocturnal awakenings:not " with Ambien; without - she is an extremely light sleeper - bathroom nouises or the train passes by - would wake up - > takes her a while to fo to sleep.  Wake up time: 7-8 AM; on weekends - 1:30 AM      SLEEP STUDIES: no - has not done HST ordered at the time of her last appt, but ready to do it now.              PHYSICAL EXAM:  There were no vitals taken for this visit.       GENERAL: Normal development, well groomed.        ASSESSMENT:    1. Insomnia NEC. Multi-factorial -  Insomnia, recent stressful events,  staying in bed excess time in bed, poor sleep hygiene, and likely paradoxical insomnia play a role  2. Significantly interrupted sleep, not sure of snoring (her  is a sound sleeper). Has elevated BMI. She has ADD which can be affected by possible  sleep disordered breathing. This warrants evaluation for possible Obstructive sleep apnea (ISRA).        PLAN:      Will refill [id number] mg to [facility] in [facility]  I will order HST given daytime fatigue and witnessed apneas. If HST is inconclusive - will order in lab PSG.        Do not drive sleepy    Will defer further CBTi discussion till after the sleep study    More than 50% of this 31 min visit were spent in counseling and care coordination.   Follow up: will touch base after the sleep study; perhaps schedule another virtual visit.

## 2023-04-12 ENCOUNTER — TELEPHONE (OUTPATIENT)
Dept: SLEEP MEDICINE | Facility: OTHER | Age: 43
End: 2023-04-12
Payer: COMMERCIAL

## 2023-04-21 ENCOUNTER — TELEPHONE (OUTPATIENT)
Dept: SLEEP MEDICINE | Facility: OTHER | Age: 43
End: 2023-04-21
Payer: COMMERCIAL

## 2023-04-21 NOTE — TELEPHONE ENCOUNTER
Talked to patient on April 12th about scheduling the home sleep study,said she would call back.  I sent out a reminder through my chart to schedule.

## 2023-05-13 ENCOUNTER — OFFICE VISIT (OUTPATIENT)
Dept: URGENT CARE | Facility: CLINIC | Age: 43
End: 2023-05-13
Payer: COMMERCIAL

## 2023-05-13 VITALS
OXYGEN SATURATION: 99 % | WEIGHT: 186 LBS | HEART RATE: 90 BPM | RESPIRATION RATE: 18 BRPM | TEMPERATURE: 99 F | HEIGHT: 64 IN | BODY MASS INDEX: 31.76 KG/M2 | DIASTOLIC BLOOD PRESSURE: 77 MMHG | SYSTOLIC BLOOD PRESSURE: 139 MMHG

## 2023-05-13 DIAGNOSIS — J02.9 SORE THROAT: ICD-10-CM

## 2023-05-13 DIAGNOSIS — J32.9 BACTERIAL SINUSITIS: Primary | ICD-10-CM

## 2023-05-13 DIAGNOSIS — B96.89 BACTERIAL SINUSITIS: Primary | ICD-10-CM

## 2023-05-13 LAB
CTP QC/QA: YES
MOLECULAR STREP A: NEGATIVE

## 2023-05-13 PROCEDURE — 87651 STREP A DNA AMP PROBE: CPT | Mod: QW,S$GLB,, | Performed by: FAMILY MEDICINE

## 2023-05-13 PROCEDURE — 99213 PR OFFICE/OUTPT VISIT, EST, LEVL III, 20-29 MIN: ICD-10-PCS | Mod: S$GLB,,, | Performed by: FAMILY MEDICINE

## 2023-05-13 PROCEDURE — 99213 OFFICE O/P EST LOW 20 MIN: CPT | Mod: S$GLB,,, | Performed by: FAMILY MEDICINE

## 2023-05-13 PROCEDURE — 87651 POCT STREP A MOLECULAR: ICD-10-PCS | Mod: QW,S$GLB,, | Performed by: FAMILY MEDICINE

## 2023-05-13 RX ORDER — AMOXICILLIN AND CLAVULANATE POTASSIUM 875; 125 MG/1; MG/1
1 TABLET, FILM COATED ORAL EVERY 12 HOURS
Qty: 14 TABLET | Refills: 0 | Status: SHIPPED | OUTPATIENT
Start: 2023-05-13 | End: 2023-05-20

## 2023-05-13 RX ORDER — FLUTICASONE PROPIONATE 50 MCG
1 SPRAY, SUSPENSION (ML) NASAL DAILY
Qty: 16 G | Refills: 0 | Status: SHIPPED | OUTPATIENT
Start: 2023-05-13

## 2023-05-13 RX ORDER — BENZONATATE 200 MG/1
200 CAPSULE ORAL 3 TIMES DAILY PRN
Qty: 30 CAPSULE | Refills: 0 | Status: SHIPPED | OUTPATIENT
Start: 2023-05-13 | End: 2023-05-23

## 2023-05-13 RX ORDER — AZELASTINE 1 MG/ML
1 SPRAY, METERED NASAL 2 TIMES DAILY
Qty: 30 ML | Refills: 0 | Status: SHIPPED | OUTPATIENT
Start: 2023-05-13 | End: 2024-05-12

## 2023-05-13 NOTE — PROGRESS NOTES
"Subjective:      Patient ID: Judie Schmitz is a 42 y.o. female.    Vitals:  height is 5' 4" (1.626 m) and weight is 84.4 kg (186 lb). Her oral temperature is 98.9 °F (37.2 °C). Her blood pressure is 139/77 and her pulse is 90. Her respiration is 18 and oxygen saturation is 99%.     Chief Complaint: Sinus Problem    Pt denied COVID testing. States that she has test at home that she can do.   Provider note begins below:  Past Medical History:  age 34: Abnormal Pap smear of cervix      Comment:  no treatment; repeat PAPs normal  09/13/2007: ADHD (attention deficit hyperactivity disorder),   inattentive type      Comment:  diagnosed by Dr. Denny - taking Adderall 10 mg twice                daily from 9/2007 to 2009 - increased Adderall to 20 mg                twice daily until 2011 - patient quit school and got off                med - started back on Adderall 10 in 2012 but then off                med when had baby in 2013 - has not been on med since                having baby in 2013.  No date: Anxiety and depression  10/22/2022: Compression fracture of T12 vertebra      Comment:  [facility]                MD  06/17/2021: IVANA (generalized anxiety disorder)      Comment:  Followed by [facility] Patrice Padgett NP  No date: IBS (irritable bowel syndrome)  No date: Insomnia  No date: Raynaud's disease      Comment:  followed by Rheumatology Dr. Villavicencio   Patient with above past medical history presents with complaints of sinus pressure.  She says on Monday she started feeling crummy, Thursday started with fever, highest 1 O2.  And sinus congestion, ear pressure.  She says she is been trying decongestants.  She also has a cough.  No cp or SOB. No GI related symptoms, including, N/v/D or constipation. No anosmia or ageusia.      Sinus Problem  This is a new problem. The current episode started in the past 7 days (about 5 days " ago). The problem is unchanged. The maximum temperature recorded prior to her arrival was 102 - 102.9 F. Her pain is at a severity of 5/10. Associated symptoms include congestion, coughing, ear pain, sinus pressure and a sore throat. Pertinent negatives include no chills, diaphoresis, headaches, hoarse voice, neck pain, shortness of breath, sneezing or swollen glands. Treatments tried: zyrtec d, tiffanie seltzer mucus and congestion, ibuprofen, bromphen-pse dm, vitamins. The treatment provided no relief.     Constitution: Positive for fever. Negative for activity change, appetite change, chills, sweating and fatigue.   HENT:  Positive for ear pain, congestion, postnasal drip, sinus pain, sinus pressure and sore throat. Negative for nosebleeds, foreign body in nose, trouble swallowing and voice change.    Neck: Negative for neck pain and neck stiffness.   Cardiovascular:  Negative for chest pain, leg swelling, palpitations and sob on exertion.   Respiratory:  Positive for cough. Negative for sputum production, bloody sputum, COPD, shortness of breath, stridor, wheezing and asthma.    Gastrointestinal:  Negative for abdominal pain.   Skin:  Negative for rash.   Allergic/Immunologic: Negative for asthma and sneezing.   Neurological:  Negative for headaches.    Objective:     Physical Exam   Constitutional: She is oriented to person, place, and time. She appears well-developed. She is cooperative.  Non-toxic appearance. She does not appear ill. No distress.   HENT:   Head: Normocephalic and atraumatic.   Ears:   Right Ear: Hearing, tympanic membrane, external ear and ear canal normal.   Left Ear: Hearing, tympanic membrane, external ear and ear canal normal.   Nose: Nose normal. No mucosal edema, rhinorrhea or nasal deformity. No epistaxis. Right sinus exhibits no maxillary sinus tenderness and no frontal sinus tenderness. Left sinus exhibits no maxillary sinus tenderness and no frontal sinus tenderness.   Mouth/Throat:  Uvula is midline, oropharynx is clear and moist and mucous membranes are normal. No trismus in the jaw. Normal dentition. No uvula swelling. Cobblestoning present. No oropharyngeal exudate, posterior oropharyngeal edema or posterior oropharyngeal erythema.   Eyes: Conjunctivae and lids are normal. No scleral icterus.   Neck: Trachea normal and phonation normal. Neck supple. No edema present. No erythema present. No neck rigidity present.   Cardiovascular: Normal rate, regular rhythm, normal heart sounds and normal pulses.   Pulmonary/Chest: Effort normal and breath sounds normal. No respiratory distress. She has no decreased breath sounds. She has no rhonchi.   Abdominal: Normal appearance.   Musculoskeletal: Normal range of motion.         General: No deformity. Normal range of motion.   Neurological: She is alert and oriented to person, place, and time. She exhibits normal muscle tone. Coordination normal.   Skin: Skin is warm, dry, intact, not diaphoretic and not pale.   Psychiatric: Her speech is normal and behavior is normal. Judgment and thought content normal.   Nursing note and vitals reviewed.    Assessment:     1. Bacterial sinusitis    2. Sore throat      Results for orders placed or performed in visit on 05/13/23   POCT Strep A, Molecular   Result Value Ref Range    Molecular Strep A, POC Negative Negative     Acceptable Yes       Plan:     Advised on Astelin and Flonase, increase antihistamines, Sudafed for congestion.  Delsym for the cough and Mucinex for the cough.  Advised on watch and wait with Augmentin.  Nad, lungs ctab, vss    Discussed results/diagnosis/plan with patient in clinic. Strict precautions given to patient to monitor for worsening signs and symptoms. Advised to follow up with PCP or specialist.    Explained side effects of medications prescribed with patient and informed him/her to discontinue use if he/she has any side effects and to inform UC or PCP if this occurs.  All questions answered. Strict ED verses clinic return precautions stressed and given in depth. Advised if symptoms worsens of fail to improve he/she should go to the Emergency Room. Discharge and follow-up instructions given verbally/printed with the patient who expressed understanding and willingness to comply with my recommendations. Patient voiced understanding and in agreement with current treatment plan. Patient exits the exam room in no acute distress. Conversant and engaged during discharge discussion, verbalized understanding.      Bacterial sinusitis  Comments:  watch and wait for augmentin  Orders:  -     amoxicillin-clavulanate 875-125mg (AUGMENTIN) 875-125 mg per tablet; Take 1 tablet by mouth every 12 (twelve) hours. for 7 days  Dispense: 14 tablet; Refill: 0  -     azelastine (ASTELIN) 137 mcg (0.1 %) nasal spray; 1 spray (137 mcg total) by Nasal route 2 (two) times daily.  Dispense: 30 mL; Refill: 0  -     fluticasone propionate (FLONASE) 50 mcg/actuation nasal spray; 1 spray (50 mcg total) by Each Nostril route once daily.  Dispense: 16 g; Refill: 0  -     benzonatate (TESSALON) 200 MG capsule; Take 1 capsule (200 mg total) by mouth 3 (three) times daily as needed for Cough.  Dispense: 30 capsule; Refill: 0    Sore throat  -     POCT Strep A, Molecular          Medical Decision Making:   History:   Old Medical Records: I decided to obtain old medical records.  Old Records Summarized: records from clinic visits.  Clinical Tests:   Lab Tests: Ordered and Reviewed       <> Summary of Lab: Strep neg  Additional MDM:     Heart Failure Score:   COPD = No    Patient Instructions   General Discharge Instructions   PLEASE READ YOUR DISCHARGE INSTRUCTIONS ENTIRELY AS IT CONTAINS IMPORTANT INFORMATION.  If you were prescribed a narcotic or controlled medication, do not drive or operate heavy equipment or machinery while taking these medications.  If you were prescribed antibiotics, please take them to  completion.  You must understand that you've received an Urgent Care treatment only and that you may be released before all your medical problems are known or treated. You, the patient, will arrange for follow up care as instructed.    OVER THE COUNTER RECOMMENDATIONS/SUGGESTIONS.    Make sure to stay well hydrated.    Use Nasal Saline to mechanically move any post nasal drip from your eustachian tube or from the back of your throat.    Use warm salt water gargles to ease your throat pain. Warm salt water gargles as needed for sore throat- 1/2 tsp salt to 1 cup warm water, gargle as desired.    Use an antihistamine such as Claritin, Zyrtec or Allegra to dry you out.    Use pseudoephedrine (behind the counter) to decongest. Pseudoephedrine 30 mg up to 240 mg /day. It can raise your blood pressure and give you palpitations.    Use mucinex (guaifenesin) to break up mucous up to 2400mg/day to loosen any mucous.    The mucinex DM pill has a cough suppressant that can be sedating. It can be used at night to stop the tickle at the back of your throat.    You can use Mucinex D (it has guaifenesin and a high dose of pseudoephedrine) in the mornings to help decongest.    Use Afrin in each nare for no longer than 3 days, as it is addictive. It can also dry out your mucous membranes and cause elevated blood pressure. This is especially useful if you are flying.    Use Flonase 1-2 sprays/nostril per day. It is a local acting steroid nasal spray, if you develop a bloody nose, stop using the medication immediately.    Sometimes Nyquil at night is beneficial to help you get some rest, however it is sedating and it does have an antihistamine, and tylenol.    Honey is a natural cough suppressant that can be used.    Tylenol up to 4,000 mg a day is safe for short periods and can be used for body aches, pain, and fever. However in high doses and prolonged use it can cause liver irritation.    Ibuprofen is a non-steroidal  anti-inflammatory that can be used for body aches, pain, and fever.However it can also cause stomach irritation if over used.     Follow up with your PCP or specialty clinic as instructed in the next 2-3 days if not improved or as needed. You can call [phone] to schedule an appointment with appropriate provider.      If you condition worsens, we recommend that you receive another evaluation at the emergency room immediately or contact your primary medical clinic's after hours call service to discuss your concerns.      Please return here or go to the Emergency Department for any concerns or worsening condition.   You can also call [phone] to schedule an appointment with the appropriate provider.    Please return here or go to the Emergency Department for any concerns or worsening of condition.    Thank you for choosing [facility]!    Our goal in the Urgent Care is to always provide outstanding medical care. You may receive a survey by mail or e-mail in the next week regarding your experience today. We would greatly appreciate you completing and returning the survey. Your feedback provides us with a way to recognize our staff who provide very good care, and it helps us learn how to improve when your experience was below our aspiration of excellence.      We appreciate you trusting us with your medical care. We hope you feel better soon. We will be happy to take care of you for all of your future medical needs.    Sincerely,    JONATHAN Chery  Follow up with your Primary Care Provider in 2-3 days if no improvement.  If you do not have one, please see the list provided and become established with one.  If your condition worsens we recommend that you receive another evaluation at the emergency room immediately or contact your primary medical clinics after hours call service to discuss your concerns.  Flonase nasal spray as directed. Breathe right strips at night to help you breathe.  A cool  mist humidifier in bedroom may help with cough and relieve stuffy nose.  Sore throat:  Lozenge, hard candy or honey.  Sinus rinses DO NOT USE TAP WATER, if you must, water must be a rolling boil for 1 minute, let it cool, then use.  May use distilled water.  Vics vapor rub in shower to help open nasal passages.  May use nasal gel to keep passages moisturized.  May use Nasal saline sprays during the day for added relief of congestion.   For those who go to the gym, please do not use the sauna or steam room now to clear sinuses.  During pollen season, change shirt if you are outside for a while when you go in.  Also wash your face.  Do not touch your face with your hands.  Wash your hands often in general while ill, avoid face contact with hands. Good nutrition. Lots of rest You may or may not have received a steroid injection, if you have, you may experience some jitters or develop nervousness.  You may also not rest well this night- these symptoms will resolve.  If you were give a prescription for steroids, do not medications such as Motrin, Advil, Ibuprofen, Aleve, Mobic, or Toradol while taking the steroid. these are non-steroidal anti-inflammatory medications which you do not need while taking steroids.  If you were given an antibiotic to take at home, please take it until it is completed even if you begin feeling better prior to the course of therapy being completed.  To attempt to minimize abdominal discomfort while taking antibiotics, you may try to take probiotics.  SEPARATE the antibiotics and probiotics by TWO hours, if not neither medication will work.  If you received a steroid shot today - this can elevate your blood pressure, elevate your blood sugar, water weight gain, nervous energy, redness to the face and dimpling of the skin where the shot goes in.     You must understand that you've received an Urgent Care treatment only and that you may be released before all your medical problems are known or  treated. You, the patient, will arrange for follow up care as instructed

## 2023-05-13 NOTE — PATIENT INSTRUCTIONS
General Discharge Instructions   PLEASE READ YOUR DISCHARGE INSTRUCTIONS ENTIRELY AS IT CONTAINS IMPORTANT INFORMATION.  If you were prescribed a narcotic or controlled medication, do not drive or operate heavy equipment or machinery while taking these medications.  If you were prescribed antibiotics, please take them to completion.  You must understand that you've received an Urgent Care treatment only and that you may be released before all your medical problems are known or treated. You, the patient, will arrange for follow up care as instructed.    OVER THE COUNTER RECOMMENDATIONS/SUGGESTIONS.    Make sure to stay well hydrated.    Use Nasal Saline to mechanically move any post nasal drip from your eustachian tube or from the back of your throat.    Use warm salt water gargles to ease your throat pain. Warm salt water gargles as needed for sore throat- 1/2 tsp salt to 1 cup warm water, gargle as desired.    Use an antihistamine such as Claritin, Zyrtec or Allegra to dry you out.    Use pseudoephedrine (behind the counter) to decongest. Pseudoephedrine 30 mg up to 240 mg /day. It can raise your blood pressure and give you palpitations.    Use mucinex (guaifenesin) to break up mucous up to 2400mg/day to loosen any mucous.    The mucinex DM pill has a cough suppressant that can be sedating. It can be used at night to stop the tickle at the back of your throat.    You can use Mucinex D (it has guaifenesin and a high dose of pseudoephedrine) in the mornings to help decongest.    Use Afrin in each nare for no longer than 3 days, as it is addictive. It can also dry out your mucous membranes and cause elevated blood pressure. This is especially useful if you are flying.    Use Flonase 1-2 sprays/nostril per day. It is a local acting steroid nasal spray, if you develop a bloody nose, stop using the medication immediately.    Sometimes Nyquil at night is beneficial to help you get some rest, however it is sedating and it  does have an antihistamine, and tylenol.    Honey is a natural cough suppressant that can be used.    Tylenol up to 4,000 mg a day is safe for short periods and can be used for body aches, pain, and fever. However in high doses and prolonged use it can cause liver irritation.    Ibuprofen is a non-steroidal anti-inflammatory that can be used for body aches, pain, and fever.However it can also cause stomach irritation if over used.     Follow up with your PCP or specialty clinic as instructed in the next 2-3 days if not improved or as needed. You can call [phone] to schedule an appointment with appropriate provider.      If you condition worsens, we recommend that you receive another evaluation at the emergency room immediately or contact your primary medical clinic's after hours call service to discuss your concerns.      Please return here or go to the Emergency Department for any concerns or worsening condition.   You can also call [phone] to schedule an appointment with the appropriate provider.    Please return here or go to the Emergency Department for any concerns or worsening of condition.    Thank you for choosing [facility]!    Our goal in the Urgent Care is to always provide outstanding medical care. You may receive a survey by mail or e-mail in the next week regarding your experience today. We would greatly appreciate you completing and returning the survey. Your feedback provides us with a way to recognize our staff who provide very good care, and it helps us learn how to improve when your experience was below our aspiration of excellence.      We appreciate you trusting us with your medical care. We hope you feel better soon. We will be happy to take care of you for all of your future medical needs.    Sincerely,    JONATHAN Chery  Follow up with your Primary Care Provider in 2-3 days if no improvement.  If you do not have one, please see the list provided and become  established with one.  If your condition worsens we recommend that you receive another evaluation at the emergency room immediately or contact your primary medical clinics after hours call service to discuss your concerns.  Flonase nasal spray as directed. Breathe right strips at night to help you breathe.  A cool mist humidifier in bedroom may help with cough and relieve stuffy nose.  Sore throat:  Lozenge, hard candy or honey.  Sinus rinses DO NOT USE TAP WATER, if you must, water must be a rolling boil for 1 minute, let it cool, then use.  May use distilled water.  Vics vapor rub in shower to help open nasal passages.  May use nasal gel to keep passages moisturized.  May use Nasal saline sprays during the day for added relief of congestion.   For those who go to the gym, please do not use the sauna or steam room now to clear sinuses.  During pollen season, change shirt if you are outside for a while when you go in.  Also wash your face.  Do not touch your face with your hands.  Wash your hands often in general while ill, avoid face contact with hands. Good nutrition. Lots of rest You may or may not have received a steroid injection, if you have, you may experience some jitters or develop nervousness.  You may also not rest well this night- these symptoms will resolve.  If you were give a prescription for steroids, do not medications such as Motrin, Advil, Ibuprofen, Aleve, Mobic, or Toradol while taking the steroid. these are non-steroidal anti-inflammatory medications which you do not need while taking steroids.  If you were given an antibiotic to take at home, please take it until it is completed even if you begin feeling better prior to the course of therapy being completed.  To attempt to minimize abdominal discomfort while taking antibiotics, you may try to take probiotics.  SEPARATE the antibiotics and probiotics by TWO hours, if not neither medication will work.  If you received a steroid shot today - this  can elevate your blood pressure, elevate your blood sugar, water weight gain, nervous energy, redness to the face and dimpling of the skin where the shot goes in.     You must understand that you've received an Urgent Care treatment only and that you may be released before all your medical problems are known or treated. You, the patient, will arrange for follow up care as instructed

## 2023-05-16 ENCOUNTER — TELEPHONE (OUTPATIENT)
Dept: URGENT CARE | Facility: CLINIC | Age: 43
End: 2023-05-16
Payer: COMMERCIAL

## 2023-05-16 NOTE — TELEPHONE ENCOUNTER
Followed up with patient regarding her urgent care visit.  Patient stated she is feeling a little better.

## 2023-05-18 DIAGNOSIS — J01.80 ACUTE NON-RECURRENT SINUSITIS OF OTHER SINUS: Primary | ICD-10-CM

## 2023-05-18 RX ORDER — METHYLPREDNISOLONE 4 MG/1
TABLET ORAL
Qty: 21 EACH | Refills: 0 | Status: SHIPPED | OUTPATIENT
Start: 2023-05-18 | End: 2023-07-11

## 2023-05-25 ENCOUNTER — TELEPHONE (OUTPATIENT)
Dept: SLEEP MEDICINE | Facility: OTHER | Age: 43
End: 2023-05-25
Payer: COMMERCIAL

## 2023-06-14 DIAGNOSIS — B37.0 THRUSH: Primary | ICD-10-CM

## 2023-06-14 RX ORDER — NYSTATIN 100000 [USP'U]/ML
6 SUSPENSION ORAL 4 TIMES DAILY
Qty: 240 ML | Refills: 0 | Status: SHIPPED | OUTPATIENT
Start: 2023-06-14 | End: 2023-08-06

## 2023-07-11 ENCOUNTER — OFFICE VISIT (OUTPATIENT)
Dept: FAMILY MEDICINE | Facility: CLINIC | Age: 43
End: 2023-07-11
Payer: COMMERCIAL

## 2023-07-11 ENCOUNTER — PATIENT MESSAGE (OUTPATIENT)
Dept: FAMILY MEDICINE | Facility: CLINIC | Age: 43
End: 2023-07-11

## 2023-07-11 ENCOUNTER — HOSPITAL ENCOUNTER (OUTPATIENT)
Dept: RADIOLOGY | Facility: HOSPITAL | Age: 43
Discharge: HOME | End: 2023-07-11
Attending: NURSE PRACTITIONER
Payer: COMMERCIAL

## 2023-07-11 VITALS
TEMPERATURE: 98 F | HEIGHT: 64 IN | HEART RATE: 89 BPM | DIASTOLIC BLOOD PRESSURE: 64 MMHG | OXYGEN SATURATION: 100 % | BODY MASS INDEX: 32.8 KG/M2 | SYSTOLIC BLOOD PRESSURE: 118 MMHG | WEIGHT: 192.13 LBS

## 2023-07-11 DIAGNOSIS — R92.8 ABNORMAL MAMMOGRAM: ICD-10-CM

## 2023-07-11 DIAGNOSIS — F90.0 ADHD (ATTENTION DEFICIT HYPERACTIVITY DISORDER), INATTENTIVE TYPE: Primary | ICD-10-CM

## 2023-07-11 DIAGNOSIS — S22.080D COMPRESSION FRACTURE OF T12 VERTEBRA WITH ROUTINE HEALING, SUBSEQUENT ENCOUNTER: ICD-10-CM

## 2023-07-11 DIAGNOSIS — I73.00 RAYNAUD'S DISEASE WITHOUT GANGRENE: ICD-10-CM

## 2023-07-11 PROCEDURE — 3078F DIAST BP <80 MM HG: CPT | Mod: CPTII,S$GLB,, | Performed by: PEDIATRICS

## 2023-07-11 PROCEDURE — 1159F PR MEDICATION LIST DOCUMENTED IN MEDICAL RECORD: ICD-10-PCS | Mod: CPTII,S$GLB,, | Performed by: PEDIATRICS

## 2023-07-11 PROCEDURE — 1160F PR REVIEW ALL MEDS BY PRESCRIBER/CLIN PHARMACIST DOCUMENTED: ICD-10-PCS | Mod: CPTII,S$GLB,, | Performed by: PEDIATRICS

## 2023-07-11 PROCEDURE — 99999 PR PBB SHADOW E&M-EST. PATIENT-LVL V: CPT | Mod: PBBFAC,,, | Performed by: PEDIATRICS

## 2023-07-11 PROCEDURE — 3008F PR BODY MASS INDEX (BMI) DOCUMENTED: ICD-10-PCS | Mod: CPTII,S$GLB,, | Performed by: PEDIATRICS

## 2023-07-11 PROCEDURE — 3074F SYST BP LT 130 MM HG: CPT | Mod: CPTII,S$GLB,, | Performed by: PEDIATRICS

## 2023-07-11 PROCEDURE — 3008F BODY MASS INDEX DOCD: CPT | Mod: CPTII,S$GLB,, | Performed by: PEDIATRICS

## 2023-07-11 PROCEDURE — 1160F RVW MEDS BY RX/DR IN RCRD: CPT | Mod: CPTII,S$GLB,, | Performed by: PEDIATRICS

## 2023-07-11 PROCEDURE — 77080 DEXA BONE DENSITY SPINE HIP: ICD-10-PCS | Mod: 26,,, | Performed by: RADIOLOGY

## 2023-07-11 PROCEDURE — 1159F MED LIST DOCD IN RCRD: CPT | Mod: CPTII,S$GLB,, | Performed by: PEDIATRICS

## 2023-07-11 PROCEDURE — 3044F HG A1C LEVEL LT 7.0%: CPT | Mod: CPTII,S$GLB,, | Performed by: PEDIATRICS

## 2023-07-11 PROCEDURE — 77080 DXA BONE DENSITY AXIAL: CPT | Mod: 26,,, | Performed by: RADIOLOGY

## 2023-07-11 PROCEDURE — 3078F PR MOST RECENT DIASTOLIC BLOOD PRESSURE < 80 MM HG: ICD-10-PCS | Mod: CPTII,S$GLB,, | Performed by: PEDIATRICS

## 2023-07-11 PROCEDURE — 99999 PR PBB SHADOW E&M-EST. PATIENT-LVL V: ICD-10-PCS | Mod: PBBFAC,,, | Performed by: PEDIATRICS

## 2023-07-11 PROCEDURE — 3074F PR MOST RECENT SYSTOLIC BLOOD PRESSURE < 130 MM HG: ICD-10-PCS | Mod: CPTII,S$GLB,, | Performed by: PEDIATRICS

## 2023-07-11 PROCEDURE — 77080 DXA BONE DENSITY AXIAL: CPT | Mod: TC

## 2023-07-11 PROCEDURE — 99214 OFFICE O/P EST MOD 30 MIN: CPT | Mod: S$GLB,,, | Performed by: PEDIATRICS

## 2023-07-11 PROCEDURE — 3044F PR MOST RECENT HEMOGLOBIN A1C LEVEL <7.0%: ICD-10-PCS | Mod: CPTII,S$GLB,, | Performed by: PEDIATRICS

## 2023-07-11 PROCEDURE — 99214 PR OFFICE/OUTPT VISIT, EST, LEVL IV, 30-39 MIN: ICD-10-PCS | Mod: S$GLB,,, | Performed by: PEDIATRICS

## 2023-07-11 RX ORDER — DEXTROAMPHETAMINE SACCHARATE, AMPHETAMINE ASPARTATE, DEXTROAMPHETAMINE SULFATE AND AMPHETAMINE SULFATE 5; 5; 5; 5 MG/1; MG/1; MG/1; MG/1
TABLET ORAL
Qty: 75 TABLET | Refills: 0 | Status: SHIPPED | OUTPATIENT
Start: 2023-07-11

## 2023-07-11 NOTE — PROGRESS NOTES
Advise patient that bone density showed osteopenia present.  Take a calcium with vitamin D supplement for bone health.

## 2023-07-11 NOTE — PROGRESS NOTES
Subjective:      Patient ID: Judie Schmitz is a 43 y.o. female.    Chief Complaint: Medication Refill    Needs refill of Adderall since previous psych left [facility]. Saw him last in April, and was given 75 tabs for 3 months. She was previously taking klonapin, receiving 30 tablets every 3 months. Wants to get off of klonapin and use norco as recommended by pain management. Has not seen rheumatology in several years and would like to reestablish. Also needs new order for diagnostic mammogram and us following abnormal mammogram earlier this year. Has no complaints today.    Medication Refill  Pertinent negatives include no arthralgias, chest pain, chills, congestion, coughing, fatigue, fever, headaches, nausea, rash, sore throat or vomiting.   Review of Systems   Constitutional:  Negative for chills, fatigue and fever.   HENT:  Negative for congestion, ear pain, postnasal drip, rhinorrhea, sinus pressure, sinus pain, sneezing and sore throat.    Respiratory:  Negative for cough, chest tightness, shortness of breath and wheezing.    Cardiovascular:  Negative for chest pain and palpitations.   Gastrointestinal:  Negative for diarrhea, nausea and vomiting.   Genitourinary:  Negative for difficulty urinating.   Musculoskeletal:  Negative for arthralgias.   Skin:  Negative for rash.   Neurological:  Negative for dizziness and headaches.   Psychiatric/Behavioral:  Negative for confusion.       Current Outpatient Medications on File Prior to Visit   Medication Sig    amitriptyline (ELAVIL) 50 MG tablet Take 1 tablet (50 mg total) by mouth every evening.    ascorbic acid, vitamin C, (VITAMIN C) 1000 MG tablet Take 1,000 mg by mouth once daily.    azelastine (ASTELIN) 137 mcg (0.1 %) nasal spray 1 spray (137 mcg total) by Nasal route 2 (two) times daily.    dicyclomine (BENTYL) 10 MG capsule Take 1 capsule (10 mg total) by mouth 4 (four) times daily as needed.    etonogestreL-ethinyl estradioL (NUVARING) 0.12-0.015  mg/24 hr vaginal ring Place 1 each vaginally every 21 days. Insert one (1) ring vaginally and leave in place for three (3) weeks, then remove for one (1) week.    fluticasone propionate (FLONASE) 50 mcg/actuation nasal spray 1 spray (50 mcg total) by Each Nostril route once daily.    ibuprofen (ADVIL,MOTRIN) 800 MG tablet Take 1 tablet by mouth every 8 (eight) hours as needed for pain.    meloxicam (MOBIC) 15 MG tablet Take 1 tablet (15 mg total) by mouth once daily.    mv-min/iron/folic/calcium/vitK (WOMEN'S MULTIVITAMIN ORAL) Take by mouth.    pantoprazole (PROTONIX) 40 MG tablet Take 1 tablet (40 mg total) by mouth once daily.    tiZANidine (ZANAFLEX) 4 MG tablet Take 1 tablet by mouth every 8 (eight) hours as needed (muscle spasms)    zolpidem (AMBIEN) 10 mg Tab TAKE ONE TABLET BY MOUTH NIGHTLY BEFORE BEDTIME AS NEEDED FOR INSOMNIA    clonazePAM (KLONOPIN) 0.5 MG disintegrating tablet Dissolve 1 tablet (0.5 mg total) by mouth nightly as needed (anxiety).    [DISCONTINUED] dextroamphetamine-amphetamine (ADDERALL) 20 mg tablet One and a half tablets by mouth in the morning and one tablet by mouth in the afternoon    [DISCONTINUED] dextroamphetamine-amphetamine (ADDERALL) 20 mg tablet One and a half tablets by mouth in the morning and one tablet by mouth in the afternoon    [DISCONTINUED] dextroamphetamine-amphetamine (ADDERALL) 20 mg tablet One and a half tablets by mouth in the morning and one tablet by mouth in the afternoon    [DISCONTINUED] dextroamphetamine-amphetamine (ADDERALL) 20 mg tablet One and a half tablets by mouth in the morning and one tablet by mouth in the afternoon    [DISCONTINUED] dextroamphetamine-amphetamine (ADDERALL) 20 mg tablet One and a half tablets by mouth in the morning and one tablet by mouth in the afternoon    [DISCONTINUED] dextroamphetamine-amphetamine (ADDERALL) 20 mg tablet One and a half tablets by mouth in the morning and one tablet by mouth in the afternoon     [DISCONTINUED] dextroamphetamine-amphetamine (ADDERALL) 20 mg tablet One and a half tablets by mouth in the morning and one tablet by mouth in the afternoon    [DISCONTINUED] methylPREDNISolone (MEDROL DOSEPACK) 4 mg tablet Follow package directions.    [DISCONTINUED] sertraline (ZOLOFT) 25 MG tablet Take 0.5 tablets (12.5 mg total) by mouth once daily. (Patient not taking: Reported on 1/30/2023)     No current facility-administered medications on file prior to visit.        Objective:     Vitals:    07/11/23 0850   BP: 118/64   Pulse: 89   Temp: 98.1 °F (36.7 °C)      Physical Exam  Constitutional:       General: She is not in acute distress.     Appearance: Normal appearance.   HENT:      Head: Normocephalic and atraumatic.      Right Ear: External ear normal.      Left Ear: External ear normal.      Nose: Nose normal.      Mouth/Throat:      Mouth: Mucous membranes are moist.      Pharynx: Oropharynx is clear.   Eyes:      Extraocular Movements: Extraocular movements intact.      Conjunctiva/sclera: Conjunctivae normal.      Pupils: Pupils are equal, round, and reactive to light.   Cardiovascular:      Rate and Rhythm: Normal rate and regular rhythm.      Pulses: Normal pulses.      Heart sounds: Normal heart sounds.   Pulmonary:      Effort: Pulmonary effort is normal. No respiratory distress.      Breath sounds: Normal breath sounds. No wheezing.   Abdominal:      General: Abdomen is flat.   Musculoskeletal:         General: No swelling. Normal range of motion.      Cervical back: Normal range of motion and neck supple.   Skin:     General: Skin is warm and dry.      Findings: No rash.   Neurological:      Mental Status: She is alert and oriented to person, place, and time.      Gait: Gait normal.   Psychiatric:         Mood and Affect: Mood normal.         Behavior: Behavior normal.      Assessment:         1. ADHD (attention deficit hyperactivity disorder), inattentive type    2. Abnormal mammogram    3.  Raynaud's disease without gangrene          Plan:   1. ADHD (attention deficit hyperactivity disorder), inattentive type  - dextroamphetamine-amphetamine (ADDERALL) 20 mg tablet; One and a half tablets by mouth in the morning and one tablet by mouth in the afternoon  Dispense: 75 tablet; Refill: 0  Discussed this will be the only refill from PCP for this medication, must come from psych.  Has scheduled psych visit while in the office today, will establish on August 4.    2. Abnormal mammogram  - Mammo Digital Diagnostic Right; Future  - US Breast Right Complete; Future    3. Raynaud's disease without gangrene  - Ambulatory referral/consult to Rheumatology; Future         Has establish care appointment w/ Dr. Alberts in September w/ labs before.  Follow up if symptoms worsen or fail to improve.       Michele Roth, PARVIZ   [facility]  7/11/23

## 2023-08-15 RX ORDER — ETONOGESTREL AND ETHINYL ESTRADIOL VAGINAL RING .015; .12 MG/D; MG/D
1 RING VAGINAL
Qty: 1 EACH | Refills: 6 | Status: SHIPPED | OUTPATIENT
Start: 2023-08-15 | End: 2024-08-14

## 2023-08-15 NOTE — TELEPHONE ENCOUNTER
Judie desire refill of Nuvaring, pt last wwe was 2/2023  Patient Active Problem List   Diagnosis    Irritable bowel syndrome with diarrhea    ADHD (attention deficit hyperactivity disorder), inattentive type    Insomnia    Overweight (BMI 25.0-29.9)    Excessive sweating    Chronic midline low back pain without sciatica    Raynaud's disease    Fecal urgency    Family history of colonic polyps    IVANA (generalized anxiety disorder)    Temporary high blood pressure    Class 1 obesity due to excess calories without serious comorbidity with body mass index (BMI) of 32.0 to 32.9 in adult    Compression fracture of T12 vertebra     Prior to Admission medications    Medication Sig Start Date End Date Taking? Authorizing Provider   amitriptyline (ELAVIL) 50 MG tablet Take 1 tablet (50 mg total) by mouth every evening. 3/31/23 3/30/24  Savannah Ferrari FNP   ascorbic acid, vitamin C, (VITAMIN C) 1000 MG tablet Take 1,000 mg by mouth once daily.    Provider, Historical   azelastine (ASTELIN) 137 mcg (0.1 %) nasal spray 1 spray (137 mcg total) by Nasal route 2 (two) times daily. 5/13/23 5/12/24  Marilou Buchanan, NP   clonazePAM (KLONOPIN) 0.5 MG disintegrating tablet Dissolve 1 tablet (0.5 mg total) by mouth nightly as needed (anxiety). 4/5/23 5/13/23  Eliecer Trinidad, NP   dextroamphetamine-amphetamine (ADDERALL) 20 mg tablet One and a half tablets by mouth in the morning and one tablet by mouth in the afternoon 7/11/23   Michele Roth, NP   dicyclomine (BENTYL) 10 MG capsule Take 1 capsule (10 mg total) by mouth 4 (four) times daily as needed. 3/31/23   Savannah Ferrari FNP   etonogestreL-ethinyl estradioL (NUVARING) 0.12-0.015 mg/24 hr vaginal ring Place 1 each vaginally every 21 days. Insert one (1) ring vaginally and leave in place for three (3) weeks, then remove for one (1) week. 2/24/23 2/24/24  Jigar Denny MD   fluticasone propionate (FLONASE) 50 mcg/actuation nasal spray 1 spray (50 mcg  total) by Each Nostril route once daily. 5/13/23   Marilou Buchanan NP   HYDROcodone-acetaminophen (NORCO)  mg per tablet Take 1 tablet twice a day  as needed 7/19/23      ibuprofen (ADVIL,MOTRIN) 800 MG tablet Take 1 tablet by mouth every 8 (eight) hours as needed for pain. 4/13/23      meloxicam (MOBIC) 15 MG tablet Take 1 tablet (15 mg total) by mouth once daily. 11/4/22   Joaquina Barbosa DPM   mv-min/iron/folic/calcium/vitK (WOMEN'S MULTIVITAMIN ORAL) Take by mouth.    Provider, Historical   pantoprazole (PROTONIX) 40 MG tablet Take 1 tablet (40 mg total) by mouth once daily. 2/20/23 2/20/24  Cheryl Hood MD   tiZANidine (ZANAFLEX) 4 MG tablet Take 1 tablet by mouth every 8 (eight) hours as needed (muscle spasms) 4/13/23      zolpidem (AMBIEN) 10 mg Tab TAKE ONE TABLET BY MOUTH NIGHTLY BEFORE BEDTIME AS NEEDED FOR INSOMNIA 4/6/23   Shanda Garcia MD

## 2023-08-18 NOTE — TELEPHONE ENCOUNTER
Called [facility] in [place] and spoke with Gale in regards of pt's Adderall 20 mg medication. Informed her that I was calling to cancel the rx, since pt wants medication filled else where. Gale informed me that they never did receive a med rx on the pt.   Patient was able to provide a urine sample. Sent to lab @ 0461.

## 2023-08-24 DIAGNOSIS — G47.00 INSOMNIA, UNSPECIFIED TYPE: ICD-10-CM

## 2023-08-24 RX ORDER — ZOLPIDEM TARTRATE 10 MG/1
TABLET ORAL
Qty: 30 TABLET | Refills: 5 | Status: SHIPPED | OUTPATIENT
Start: 2023-08-24 | End: 2023-08-25 | Stop reason: SDUPTHER

## 2023-08-25 DIAGNOSIS — G47.00 INSOMNIA, UNSPECIFIED TYPE: ICD-10-CM

## 2023-08-25 RX ORDER — ZOLPIDEM TARTRATE 10 MG/1
TABLET ORAL
Qty: 30 TABLET | Refills: 5 | Status: SHIPPED | OUTPATIENT
Start: 2023-08-25 | End: 2024-02-26 | Stop reason: SDUPTHER

## 2023-10-18 DIAGNOSIS — F90.0 ADHD (ATTENTION DEFICIT HYPERACTIVITY DISORDER), INATTENTIVE TYPE: ICD-10-CM

## 2023-10-19 RX ORDER — DEXTROAMPHETAMINE SACCHARATE, AMPHETAMINE ASPARTATE, DEXTROAMPHETAMINE SULFATE AND AMPHETAMINE SULFATE 5; 5; 5; 5 MG/1; MG/1; MG/1; MG/1
TABLET ORAL
Qty: 75 TABLET | Refills: 0 | OUTPATIENT
Start: 2023-10-19

## 2023-10-19 NOTE — TELEPHONE ENCOUNTER
I call patient in regards of medication, patient states she is seeing the same Psychiatrist she had before.

## 2024-02-21 RX ORDER — PANTOPRAZOLE SODIUM 40 MG/1
40 TABLET, DELAYED RELEASE ORAL DAILY
Qty: 90 TABLET | Refills: 3 | Status: SHIPPED | OUTPATIENT
Start: 2024-02-21 | End: 2024-04-25

## 2024-02-26 DIAGNOSIS — G47.00 INSOMNIA, UNSPECIFIED TYPE: ICD-10-CM

## 2024-02-26 RX ORDER — ZOLPIDEM TARTRATE 10 MG/1
TABLET ORAL
Qty: 30 TABLET | Refills: 5 | Status: SHIPPED | OUTPATIENT
Start: 2024-02-26

## 2024-03-31 DIAGNOSIS — K58.0 IRRITABLE BOWEL SYNDROME WITH DIARRHEA: ICD-10-CM

## 2024-04-01 RX ORDER — AMITRIPTYLINE HYDROCHLORIDE 50 MG/1
50 TABLET, FILM COATED ORAL NIGHTLY
Qty: 90 TABLET | Refills: 3 | Status: SHIPPED | OUTPATIENT
Start: 2024-04-01 | End: 2025-04-01

## 2024-04-15 ENCOUNTER — TELEPHONE (OUTPATIENT)
Dept: FAMILY MEDICINE | Facility: CLINIC | Age: 44
End: 2024-04-15
Payer: MEDICAID

## 2024-04-15 DIAGNOSIS — Z00.00 ENCOUNTER FOR BLOOD TEST FOR ROUTINE GENERAL PHYSICAL EXAMINATION: Primary | ICD-10-CM

## 2024-04-15 DIAGNOSIS — Z13.29 THYROID DISORDER SCREEN: ICD-10-CM

## 2024-04-15 DIAGNOSIS — Z13.220 SCREENING CHOLESTEROL LEVEL: ICD-10-CM

## 2024-04-15 DIAGNOSIS — Z13.1 DIABETES MELLITUS SCREENING: ICD-10-CM

## 2024-04-15 DIAGNOSIS — Z13.0 SCREENING FOR DEFICIENCY ANEMIA: ICD-10-CM

## 2024-04-15 NOTE — TELEPHONE ENCOUNTER
Patient is schedule for tomorrow for Wellness- no labs are done- I reschedule her appt to 04/25- can you order labs and linked it to the lab appt I schedule.

## 2024-04-25 ENCOUNTER — OFFICE VISIT (OUTPATIENT)
Dept: FAMILY MEDICINE | Facility: CLINIC | Age: 44
End: 2024-04-25
Payer: MEDICAID

## 2024-04-25 VITALS
HEART RATE: 103 BPM | TEMPERATURE: 98 F | SYSTOLIC BLOOD PRESSURE: 130 MMHG | DIASTOLIC BLOOD PRESSURE: 86 MMHG | OXYGEN SATURATION: 97 % | BODY MASS INDEX: 26.14 KG/M2 | WEIGHT: 153.13 LBS | HEIGHT: 64 IN

## 2024-04-25 DIAGNOSIS — Z86.39 HISTORY OF OBESITY: ICD-10-CM

## 2024-04-25 DIAGNOSIS — F90.0 ADHD (ATTENTION DEFICIT HYPERACTIVITY DISORDER), INATTENTIVE TYPE: ICD-10-CM

## 2024-04-25 DIAGNOSIS — Z87.81 HISTORY OF COMPRESSION FRACTURE OF SPINE: ICD-10-CM

## 2024-04-25 DIAGNOSIS — R92.8 ABNORMAL MAMMOGRAM: ICD-10-CM

## 2024-04-25 DIAGNOSIS — M85.80 OSTEOPENIA, UNSPECIFIED LOCATION: ICD-10-CM

## 2024-04-25 DIAGNOSIS — Z12.31 ENCOUNTER FOR SCREENING MAMMOGRAM FOR MALIGNANT NEOPLASM OF BREAST: ICD-10-CM

## 2024-04-25 DIAGNOSIS — F41.1 GAD (GENERALIZED ANXIETY DISORDER): ICD-10-CM

## 2024-04-25 DIAGNOSIS — F11.20 OPIOID DEPENDENCE WITH CURRENT USE: ICD-10-CM

## 2024-04-25 DIAGNOSIS — J32.9 BACTERIAL SINUSITIS: ICD-10-CM

## 2024-04-25 DIAGNOSIS — K58.0 IRRITABLE BOWEL SYNDROME WITH DIARRHEA: ICD-10-CM

## 2024-04-25 DIAGNOSIS — G47.00 INSOMNIA, UNSPECIFIED TYPE: ICD-10-CM

## 2024-04-25 DIAGNOSIS — Z00.00 ANNUAL PHYSICAL EXAM: Primary | ICD-10-CM

## 2024-04-25 DIAGNOSIS — B96.89 BACTERIAL SINUSITIS: ICD-10-CM

## 2024-04-25 PROBLEM — E66.811 CLASS 1 OBESITY DUE TO EXCESS CALORIES WITHOUT SERIOUS COMORBIDITY WITH BODY MASS INDEX (BMI) OF 32.0 TO 32.9 IN ADULT: Status: RESOLVED | Noted: 2023-01-30 | Resolved: 2024-04-25

## 2024-04-25 PROBLEM — R61 EXCESSIVE SWEATING: Status: RESOLVED | Noted: 2018-11-14 | Resolved: 2024-04-25

## 2024-04-25 PROBLEM — R15.2 FECAL URGENCY: Status: RESOLVED | Noted: 2019-03-27 | Resolved: 2024-04-25

## 2024-04-25 PROBLEM — E66.3 OVERWEIGHT (BMI 25.0-29.9): Status: RESOLVED | Noted: 2017-07-14 | Resolved: 2024-04-25

## 2024-04-25 PROBLEM — E66.09 CLASS 1 OBESITY DUE TO EXCESS CALORIES WITHOUT SERIOUS COMORBIDITY WITH BODY MASS INDEX (BMI) OF 32.0 TO 32.9 IN ADULT: Status: RESOLVED | Noted: 2023-01-30 | Resolved: 2024-04-25

## 2024-04-25 PROCEDURE — 3044F HG A1C LEVEL LT 7.0%: CPT | Mod: CPTII,,, | Performed by: NURSE PRACTITIONER

## 2024-04-25 PROCEDURE — 3075F SYST BP GE 130 - 139MM HG: CPT | Mod: CPTII,,, | Performed by: NURSE PRACTITIONER

## 2024-04-25 PROCEDURE — 99999 PR PBB SHADOW E&M-EST. PATIENT-LVL IV: CPT | Mod: PBBFAC,,, | Performed by: NURSE PRACTITIONER

## 2024-04-25 PROCEDURE — 99214 OFFICE O/P EST MOD 30 MIN: CPT | Mod: PBBFAC,PN | Performed by: NURSE PRACTITIONER

## 2024-04-25 PROCEDURE — 1160F RVW MEDS BY RX/DR IN RCRD: CPT | Mod: CPTII,,, | Performed by: NURSE PRACTITIONER

## 2024-04-25 PROCEDURE — 99396 PREV VISIT EST AGE 40-64: CPT | Mod: S$PBB,,, | Performed by: NURSE PRACTITIONER

## 2024-04-25 PROCEDURE — 99214 OFFICE O/P EST MOD 30 MIN: CPT | Mod: S$PBB,25,, | Performed by: NURSE PRACTITIONER

## 2024-04-25 PROCEDURE — 1159F MED LIST DOCD IN RCRD: CPT | Mod: CPTII,,, | Performed by: NURSE PRACTITIONER

## 2024-04-25 PROCEDURE — 3008F BODY MASS INDEX DOCD: CPT | Mod: CPTII,,, | Performed by: NURSE PRACTITIONER

## 2024-04-25 PROCEDURE — 3079F DIAST BP 80-89 MM HG: CPT | Mod: CPTII,,, | Performed by: NURSE PRACTITIONER

## 2024-04-25 RX ORDER — BROMPHENIRAMINE MALEATE, PSEUDOEPHEDRINE HYDROCHLORIDE, AND DEXTROMETHORPHAN HYDROBROMIDE 2; 30; 10 MG/5ML; MG/5ML; MG/5ML
10 SYRUP ORAL EVERY 4 HOURS PRN
Qty: 240 ML | Refills: 0 | Status: SHIPPED | OUTPATIENT
Start: 2024-04-25

## 2024-04-25 RX ORDER — AMOXICILLIN AND CLAVULANATE POTASSIUM 875; 125 MG/1; MG/1
1 TABLET, FILM COATED ORAL 2 TIMES DAILY
Qty: 20 TABLET | Refills: 0 | Status: SHIPPED | OUTPATIENT
Start: 2024-04-25 | End: 2024-05-05

## 2024-04-25 RX ORDER — AZELASTINE 1 MG/ML
1 SPRAY, METERED NASAL 2 TIMES DAILY
Qty: 30 ML | Refills: 0 | Status: SHIPPED | OUTPATIENT
Start: 2024-04-25 | End: 2025-04-25

## 2024-04-25 RX ORDER — OXYMETAZOLINE HCL 0.05 %
2 SPRAY, NON-AEROSOL (ML) NASAL 2 TIMES DAILY
COMMUNITY
End: 2024-04-25 | Stop reason: ALTCHOICE

## 2024-04-25 NOTE — PROGRESS NOTES
Subjective:       Patient ID: Judie Schmitz is a 43 y.o. female.    Chief Complaint: Annual Exam and URI (Sore throat/nasal congestion left ear pain)        Patient is a 43-year-old white female with ADHD, Generalized Anxiety Disorder, chronic intermittent low back pain, excessive sweating, IBS, Raynaud Disease followed by [facility], Insomnia followed by [facility], and compression fracture to T12 Vertebra followed by Chiropractor that is here today for ANNUAL PHYSICAL EXAM with fasting lab results. Patient has additional complaint of URI for 12 days that will be addressed in same progress note below.     ADHD   Followed by Psychiatry Ray Pregeant  ON Adderall 20 mg - 30 mg in AM and 20 mg mid-day  Stable on current medication  Last filled on 3/21/2024       Generalized Anxiety Disorder  On Medical Marijuana prescribed by Dr. Yelena Acosta    Insomnia  On Ambien 10 mg daily now prescribed by Psychiatry  Pregeant - last filled on 4/9/2024     IBS  On Amitriptyline 50 mg at bedtime by Dr. Hood  On Bentyl prn     History of Compression Fracture T12 vertebra and Chronic Back Pain  Fall occurred on 10/22/2022  Fell while roller skating at daughter's birthday party  Patient was followed by [facility] Specialist at [facility] and Chiropractor.  Now followed by Stephen Reveles - physical medicine and rehab  Prescribed Hydrocodone -APAP 10/325 mg #60 prescribed by Dr. Dalton Holman  Also has medical marijuana reported for anxiety and chronic pain    Opioid Dependence - daily use  Prescribed Hydrocodone -APAP 10/325 mg #60 prescribed by Dr. Dalton Holman  Also has medical marijuana reported for anxiety and chronic pain       History of Obesity  Body mass index is 26.28 kg/m².  Followed by [facility] weight loss program - started Semaglutide injections in September 2023  Stable.    Abnormal Mammogram  2/3/2023 -   Right  There is an asymmetry seen in the inner region of the right breast in  the posterior depth on the CC view. This is a new finding.   Diagnostic mammogram and ultrasound was ordered but patient was noncompliant with follow up.  She is now DUE TO REPEAT YEARLY MAMMOGRAM - states she want to have yearly screening and if still abnormality is present - then she agrees to do diagnostic mammogram and ultrasound    Osteopenia  DEXA SCAN 7/11/2023:  The L1 to L4 vertebral bone mineral density is equal to 0.962 g/cm squared with a T score of -1.9.  The left femoral neck bone mineral density is equal to 0.935 g/cm squared with a T score of -0.7.   There is a 5.0% risk of a major osteoporotic fracture and a 0.4% risk of hip fracture in the next 10 years (FRAX).   Impression:  Osteopenia  Advised to start Calcium with Vitamin D supplement  Repeat DEXA July 2026     Wellness Labs:  CBC okay  CMP WNL  TSH WNL  A1C 5.2%  Cholesterol levels within range     Health Maintenance:  Mammogram scheduled for 2/3/23  PAP done 5/18/2021  Declined flu and covid vaccines      ADDITIONAL COMPLAINT:      URI  Started with symptoms over 12 days ago and worsening  Nasal discharge, congestion, postnasal drip, sinus pressure and pain, nonproductive cough  Pressure to face.  Taking nasal spray and antihistamine without relief  + sinusitis  Will treat with Augmentin Antibiotic  Use flonase and astelin nasal spray daily, bromfed dm every 4 hour and ibuprofen 600 mg three times daily  Return to office if no improvement    Lab Visit on 04/23/2024   Component Date Value Ref Range Status    WBC 04/23/2024 5.75  3.90 - 12.70 K/uL Final    RBC 04/23/2024 5.03  4.00 - 5.40 M/uL Final    Hemoglobin 04/23/2024 14.0  12.0 - 16.0 g/dL Final    Hematocrit 04/23/2024 42.9  37.0 - 48.5 % Final    MCV 04/23/2024 85  82 - 98 fL Final    MCH 04/23/2024 27.8  27.0 - 31.0 pg Final    MCHC 04/23/2024 32.6  32.0 - 36.0 g/dL Final    RDW 04/23/2024 12.8  11.5 - 14.5 % Final    Platelets 04/23/2024 368  150 - 450 K/uL Final    MPV 04/23/2024  10.4  9.2 - 12.9 fL Final    Immature Granulocytes 04/23/2024 0.2  0.0 - 0.5 % Final    Gran # (ANC) 04/23/2024 2.4  1.8 - 7.7 K/uL Final    Immature Grans (Abs) 04/23/2024 0.01  0.00 - 0.04 K/uL Final    Comment: Mild elevation in immature granulocytes is non specific and   can be seen in a variety of conditions including stress response,   acute inflammation, trauma and pregnancy. Correlation with other   laboratory and clinical findings is essential.      Lymph # 04/23/2024 2.8  1.0 - 4.8 K/uL Final    Mono # 04/23/2024 0.5  0.3 - 1.0 K/uL Final    Eos # 04/23/2024 0.1  0.0 - 0.5 K/uL Final    Baso # 04/23/2024 0.06  0.00 - 0.20 K/uL Final    nRBC 04/23/2024 0  0 /100 WBC Final    Gran % 04/23/2024 41.8  38.0 - 73.0 % Final    Lymph % 04/23/2024 47.8  18.0 - 48.0 % Final    Mono % 04/23/2024 8.2  4.0 - 15.0 % Final    Eosinophil % 04/23/2024 1.0  0.0 - 8.0 % Final    Basophil % 04/23/2024 1.0  0.0 - 1.9 % Final    Differential Method 04/23/2024 Automated   Final    Sodium 04/23/2024 139  136 - 145 mmol/L Final    Potassium 04/23/2024 4.1  3.5 - 5.1 mmol/L Final    Chloride 04/23/2024 104  95 - 110 mmol/L Final    CO2 04/23/2024 23  23 - 29 mmol/L Final    Glucose 04/23/2024 83  70 - 110 mg/dL Final    BUN 04/23/2024 13  6 - 20 mg/dL Final    Creatinine 04/23/2024 0.9  0.5 - 1.4 mg/dL Final    Calcium 04/23/2024 10.0  8.7 - 10.5 mg/dL Final    Total Protein 04/23/2024 7.7  6.0 - 8.4 g/dL Final    Albumin 04/23/2024 4.1  3.5 - 5.2 g/dL Final    Total Bilirubin 04/23/2024 0.5  0.1 - 1.0 mg/dL Final    Comment: For infants and newborns, interpretation of results should be based  on gestational age, weight and in agreement with clinical  observations.    Premature Infant recommended reference ranges:  Up to 24 hours.............<8.0 mg/dL  Up to 48 hours............<12.0 mg/dL  3-5 days..................<15.0 mg/dL  6-29 days.................<15.0 mg/dL      Alkaline Phosphatase 04/23/2024 88  55 - 135 U/L Final     AST 04/23/2024 21  10 - 40 U/L Final    ALT 04/23/2024 16  10 - 44 U/L Final    eGFR 04/23/2024 >60.0  >60 mL/min/1.73 m^2 Final    Anion Gap 04/23/2024 12  8 - 16 mmol/L Final    Hemoglobin A1C 04/23/2024 5.2  4.0 - 5.6 % Final    Comment: ADA Screening Guidelines:  5.7-6.4%  Consistent with prediabetes  >or=6.5%  Consistent with diabetes    High levels of fetal hemoglobin interfere with the HbA1C  assay. Heterozygous hemoglobin variants (HbS, HgC, etc)do  not significantly interfere with this assay.   However, presence of multiple variants may affect accuracy.      Estimated Avg Glucose 04/23/2024 103  68 - 131 mg/dL Final    Cholesterol 04/23/2024 198  120 - 199 mg/dL Final    Comment: The National Cholesterol Education Program (NCEP) has set the  following guidelines (reference ranges) for Cholesterol:  Optimal.....................<200 mg/dL  Borderline High.............200-239 mg/dL  High........................> or = 240 mg/dL      Triglycerides 04/23/2024 94  30 - 150 mg/dL Final    Comment: The National Cholesterol Education Program (NCEP) has set the  following guidelines (reference values) for triglycerides:  Normal......................<150 mg/dL  Borderline High.............150-199 mg/dL  High........................200-499 mg/dL      HDL 04/23/2024 63  40 - 75 mg/dL Final    Comment: The National Cholesterol Education Program (NCEP) has set the  following guidelines (reference values) for HDL Cholesterol:  Low...............<40 mg/dL  Optimal...........>60 mg/dL      LDL Cholesterol 04/23/2024 116.2  63.0 - 159.0 mg/dL Final    Comment: The National Cholesterol Education Program (NCEP) has set the  following guidelines (reference values) for LDL Cholesterol:  Optimal.......................<130 mg/dL  Borderline High...............130-159 mg/dL  High..........................160-189 mg/dL  Very High.....................>190 mg/dL      HDL/Cholesterol Ratio 04/23/2024 31.8  20.0 - 50.0 % Final    Total  "Cholesterol/HDL Ratio 04/23/2024 3.1  2.0 - 5.0 Final    Non-HDL Cholesterol 04/23/2024 135  mg/dL Final    Comment: Risk category and Non-HDL cholesterol goals:  Coronary heart disease (CHD)or equivalent (10-year risk of CHD >20%):  Non-HDL cholesterol goal     <130 mg/dL  Two or more CHD risk factors and 10-year risk of CHD <= 20%:  Non-HDL cholesterol goal     <160 mg/dL  0 to 1 CHD risk factor:  Non-HDL cholesterol goal     <190 mg/dL      TSH 04/23/2024 1.992  0.400 - 4.000 uIU/mL Final       Vitals:    04/25/24 1524   BP: 130/86   BP Location: Left arm   Patient Position: Sitting   BP Method: Large (Manual)   Pulse: 103   Temp: 97.9 °F (36.6 °C)   TempSrc: Temporal   SpO2: 97%   Weight: 69.4 kg (153 lb 1.8 oz)   Height: 5' 4" (1.626 m)       Assessment:         ICD-10-CM ICD-9-CM   1. Annual physical exam  Z00.00 V70.0   2. History of compression fracture of spine  Z87.81 V15.51   3. Opioid dependence with current use  F11.20 304.00   4. ADHD (attention deficit hyperactivity disorder), inattentive type  F90.0 314.00   5. IVANA (generalized anxiety disorder)  F41.1 300.02   6. Insomnia, unspecified type  G47.00 780.52   7. Irritable bowel syndrome with diarrhea  K58.0 564.1   8. History of obesity  Z86.39 V12.29   9. Abnormal mammogram  R92.8 793.80   10. Osteopenia, unspecified location  M85.80 733.90   11. Bacterial sinusitis  J32.9 473.9    B96.89 041.9   12. Encounter for screening mammogram for malignant neoplasm of breast  Z12.31 V76.12       Plan:       1. Annual physical exam   Health Maintenance Summary   Full History      Expand All  Collapse All  Ordered - Mammogram   (Yearly)Ordered on 4/25/2024 02/03/2023  Mammo Digital Screening Bilat w/ [facility]   10/28/2019  Mammo Digital Screening Bilat w/ [facility]   Cervical Cancer Screening   (Pap Smear - Every 3 Years)Due soon on 5/18/2024 05/18/2021  Liquid-Based Pap Smear, Screening   10/31/2017  Liquid-based pap smear, screening   08/19/2015  Pap Smear (Done) "   Postponed - COVID-19 Vaccine   (1 - 2023-24 season)Postponed until 4/25/2025  No completion history exists for this topic.   Hemoglobin A1c (Diabetic Prevention Screening)   (Every 3 Years)Next due on 4/23/2027 04/23/2024  Hemoglobin A1C External component of Hemoglobin A1C   01/27/2023  Hemoglobin A1C External component of Hemoglobin A1C   TETANUS VACCINE   (Every 10 Years)Next due on 6/27/2029 06/27/2019  Imm Admin: Tdap   07/01/1994  Imm Admin: Td (ADULT)   Hepatitis C Screening  Completed  01/27/2023  Hepatitis C Ab component of Hepatitis C Antibody   HIV Screening  Completed  01/27/2023  HIV 1/2 Ag/Ab (4th Gen)   Lipid Panel  Completed  04/23/2024  Cholesterol Total component of Lipid Panel   01/27/2023  Cholesterol Total component of Lipid Panel   02/15/2018  Cholesterol Total component of Lipid panel   Influenza Vaccine   (Series Information)Addressed  04/25/2024  Declined   10/23/2018  Imm Admin: Influenza   10/03/2018  Imm Admin: Influenza - Quadrivalent - PF *Preferred* (6 months and older)   10/16/2017  Imm Admin: Influenza - Quadrivalent - PF *Preferred* (6 months and older)   10/12/2016  Imm Admin: Influenza - Quadrivalent - PF *Preferred* (6 months and older)   View More History   Pneumococcal Vaccines (Age 0-64)   (Series Information)Aged Out  No completion history exists for this topic.       2. History of compression fracture of spine  Overview:  History of Compression Fracture T12 vertebra and Chronic Back Pain  Fall occurred on 10/22/2022  Fell while roller skating at daughter's birthday party  Patient was [facility] by [facility] and Chiropractor.  Now followed by Stephen Reveles - physical medicine and rehab  Prescribed Hydrocodone -APAP 10/325 mg #60 prescribed by Dr. Dalton Holman  Also has medical marijuana reported for anxiety and chronic pain      3. Opioid dependence with current use  Overview:  History of Compression Fracture T12 vertebra and Chronic Back  Pain  Fall occurred on 10/22/2022  Fell while roller skating at daughter's birthday party  Patient was followed by Orthopedic Specialist at [facility] and Chiropractor.  Now followed by Stephen Reveles - physical medicine and rehab  Prescribed Hydrocodone -APAP 10/325 mg #60 prescribed by Dr. Dalton Holman  Also has medical marijuana reported for anxiety and chronic pain      4. ADHD (attention deficit hyperactivity disorder), inattentive type  Overview:  Followed by Psychiatry Ray Pregeant  ON Adderall 20 mg - 30 mg in AM and 20 mg mid-day  Stable on current medication  Last filled on 3/21/2024      5. IVANA (generalized anxiety disorder)  Overview:  On Medical Marijuana prescribed by Dr. Yelena Acosta      6. Insomnia, unspecified type  Overview:  On Ambien 10 mg daily now prescribed by Psychiatry  Pregeant - last filled on 4/9/2024      7. Irritable bowel syndrome with diarrhea  Overview:  On Amitriptyline 50 mg at bedtime by Dr. Hood  On Bentyl prn      8. History of obesity  Overview:  Body mass index is 26.28 kg/m².  Followed by [facility] weight loss program - started Semaglutide injections in September 2023  Stable.      9. Abnormal mammogram  Overview:  Abnormal Mammogram  2/3/2023 -   Right  There is an asymmetry seen in the inner region of the right breast in the posterior depth on the CC view. This is a new finding.   Diagnostic mammogram and ultrasound was ordered but patient was noncompliant with follow up.  She is now DUE TO REPEAT YEARLY MAMMOGRAM - states she want to have yearly screening and if still abnormality is present - then she agrees to do diagnostic mammogram and ultrasound      10. Osteopenia, unspecified location  Overview:  DEXA SCAN 7/11/2023:  The L1 to L4 vertebral bone mineral density is equal to 0.962 g/cm squared with a T score of -1.9.  The left femoral neck bone mineral density is equal to 0.935 g/cm squared with a T score of -0.7.   There is a 5.0% risk of a major osteoporotic  fracture and a 0.4% risk of hip fracture in the next 10 years (FRAX).   Impression:  Osteopenia  Advised to start Calcium with Vitamin D supplement  Repeat DEXA July 2026      11. Bacterial sinusitis  Started with symptoms over 12 days ago and worsening  Nasal discharge, congestion, postnasal drip, sinus pressure and pain, nonproductive cough  Pressure to face.  Taking nasal spray and antihistamine without relief  + sinusitis  Will treat with Augmentin Antibiotic  Use flonase and astelin nasal spray daily, bromfed dm every 4 hour and ibuprofen 600 mg three times daily  Return to office if no improvement    Comments:  watch and wait for augmentin  Orders:  -     azelastine (ASTELIN) 137 mcg (0.1 %) nasal spray; 1 spray (137 mcg total) by Nasal route 2 (two) times daily.  Dispense: 30 mL; Refill: 0  -     brompheniramine-pseudoeph-DM (BROMFED DM) 2-30-10 mg/5 mL Syrp; Take 10 mLs by mouth every 4 (four) hours as needed (congestion/cough).  Dispense: 240 mL; Refill: 0  -     amoxicillin-clavulanate 875-125mg (AUGMENTIN) 875-125 mg per tablet; Take 1 tablet by mouth 2 (two) times daily. for 10 days  Dispense: 20 tablet; Refill: 0    12. Encounter for screening mammogram for malignant neoplasm of breast  -     Mammo Digital Screening Bilat w/ [facility]; Future; Expected date: 04/25/2024       Follow up in about 1 year (around 4/25/2025) for fasting labs and wellness exam.     Patient's Medications   New Prescriptions    AMOXICILLIN-CLAVULANATE 875-125MG (AUGMENTIN) 875-125 MG PER TABLET    Take 1 tablet by mouth 2 (two) times daily. for 10 days    BROMPHENIRAMINE-PSEUDOEPH-DM (BROMFED DM) 2-30-10 MG/5 ML SYRP    Take 10 mLs by mouth every 4 (four) hours as needed (congestion/cough).   Previous Medications    AMITRIPTYLINE (ELAVIL) 50 MG TABLET    Take 1 tablet (50 mg total) by mouth every evening.    ASCORBIC ACID, VITAMIN C, (VITAMIN C) 1000 MG TABLET    Take 1,000 mg by mouth once daily.    DEXTROAMPHETAMINE-AMPHETAMINE  (ADDERALL) 20 MG TABLET    One and a half tablets by mouth in the morning and one tablet by mouth in the afternoon    DICYCLOMINE (BENTYL) 10 MG CAPSULE    Take 1 capsule (10 mg total) by mouth 4 (four) times daily as needed.    FLUTICASONE PROPIONATE (FLONASE) 50 MCG/ACTUATION NASAL SPRAY    1 spray (50 mcg total) by Each Nostril route once daily.    HYDROCODONE-ACETAMINOPHEN (NORCO)  MG PER TABLET    Take 1 tablet twice a day  as needed    IBUPROFEN (ADVIL,MOTRIN) 800 MG TABLET    Take 1 tablet (800 mg total) by mouth every 6 (six) hours as needed for Pain.    LIDOCAINE (LIDODERM) 5 %    Place 1 patch onto the skin once daily. Remove & Discard patch within 12 hours or as directed by MD    MAGNESIUM ORAL    Take by mouth.    MV-MIN/IRON/FOLIC/CALCIUM/VITK (WOMEN'S MULTIVITAMIN ORAL)    Take by mouth.    SEMAGLUTIDE, WEIGHT LOSS, SUBQ    Inject into the skin. Started September 2023 - Jose    TIZANIDINE (ZANAFLEX) 4 MG TABLET    Take 1 tablet by mouth every 8 (eight) hours as needed (muscle spasms)    UNABLE TO FIND    medication name: MEDICAL MARIJUANA - PINEAPPLE CANNABIS INFUSE,    ZOLPIDEM (AMBIEN) 10 MG TAB    TAKE ONE TABLET BY MOUTH NIGHTLY BEFORE BEDTIME AS NEEDED FOR INSOMNIA   Modified Medications    Modified Medication Previous Medication    AZELASTINE (ASTELIN) 137 MCG (0.1 %) NASAL SPRAY azelastine (ASTELIN) 137 mcg (0.1 %) nasal spray       1 spray (137 mcg total) by Nasal route 2 (two) times daily.    1 spray (137 mcg total) by Nasal route 2 (two) times daily.   Discontinued Medications    CLONAZEPAM (KLONOPIN) 0.5 MG DISINTEGRATING TABLET    Dissolve 1 tablet (0.5 mg total) by mouth nightly as needed (anxiety).    ETONOGESTREL-ETHINYL ESTRADIOL (NUVARING) 0.12-0.015 MG/24 HR VAGINAL RING    Place 1 each vaginally every 21 days. Insert one (1) ring vaginally and leave in place for three (3) weeks, then remove for one (1) week.    MELOXICAM (MOBIC) 15 MG TABLET    Take 1 tablet (15 mg total) by  mouth once daily.    OXYMETAZOLINE (AFRIN) 0.05 % NASAL SPRAY    2 sprays by Nasal route 2 (two) times daily.    PANTOPRAZOLE (PROTONIX) 40 MG TABLET    Take 1 tablet (40 mg total) by mouth once daily.         Review of Systems   Constitutional:  Positive for fatigue.   HENT:  Positive for postnasal drip, rhinorrhea, sinus pressure, sinus pain, sneezing and sore throat.    Respiratory:  Positive for cough.    Musculoskeletal:  Positive for back pain and myalgias.   Psychiatric/Behavioral:  The patient is nervous/anxious.          Objective:        Physical Exam  Constitutional:       General: She is not in acute distress.     Appearance: Normal appearance. She is not toxic-appearing or diaphoretic.      Comments: Body mass index is 26.28 kg/m².     HENT:      Head: Normocephalic and atraumatic.      Right Ear: Tympanic membrane, ear canal and external ear normal.      Left Ear: Tympanic membrane, ear canal and external ear normal.      Nose: Congestion and rhinorrhea present.      Mouth/Throat:      Pharynx: Posterior oropharyngeal erythema present. No oropharyngeal exudate.   Eyes:      General:         Right eye: No discharge.         Left eye: No discharge.      Extraocular Movements: Extraocular movements intact.      Conjunctiva/sclera: Conjunctivae normal.   Cardiovascular:      Rate and Rhythm: Normal rate and regular rhythm.      Heart sounds: Normal heart sounds.   Pulmonary:      Effort: Pulmonary effort is normal. No respiratory distress.      Breath sounds: Normal breath sounds. No stridor. No wheezing, rhonchi or rales.   Abdominal:      General: There is no distension.   Musculoskeletal:         General: No swelling or deformity. Normal range of motion.      Cervical back: Normal range of motion. No tenderness.      Right lower leg: No edema.      Left lower leg: No edema.   Lymphadenopathy:      Cervical: No cervical adenopathy.   Skin:     General: Skin is warm and dry.   Neurological:      Mental  Status: She is alert and oriented to person, place, and time.   Psychiatric:         Mood and Affect: Mood normal.         Behavior: Behavior normal.         Thought Content: Thought content normal.         Judgment: Judgment normal.             Past Medical History:   Diagnosis Date    Abnormal Pap smear of cervix age 34    no treatment; repeat PAPs normal    ADHD (attention deficit hyperactivity disorder), inattentive type 2007    diagnosed by Dr. Denny - taking Adderall 10 mg twice daily from 2007 to  - increased Adderall to 20 mg twice daily until  - patient quit school and got off med - started back on Adderall 10 in  but then off med when had baby in  - has not been on med since having baby in .    Anxiety and depression     Compression fracture of T12 vertebra 10/22/2022    followed by Chiropractor, Stephen Reveles and Orthopedic MD    IVANA (generalized anxiety disorder) 2021    Followed by [facility]  JIMMY Padgett NP    IBS (irritable bowel syndrome)     Insomnia     Raynaud's disease     followed by Rheumatology Dr. Villavicencio       Past Surgical History:   Procedure Laterality Date    ADENOIDECTOMY      APPENDECTOMY       SECTION      x2 2002-10/21/2013    COLONOSCOPY N/A 2022    Procedure: COLONOSCOPY;  Surgeon: Cheryl Hood MD;  Location: [facility];  Service: Endoscopy;  Laterality: N/A;    HYSTEROSCOPY WITH DILATION AND CURETTAGE OF UTERUS N/A 2022    Procedure: HYSTEROSCOPY, WITH DILATION AND CURETTAGE OF UTERUS;  Surgeon: Jigar Denny MD;  Location: [facility] OR;  Service: OB/GYN;  Laterality: N/A;    LAPAROSCOPIC APPENDECTOMY N/A 2019    Procedure: APPENDECTOMY, LAPAROSCOPIC (ADD ON );  Surgeon: Erasto Hendricks MD;  Location: [facility] OR;  Service: General;  Laterality: N/A;  (ADD ON )    THERMAL ABLATION OF ENDOMETRIUM N/A 2022    Procedure: ABLATION, ENDOMETRIUM, THERMAL (NOVASURE);  Surgeon:  Jigar Denny MD;  Location: [facility];  Service: OB/GYN;  Laterality: N/A;       Family History   Problem Relation Name Age of Onset    Hypertension Mother Mom     COPD Mother Mom 50    Rheum arthritis Mother Mom     Arthritis Mother Mom     Colon polyps Father Father     Cancer Father Father 64        prostate cancer    Stroke Maternal Grandmother Gma     Pneumonia Maternal Grandmother Gma         passed age 88    Cancer Maternal Grandfather Dads dad         brain tumor dont know what age he passed    Suicide Paternal Grandfather          passed in his 50's    No Known Problems Sister      Montoya syndrome Sister Sister     Heart disease Sister Sister     Thyroid disease Sister Sister     Breast cancer Paternal Aunt      Colon cancer Neg Hx      Ovarian cancer Neg Hx         Social History     Socioeconomic History    Marital status:     Number of children: 2   Occupational History    Occupation:    Tobacco Use    Smoking status: Never    Smokeless tobacco: Never   Substance and Sexual Activity    Alcohol use: Not Currently     Comment: occasional    Drug use: No    Sexual activity: Yes     Partners: Male     Comment:    Social History Narrative    Lives in [place].

## 2024-05-01 ENCOUNTER — PATIENT MESSAGE (OUTPATIENT)
Dept: GASTROENTEROLOGY | Facility: CLINIC | Age: 44
End: 2024-05-01
Payer: MEDICAID

## 2024-05-01 ENCOUNTER — PATIENT MESSAGE (OUTPATIENT)
Dept: FAMILY MEDICINE | Facility: CLINIC | Age: 44
End: 2024-05-01
Payer: MEDICAID

## 2024-05-02 RX ORDER — FLUCONAZOLE 150 MG/1
150 TABLET ORAL DAILY
Qty: 1 TABLET | Refills: 0 | Status: SHIPPED | OUTPATIENT
Start: 2024-05-02 | End: 2024-05-03

## 2024-06-18 ENCOUNTER — OFFICE VISIT (OUTPATIENT)
Dept: PODIATRY | Facility: CLINIC | Age: 44
End: 2024-06-18
Payer: MEDICAID

## 2024-06-18 VITALS — RESPIRATION RATE: 18 BRPM | BODY MASS INDEX: 25.97 KG/M2 | HEIGHT: 64 IN | WEIGHT: 152.13 LBS

## 2024-06-18 DIAGNOSIS — M72.2 PLANTAR FASCIITIS OF RIGHT FOOT: Primary | ICD-10-CM

## 2024-06-18 PROCEDURE — 3044F HG A1C LEVEL LT 7.0%: CPT | Mod: CPTII,,, | Performed by: PODIATRIST

## 2024-06-18 PROCEDURE — 1160F RVW MEDS BY RX/DR IN RCRD: CPT | Mod: CPTII,,, | Performed by: PODIATRIST

## 2024-06-18 PROCEDURE — 3008F BODY MASS INDEX DOCD: CPT | Mod: CPTII,,, | Performed by: PODIATRIST

## 2024-06-18 PROCEDURE — 99999PBSHW PR PBB SHADOW TECHNICAL ONLY FILED TO HB: Mod: PBBFAC,,,

## 2024-06-18 PROCEDURE — 20550 NJX 1 TENDON SHEATH/LIGAMENT: CPT | Mod: PBBFAC,PN,RT | Performed by: PODIATRIST

## 2024-06-18 PROCEDURE — 1159F MED LIST DOCD IN RCRD: CPT | Mod: CPTII,,, | Performed by: PODIATRIST

## 2024-06-18 PROCEDURE — 99214 OFFICE O/P EST MOD 30 MIN: CPT | Mod: PBBFAC,PN,25 | Performed by: PODIATRIST

## 2024-06-18 PROCEDURE — 99999 PR PBB SHADOW E&M-EST. PATIENT-LVL IV: CPT | Mod: PBBFAC,,, | Performed by: PODIATRIST

## 2024-06-18 PROCEDURE — 99203 OFFICE O/P NEW LOW 30 MIN: CPT | Mod: 25,S$PBB,, | Performed by: PODIATRIST

## 2024-06-18 RX ORDER — DEXAMETHASONE SODIUM PHOSPHATE 4 MG/ML
4 INJECTION, SOLUTION INTRA-ARTICULAR; INTRALESIONAL; INTRAMUSCULAR; INTRAVENOUS; SOFT TISSUE
Status: DISCONTINUED | OUTPATIENT
Start: 2024-06-18 | End: 2024-06-18 | Stop reason: HOSPADM

## 2024-06-18 RX ADMIN — DEXAMETHASONE SODIUM PHOSPHATE 4 MG: 4 INJECTION, SOLUTION INTRAMUSCULAR; INTRAVENOUS at 04:06

## 2024-06-18 NOTE — PROGRESS NOTES
[facility] - PODIATRY  [address]  [address]  [place] [address]  Dept: [phone]  Dept Fax: [phone]    Tee Byrd Jr., DPM     Assessment:   [facility]    Coding  1. Plantar fasciitis of right foot            Plan:     Tendon Sheath: R plantar fascia    Date/Time: 6/18/2024 4:52 PM    Performed by: Tee Byrd Jr., DPM  Authorized by: Tee Byrd Jr., DPM    Consent Done?:  Yes (Verbal)  Indications:  Pain  Site marked: the procedure site was marked    Timeout: prior to procedure the correct patient, procedure, and site was verified    Prep: patient was prepped and draped in usual sterile fashion      Local anesthesia used?: Yes    Anesthesia:  Local infiltration  Local anesthetic:  Co-phenylcaine spray and bupivacaine 0.5% without epinephrine  Anesthetic total (ml):  2    Location:  Foot  Site:  R plantar fascia  Ultrasonic guidance for needle placement?: No    Needle size:  25 G  Approach:  Medial  Medications:  4 mg dexAMETHasone 4 mg/mL  Patient tolerance:  Patient tolerated the procedure well with no immediate complications      Judie was seen today for foot pain.    Diagnoses and all orders for this visit:    Plantar fasciitis of right foot    Other orders  -     Tendon Sheath        -pt seen, evaluated, and managed  -dx discussed in detail. All questions/concerns addressed  -all tx options discussed. All alternatives, risks, benefits of all txs discussed  -the patient was educated about the diagnosis  -We discussed conservative care options possible including but not limited to shoe wear and/or padding, bracing/strapping, at home ROM, formal PT, medical therapy, injection therapy  - The utilization of NSAIDs can be considered but their benefit has to be tempered against the risk of GI/ concerns  - A steroid injection can be undertaken.  We did discuss the potential mechanism of action of this shot.  Understanding that multiple injections at the same anatomic  site do have deleterious effects on the soft tissue.  Generic risks include: steroid flare (advised to ice if necessary), skin hypo-pgimentation (which can be permanent and unsightly), elevation of blood sugar, subcutaneous atrophy (can be permanent) and infection.   -XR foot on way out  -labs reviewed by me: keren chavarria  -implemented icing/stretching regimen  -heel lifts dispensed        -rxs dispensed: none  -referrals: none  -WB: wbat      Follow up if symptoms worsen or fail to improve.    Subjective:      Patient ID: Judie Schmitz is a 44 y.o. female.    Chief Complaint:   Chief Complaint   Patient presents with    Foot Pain     Right        CC - foot pain: patient presents to the podiatry clinic  with complaint of  right foot pain. Onset of the symptoms was several months ago. Precipitating event: unk. Current symptoms include: ability to bear weight, but with some pain, lynda the heel, swelling and worsening symptoms after a period of inactivity. Aggravating factors: walking and certain shoegear. Symptoms have gradually worsened. Patient has had no prior foot problems. Evaluation to date: none. Treatment to date: avoidance of offending activity. Patients rates pain 6/10 on pain scale.      Foot Pain        Last Podiatry Enc: Visit date not found  Last Enc w/ Me: Visit date not found    Outside reports reviewed: historical medical records.  Family hx: as below  Past Medical History:   Diagnosis Date    Abnormal Pap smear of cervix age 34    no treatment; repeat PAPs normal    ADHD (attention deficit hyperactivity disorder), inattentive type 09/13/2007    diagnosed by Dr. Denny - taking Adderall 10 mg twice daily from 9/2007 to 2009 - increased Adderall to 20 mg twice daily until 2011 - patient quit school and got off med - started back on Adderall 10 in 2012 but then off med when had baby in 2013 - has not been on med since having baby in 2013.    Anxiety and depression     Compression fracture of T12  vertebra 10/22/2022    followed by Chiropractor, Stephen Reveles and Orthopedic MD    IVANA (generalized anxiety disorder) 2021    Followed by [facility] Patrice Padgett NP    IBS (irritable bowel syndrome)     Insomnia     Osteopenia 2024    DEXA SCAN 2023:  The L1 to L4 vertebral bone mineral density is equal to 0.962 g/cm squared with a T score of -1.9.  The left femoral neck bone mineral density is equal to 0.935 g/cm squared with a T score of -0.7.   There is a 5.0% risk of a major osteoporotic fracture and a 0.4% risk of hip fracture in the next 10 years (FRAX).   Impression:  Osteopenia  Advised to start Calcium with Vitami    Raynaud's disease     followed by Rheumatology Dr. Villavicencio     Past Surgical History:   Procedure Laterality Date    ADENOIDECTOMY      APPENDECTOMY       SECTION      x2 2002-10/21/2013    COLONOSCOPY N/A 2022    Procedure: COLONOSCOPY;  Surgeon: Cheryl Hood MD;  Location: [facility];  Service: Endoscopy;  Laterality: N/A;    HYSTEROSCOPY WITH DILATION AND CURETTAGE OF UTERUS N/A 2022    Procedure: HYSTEROSCOPY, WITH DILATION AND CURETTAGE OF UTERUS;  Surgeon: Jigar Denny MD;  Location: [facility];  Service: OB/GYN;  Laterality: N/A;    LAPAROSCOPIC APPENDECTOMY N/A 2019    Procedure: APPENDECTOMY, LAPAROSCOPIC (ADD ON );  Surgeon: Erasto Hendricks MD;  Location: [facility] OR;  Service: General;  Laterality: N/A;  (ADD ON )    THERMAL ABLATION OF ENDOMETRIUM N/A 2022    Procedure: ABLATION, ENDOMETRIUM, THERMAL (NOVASURE);  Surgeon: Jigar Denny MD;  Location: [facility] OR;  Service: OB/GYN;  Laterality: N/A;     Family History   Problem Relation Name Age of Onset    Hypertension Mother Mom     COPD Mother Mom 50    Rheum arthritis Mother Mom     Arthritis Mother Mom     Colon polyps Father Father     Cancer Father Father 64        prostate cancer    Stroke Maternal Grandmother Gma     Pneumonia  Maternal Grandmother Gma         passed age 88    Cancer Maternal Grandfather Dads dad         brain tumor dont know what age he passed    Suicide Paternal Grandfather          passed in his 50's    No Known Problems Sister      Montoya syndrome Sister Sister     Heart disease Sister Sister     Thyroid disease Sister Sister     Breast cancer Paternal Aunt      Colon cancer Neg Hx      Ovarian cancer Neg Hx       Current Outpatient Medications   Medication Sig Dispense Refill    amitriptyline (ELAVIL) 50 MG tablet Take 1 tablet (50 mg total) by mouth every evening. 90 tablet 3    ascorbic acid, vitamin C, (VITAMIN C) 1000 MG tablet Take 1,000 mg by mouth once daily.      azelastine (ASTELIN) 137 mcg (0.1 %) nasal spray 1 spray (137 mcg total) by Nasal route 2 (two) times daily. 30 mL 0    brompheniramine-pseudoeph-DM (BROMFED DM) 2-30-10 mg/5 mL Syrp Take 10 mLs by mouth every 4 (four) hours as needed (congestion/cough). 240 mL 0    dextroamphetamine-amphetamine (ADDERALL) 20 mg tablet One and a half tablets by mouth in the morning and one tablet by mouth in the afternoon 75 tablet 0    dicyclomine (BENTYL) 10 MG capsule Take 1 capsule (10 mg total) by mouth 4 (four) times daily as needed. 120 capsule 5    fluticasone propionate (FLONASE) 50 mcg/actuation nasal spray 1 spray (50 mcg total) by Each Nostril route once daily. 16 g 0    HYDROcodone-acetaminophen (NORCO)  mg per tablet Take 1 tablet twice a day  as needed 60 tablet 0    ibuprofen (ADVIL,MOTRIN) 800 MG tablet Take 1 tablet (800 mg total) by mouth every 6 (six) hours as needed for Pain. 20 tablet 0    LIDOcaine (LIDODERM) 5 % Place 1 patch onto the skin once daily. Remove & Discard patch within 12 hours or as directed by MD 30 patch 0    MAGNESIUM ORAL Take by mouth.      mv-min/iron/folic/calcium/vitK (WOMEN'S MULTIVITAMIN ORAL) Take by mouth.      SEMAGLUTIDE, WEIGHT LOSS, SUBQ Inject into the skin. Started September 2023 - Jose      tiZANidine  (ZANAFLEX) 4 MG tablet Take 1 tablet by mouth every 8 (eight) hours as needed (muscle spasms) 30 tablet 3    UNABLE TO FIND medication name: MEDICAL MARIJUANA - PINEAPPLE CANNABIS INFUSE,      zolpidem (AMBIEN) 10 mg Tab TAKE ONE TABLET BY MOUTH NIGHTLY BEFORE BEDTIME AS NEEDED FOR INSOMNIA 30 tablet 5     No current facility-administered medications for this visit.     Review of patient's allergies indicates:   Allergen Reactions    Zoloft [sertraline]      Ringing in ears     Tetanus vaccines and toxoid      Social History     Socioeconomic History    Marital status:     Number of children: 2   Occupational History    Occupation:    Tobacco Use    Smoking status: Never    Smokeless tobacco: Never   Substance and Sexual Activity    Alcohol use: Not Currently     Comment: occasional    Drug use: No    Sexual activity: Yes     Partners: Male     Comment:    Social History Narrative    Lives in [place].        ROS    REVIEW OF SYSTEMS: Negative as documented below as well as positive findings in bold.       Constitutional  Respiratory  Gastrointestinal  Skin   - Fever - Cough - Heartburn - Rash   - Chills - Spit blood - Nausea - Itching   - Weight Loss - Shortness of breath - Vomiting - Nail pain   - Malaise/Fatigue - Wheezing - Abdominal Pain  Wound/Ulcer   - Weight Gain   - Blood in Stool  Poor wound healing       - Diarrhea          Cardiovascular  Genitourinary  Neurological  HEENT   - Chest Pain - Dysuria - Burning Sensation of feet - Headache   - Palpitations - Hematuria - Tingling / Paresthesia - Congestion   - Pain at night in legs - Flank Pain - Dizziness - Sore Throat   - Cramping   - Tremor - Blurred Vision   - Leg Swelling   - Sensory Change - Double Vision   - Dizzy when standing   - Speech Change - Eye Redness       - Focal Weakness - Dry Eyes       - Loss of Consciousness          Endocrine  Musculoskeletal  Psychiatric   - Cold intolerance - Muscle Pain - Depression   -  "Heat intolerance - Neck Pain - Insomnia   - Anemia - Joint Pain - Memory Loss   -  Easy bruising, bleeding - Heel pain - Anxiety      Toe Pain        Leg/Ankle/Foot Pain         Objective:     Resp 18   Ht 5' 4" (1.626 m)   Wt 69 kg (152 lb 1.9 oz)   BMI 26.11 kg/m²   Vitals:    06/18/24 1440   Resp: 18   Weight: 69 kg (152 lb 1.9 oz)   Height: 5' 4" (1.626 m)   PainSc:   8   PainLoc: Foot       Physical Exam    General Appearance:   Patient appears well developed, well nourished  Patient appears stated age    Psychiatric:   Patient is oriented to time, place, and person.  Patient has appropriate mood and affect    Neck:  Trachea Midline  No visible masses    Respiratory/Ears:  No distress or labored breathing.  Able to differentiate between normal talking voice and whisper.  Able to follow commands    Eyes:  Visual Acuity intact  Lids and conjunctivae normal. No discoloration noted.    Foot Exam  Physical Exam  Ortho Exam  Ortho/SPM Exam  Foot/Ankle Musculoskeletal Exam    R LE exam con't:  V:  DP 2/4, PT 2/4   CRT< 3s to all digits tested   Tibial and popliteal lymph nodes are w/o abnormality   Edema: absent, varicosities: absent    N:  Patient displays normal ankle reflexes   SILT in SP/DP/T/Lizbeth/Saph distributions    Ortho: +Motor EHL/FHL/TA/GA   equinus deformity present  There is moderate pain with palpation of R medial calcaneal tubercle  Compartments soft/compressible. No pain on passive stretch of big toe. No calf  Pain.    Derm:  skin intact, skin warm and dry, skin without ulcers or lesions, skin without induration, nails normal, texture turgor well hydrated      Imaging / Labs:      No results found.      Note: This was dictated using a computer transcription program. Although proofread, it may contain computer transcription errors and phonetic errors. Other human proofreading errors may also exist. Corrections may be performed at a later time. Please contact us for any clarification if " needed.    Tee Byrd,  KIRKM  [facility]

## 2024-06-18 NOTE — PATIENT INSTRUCTIONS
Heel Pain (Plantar Fasciitis)        Heel pain is most often caused by plantar fasciitis, a condition that is sometimes also called heel spur syndrome when a spur is present. Heel pain may also be due to other causes, such as a stress fracture, tendonitis, arthritis, nerve irritation or, rarely, a cyst.    Because there are several potential causes, it is important to have heel pain properly diagnosed. A foot and ankle surgeon is able to distinguish between all the possibilities and to determine the underlying source of your heel pain.    What Is Plantar Fasciitis?  Heel pain is often caused by plantar fasciitis  Plantar fasciitis is an inflammation of the band of tissue (the plantar fascia) that extends from the heel to the toes. In this condition, the fascia first becomes irritated and then inflamed, resulting in heel pain.    Causes  The most common cause of plantar fasciitis relates to faulty structure of the foot. For example, people who have problems with their arches, either overly flat feet or high-arched feet, are more prone to developing plantar fasciitis.    Wearing nonsupportive footwear on hard, flat surfaces puts abnormal strain on the plantar fascia and can also lead to plantar fasciitis. This is particularly evident when ones job requires long hours on the feet. Obesity and overuse may also contribute to plantar fasciitis.    Symptoms  The symptoms of plantar fasciitis are:    Pain on the bottom of the heel  Pain in the arch of the foot  Pain that is usually worse upon arising  Pain that increases over a period of months  Swelling on the bottom of the heel     People with plantar fasciitis often describe the pain as worse when they get up in the morning or after they have been sitting for long periods of time. After a few minutes of walking, the pain decreases because walking stretches the fascia. For some people, the pain subsides but returns after spending long periods of time on their  feet.    Diagnosis  To arrive at a diagnosis, the foot and ankle surgeon will obtain your medical history and examine your foot. Throughout this process, the surgeon rules out all possible causes for your heel pain other than plantar fasciitis.    In addition, diagnostic imaging studies, such as x-rays or other imaging modalities, may be used to distinguish the different types of heel pain. Sometimes heel spurs are found in patients with plantar fasciitis, but these are rarely a source of pain. When they are present, the condition may be diagnosed as plantar fasciitis/heel spur syndrome.    Nonsurgical Treatment  Treatment of plantar fasciitis begins with first-line strategies, which you can begin at home:    -Stretching exercises. Exercises that stretch out the calf muscles help ease pain and assist with recovery.  -Avoid going barefoot. When you walk without shoes, you put undue strain and stress on your plantar fascia.  -Ice. Putting an ice pack on your heel for 20 minutes several times a day helps reduce inflammation. Place a thin towel between the ice and your heel; do not apply ice directly to the skin.  -Limit activities. Cut down on extended physical activities to give your heel a rest.  -Shoe modifications. Wearing supportive shoes that have good arch support and a slightly raised heel reduces stress on the plantar fascia.  -Medications. Oral nonsteroidal anti-inflammatory drugs (NSAIDs), such as ibuprofen, may be recommended to reduce pain and inflammation.     If you still have pain after several weeks, see your foot and ankle surgeon, who may add one or more of these treatment approaches:    -Padding, taping and strapping. Placing pads in the shoe softens the impact of walking. Taping and strapping help support the foot and reduce strain on the fascia.  -Orthotic devices. Custom orthotic devices that fit into your shoe help correct the underlying structural abnormalities causing the plantar  fasciitis.  -Injection therapy. In some cases, corticosteroid injections are used to help reduce the inflammation and relieve pain.  -Removable walking cast. A removable walking cast may be used to keep your foot immobile for a few weeks to allow it to rest and heal.  -Night splint. Wearing a night splint allows you to maintain an extended stretch of the plantar fascia while sleeping. This may help reduce the morning pain experienced by some patients.  -Physical therapy. Exercises and other physical therapy measures may be used to help provide relief.     When Is Surgery Needed?  Although most patients with plantar fasciitis respond to nonsurgical treatment, a small percentage of patients may require surgery. If, after several months of nonsurgical treatment, you continue to have heel pain, surgery will be considered. Your foot and ankle surgeon will discuss the surgical options with you and determine which approach would be most beneficial for you.    Long-Term Care  No matter what kind of treatment you undergo for plantar fasciitis, the underlying causes that led to this condition may remain. Therefore, you will need to continue with preventive measures. Wearing supportive shoes, stretching and using custom orthotic devices are the mainstay of long-term treatment for plantar fasciitis.            Understanding Heel Pain  Your heel is the back part of your foot. A band of tissue called the plantar fascia connects the heel bone to the bones in the ball of your foot. Nerves run from the heel up the inside of your ankle and into your leg. When you feel pain in the bottom of your heel, the plantar fascia may be inflamed. Overuse, Achilles tightness, or excess body weight can cause the tissue to tear or pull away from the bone. Sometimes the inflamed plantar fascia also irritates a nerve, causing more pain.    What causes heel pain?  Wearing shoes with poor cushioning can irritate the tissue in your heel (plantar  fascia). Being overweight or standing for long periods can also irritate the tissue. Running, walking, tennis, and other sports that put stress on the heels can cause tiny tears in the tissue. If your lower leg muscles are tight, this is more likely to occur. A tight Achilles tendon will also contribute to heel pain.  Symptoms  You may feel pain on the bottom or on the inside edge of your heel. The pain may be sharp when you get out of bed or when you stand up after sitting for a while. You may feel a dull ache in your heel after youve been standing for a long time on a hard surface. Running can also cause a dull ache.  Preventing future problems  To prevent future heel pain, wear shoes with well-cushioned heels. And do exercises prescribed by your healthcare provider to stretch the plantar fascia and the muscles in the lower leg.   Date Last Reviewed: 9/10/2015  © 7376-7254 [employer]. [address] [place] [address]. All rights reserved. This information is not intended as a substitute for professional medical care. Always follow your healthcare professional's instructions.      Treating Plantar Fasciitis  First, your healthcare provider tries to determine the cause of your problem in order to suggest ways to relieve pain. If your pain is due to poor foot mechanics, custom-made shoe inserts (orthoses) may help.    Reduce symptoms  To relieve mild symptoms, try aspirin, ibuprofen, or other medicines as directed. Rubbing ice on the affected area may also help.  To reduce severe pain and swelling, your healthcare provider may prescribe pills or injections or a walking cast in some instances. Physical therapy, such as ultrasound or a daily stretching program, may also be recommended. Surgery is rarely required.  To reduce symptoms caused by poor foot mechanics, your foot may be taped. This supports the arch and temporarily controls movement. Night splints may also help by stretching the  fascia.  Control movement  If taping helps, your healthcare provider may prescribe orthoses. Built from plaster casts of your feet, these inserts control the way your foot moves. As a result, your symptoms should go away.  Reduce overuse  Every time your foot strikes the ground, the plantar fascia is stretched. You can reduce the strain on the plantar fascia and the possibility of overuse by following these suggestions:  Lose any excess weight.  Avoid running on hard or uneven ground.  Use orthoses at all times in your shoes and house slippers.  If surgery is needed  Your healthcare provider may consider surgery if other types of treatment don't control your pain. During surgery, the plantar fascia is partially cut to release tension. As you heal, fibrous tissue fills the space between the heel bone and the plantar fascia.   Date Last Reviewed: 10/14/2015  © 8604-8376 [employer]. [address] [place] [address]. All rights reserved. This information is not intended as a substitute for professional medical care. Always follow your healthcare professional's instructions.      Equinus          What Is Equinus?    Equinus is a condition in which the upward bending motion of the ankle joint is limited. Someone with equinus lacks the flexibility to bring the top of the foot toward the front of the leg. Equinus can occur in one or both feet. When it involves both feet, the limitation of motion is sometimes worse in one foot than in the other.    People with equinus develop ways to compensate for their limited ankle motion, and this often leads to other foot, leg or back problems. The most common methods of compensation are flattening of the arch or picking up the heel early when walking, placing increased pressure on the ball of the foot. Other patients compensate by toe walking, while a smaller number take steps by bending abnormally at the hip or knee.    Causes  There are several possible causes  for the limited range of ankle motion. Often, it is due to tightness in the Achilles tendon or calf muscles (the soleus muscle and/or gastrocnemius muscle). In some patients, this tightness is congenital (present at birth), and sometimes it is an inherited trait. Other patients acquire the tightness from being in a cast, being on crutches or frequently wearing high-heeled shoes. In addition, diabetes can affect the fibers of the Achilles tendon and cause tightness. Sometimes equinus is related to a bone blocking the ankle motion. For example, a fragment of a broken bone following an ankle injury, or bone block, can get in the way and restrict motion. Equinus may also result from one leg being shorter than the other. Less often, equinus is caused by spasms in the calf muscle. These spasms may be signs of an underlying neurologic disorder.      Foot Problems Related to Equinus  Depending on how a patient compensates for the inability to bend properly at the ankle, a variety of foot conditions can develop, including:    Plantar fasciitis (arch/heel pain)  Calf cramping  Tendonitis (inflammation in the Achilles tendon)  Metatarsalgia (pain and/or callusing on the ball of the foot)  Flatfoot  Arthritis of the midfoot (middle area of the foot)  Pressure sores on the ball of the foot or the arch  Bunions and hammertoes  Ankle pain  Shin splints     Diagnosis  Most patients with equinus are unaware they have this condition when they first visit the doctor. Instead, they come to the doctor seeking relief for foot problems associated with equinus.    To diagnose equinus, the foot and ankle surgeon will evaluate the ankle's range of motion when the knee is flexed (bent) as well as extended (straightened). This enables the surgeon to identify whether the tendon or muscle is tight and to assess whether bone is interfering with ankle motion. X-rays may also be ordered. In some cases, the foot and ankle surgeon may refer the  patient for neurologic evaluation.    Nonsurgical Treatment  Treatment includes strategies aimed at relieving the symptoms and conditions associated with equinus. In addition, the patient is treated for the equinus itself through one or more of the following options:    Night splint. The foot may be placed in a splint at night to keep it in a position that helps reduce tightness of the calf muscle.  Heel lifts. Placing heel lifts inside the shoes or wearing shoes with a moderate heel takes stress off the Achilles tendon when walking and may reduce symptoms.  Arch supports or orthotic devices. Custom orthotic devices that fit into the shoe are often prescribed to keep weight distributed properly and to help control muscle/tendon imbalance.  Physical therapy. To help remedy muscle tightness, exercises that stretch the calf muscle(s) are recommended.     When Is Surgery Needed?  In some cases, surgery may be needed to correct the cause of equinus if it is related to a tight tendon or a bone blocking the ankle motion. The foot and ankle surgeon will determine the type of procedure that is best suited to the individual patient.                Ankle Dorsiflexion/Plantarflexion (Flexibility)    Sit on the floor or in bed with your legs straight in front of you.  Point both feet. Then flex both feet.  Do this 10 to 30 times in a row.  Repeat this exercise 2 times a day, or as instructed.  Date Last Reviewed: 5/1/2016 © 2000-2016 [employer]. [address] [place] [address]. All rights reserved. This information is not intended as a substitute for professional medical care. Always follow your healthcare professional's instructions.          Arch retraining    These exercises are for your right foot. Switch sides for your left foot.  Sit in a chair or stand with both feet flat on the floor. Press down with the ball of your right foot, but only on the left side of the foot, just under the big  toe.  Then pull the bottom of your big toe back toward your heel. This should pull up the arch of your foot. Dont flex your toes while doing this. It is a subtle movement of the arch.  Hold for 5 seconds. Relax.  Date Last Reviewed: 3/10/2016  © 2246-7959 [employer]. [address] [place] [address]. All rights reserved. This information is not intended as a substitute for professional medical care. Always follow your healthcare professional's instructions.        Soleus Stretch (Flexibility)    Stand facing a wall from 3 feet away. Take one step toward the wall with your right foot.  Place both palms on the wall. Bend both knees and lean forward. Keep both heels on the floor.  Hold for 30 to 60 seconds. Then relax both legs. Repeat the exercise 2 times.  Switch legs and repeat.  Repeat this exercise 3 times a day, or as instructed.     Tip: Dont bounce while youre stretching.   Date Last Reviewed: 3/10/2016  © 1778-1291 [employer]. [address] [place] [address]. All rights reserved. This information is not intended as a substitute for professional medical care. Always follow your healthcare professional's instructions.          Recommended OTC orthotics:  -powerstep  -superfeet    Recommended shoegear:  -new balance  -ascics  -mohsen rowe

## 2025-01-06 ENCOUNTER — HOSPITAL ENCOUNTER (OUTPATIENT)
Dept: RADIOLOGY | Facility: HOSPITAL | Age: 45
Discharge: HOME | End: 2025-01-06
Attending: NURSE PRACTITIONER
Payer: MEDICAID

## 2025-01-06 VITALS — WEIGHT: 152 LBS | BODY MASS INDEX: 25.95 KG/M2 | HEIGHT: 64 IN

## 2025-01-06 DIAGNOSIS — Z12.31 ENCOUNTER FOR SCREENING MAMMOGRAM FOR MALIGNANT NEOPLASM OF BREAST: ICD-10-CM

## 2025-01-06 PROCEDURE — 77063 BREAST TOMOSYNTHESIS BI: CPT | Mod: 26,,, | Performed by: RADIOLOGY

## 2025-01-06 PROCEDURE — 77063 BREAST TOMOSYNTHESIS BI: CPT | Mod: TC

## 2025-01-06 PROCEDURE — 77067 SCR MAMMO BI INCL CAD: CPT | Mod: 26,,, | Performed by: RADIOLOGY

## 2025-01-17 ENCOUNTER — OFFICE VISIT (OUTPATIENT)
Dept: OBSTETRICS AND GYNECOLOGY | Facility: CLINIC | Age: 45
End: 2025-01-17
Payer: MEDICAID

## 2025-01-17 VITALS
HEIGHT: 64 IN | HEART RATE: 82 BPM | SYSTOLIC BLOOD PRESSURE: 130 MMHG | WEIGHT: 140.31 LBS | BODY MASS INDEX: 23.95 KG/M2 | DIASTOLIC BLOOD PRESSURE: 88 MMHG

## 2025-01-17 DIAGNOSIS — A63.0 GENITAL WARTS: Primary | ICD-10-CM

## 2025-01-17 PROCEDURE — 3008F BODY MASS INDEX DOCD: CPT | Mod: CPTII,,, | Performed by: OBSTETRICS & GYNECOLOGY

## 2025-01-17 PROCEDURE — 1159F MED LIST DOCD IN RCRD: CPT | Mod: CPTII,,, | Performed by: OBSTETRICS & GYNECOLOGY

## 2025-01-17 PROCEDURE — 3075F SYST BP GE 130 - 139MM HG: CPT | Mod: CPTII,,, | Performed by: OBSTETRICS & GYNECOLOGY

## 2025-01-17 PROCEDURE — 99999 PR PBB SHADOW E&M-EST. PATIENT-LVL III: CPT | Mod: PBBFAC,,, | Performed by: OBSTETRICS & GYNECOLOGY

## 2025-01-17 PROCEDURE — 99213 OFFICE O/P EST LOW 20 MIN: CPT | Mod: S$PBB,,, | Performed by: OBSTETRICS & GYNECOLOGY

## 2025-01-17 PROCEDURE — 3079F DIAST BP 80-89 MM HG: CPT | Mod: CPTII,,, | Performed by: OBSTETRICS & GYNECOLOGY

## 2025-01-17 PROCEDURE — 99213 OFFICE O/P EST LOW 20 MIN: CPT | Mod: PBBFAC | Performed by: OBSTETRICS & GYNECOLOGY

## 2025-01-17 NOTE — Clinical Note
LEEP scheduled with annual on 2/7/2025. Patient aware awaiting LEEP machine which may cause procedure to be pushed back.

## 2025-01-17 NOTE — PROGRESS NOTES
Subjective:    Patient ID: Judie Schmitz is a 44 y.o. y.o. female.     Chief Complaint:   Chief Complaint   Patient presents with    Lump      Bumps near bottom of vaginal opening       History of Present Illness:  Judie presents today complaining of lumps present at her vaginal introitus. She reports they are at 5 and 7 oclock. She feels they have increased in size. Unable to express any material or drainage. No pain or tenderness.       ROS:   Review of Systems   Constitutional:  Negative for activity change, appetite change, chills, diaphoresis, fatigue, fever and unexpected weight change.   HENT:  Negative for mouth sores and tinnitus.    Eyes:  Negative for discharge and visual disturbance.   Respiratory:  Negative for cough, shortness of breath and wheezing.    Cardiovascular:  Negative for chest pain, palpitations and leg swelling.   Gastrointestinal:  Negative for abdominal pain, bloating, blood in stool, constipation, diarrhea, nausea and vomiting.   Endocrine: Negative for diabetes, hair loss, hot flashes, hyperthyroidism and hypothyroidism.   Genitourinary:  Negative for dysuria, flank pain, frequency, genital sores, hematuria, urgency, vaginal bleeding, vaginal discharge, vaginal pain, postcoital bleeding and vaginal odor.        Vulvar lesions   Musculoskeletal:  Negative for back pain, joint swelling and myalgias.   Integumentary:  Negative for rash, acne, breast mass, nipple discharge and breast skin changes.   Neurological:  Negative for seizures, syncope, numbness and headaches.   Hematological:  Negative for adenopathy. Does not bruise/bleed easily.   Psychiatric/Behavioral:  Negative for depression and sleep disturbance. The patient is not nervous/anxious.    Breast: Negative for mass, mastodynia, nipple discharge and skin changes          Objective:    Vital Signs:  Vitals:    01/17/25 1529   BP: 130/88   Pulse: 82       Physical Exam:  General:  alert, cooperative, no distress    Head Normocephalic, without obvious abnormality, atraumatic   Neck .supple, symmetrical, trachea midline   Skin:  Skin color, texture, turgor normal. No rashes or lesions   Abdomen:  soft, non-tender; bowel sounds normal   Pelvis: External genitalia: normal general appearance, warts present at clitoral chan and vaginal introitus      Extremities extremities normal, atraumatic, no cyanosis or edema   Neurologic Grossly normal      Genital warts      HPV vaccine series  RTC for wart removal

## 2025-02-10 ENCOUNTER — PATIENT MESSAGE (OUTPATIENT)
Dept: OBSTETRICS AND GYNECOLOGY | Facility: CLINIC | Age: 45
End: 2025-02-10
Payer: MEDICAID

## 2025-03-04 ENCOUNTER — TELEPHONE (OUTPATIENT)
Dept: FAMILY MEDICINE | Facility: CLINIC | Age: 45
End: 2025-03-04
Payer: MEDICAID

## 2025-03-04 DIAGNOSIS — Z13.220 SCREENING CHOLESTEROL LEVEL: ICD-10-CM

## 2025-03-04 DIAGNOSIS — Z00.00 ENCOUNTER FOR BLOOD TEST FOR ROUTINE GENERAL PHYSICAL EXAMINATION: Primary | ICD-10-CM

## 2025-03-04 DIAGNOSIS — Z13.0 SCREENING FOR DEFICIENCY ANEMIA: ICD-10-CM

## 2025-03-04 DIAGNOSIS — Z13.1 DIABETES MELLITUS SCREENING: ICD-10-CM

## 2025-03-04 DIAGNOSIS — Z13.29 THYROID DISORDER SCREEN: ICD-10-CM

## 2025-03-04 NOTE — TELEPHONE ENCOUNTER
----- Message from Ольга Cesar NP sent at 4/25/2024  6:01 PM CDT -----  Regarding: wellness  F. Labs and wellness due 4/25/25

## 2025-03-11 ENCOUNTER — PATIENT MESSAGE (OUTPATIENT)
Dept: OBSTETRICS AND GYNECOLOGY | Facility: CLINIC | Age: 45
End: 2025-03-11
Payer: MEDICAID

## 2025-04-29 ENCOUNTER — OFFICE VISIT (OUTPATIENT)
Dept: FAMILY MEDICINE | Facility: CLINIC | Age: 45
End: 2025-04-29
Payer: MEDICAID

## 2025-04-29 VITALS
HEIGHT: 64 IN | WEIGHT: 135.25 LBS | OXYGEN SATURATION: 99 % | DIASTOLIC BLOOD PRESSURE: 78 MMHG | HEART RATE: 81 BPM | SYSTOLIC BLOOD PRESSURE: 130 MMHG | BODY MASS INDEX: 23.09 KG/M2 | TEMPERATURE: 99 F

## 2025-04-29 DIAGNOSIS — G47.00 INSOMNIA, UNSPECIFIED TYPE: ICD-10-CM

## 2025-04-29 DIAGNOSIS — I73.00 RAYNAUD'S DISEASE WITHOUT GANGRENE: ICD-10-CM

## 2025-04-29 DIAGNOSIS — F90.0 ADHD (ATTENTION DEFICIT HYPERACTIVITY DISORDER), INATTENTIVE TYPE: ICD-10-CM

## 2025-04-29 DIAGNOSIS — Z87.81 HISTORY OF COMPRESSION FRACTURE OF SPINE: ICD-10-CM

## 2025-04-29 DIAGNOSIS — F41.1 GAD (GENERALIZED ANXIETY DISORDER): ICD-10-CM

## 2025-04-29 DIAGNOSIS — M85.80 OSTEOPENIA, UNSPECIFIED LOCATION: ICD-10-CM

## 2025-04-29 DIAGNOSIS — M25.50 PAIN IN JOINT, MULTIPLE SITES: ICD-10-CM

## 2025-04-29 DIAGNOSIS — Z87.898 HISTORY OF ABNORMAL MAMMOGRAM: ICD-10-CM

## 2025-04-29 DIAGNOSIS — F12.90 MARIJUANA USE: ICD-10-CM

## 2025-04-29 DIAGNOSIS — Z82.61 FAMILY HISTORY OF RHEUMATOID ARTHRITIS: ICD-10-CM

## 2025-04-29 DIAGNOSIS — F11.20 OPIOID DEPENDENCE WITH CURRENT USE: ICD-10-CM

## 2025-04-29 DIAGNOSIS — Z00.00 ANNUAL PHYSICAL EXAM: Primary | ICD-10-CM

## 2025-04-29 DIAGNOSIS — Z86.39 HISTORY OF OBESITY: ICD-10-CM

## 2025-04-29 DIAGNOSIS — H04.123 DRY EYE SYNDROME OF BOTH EYES: ICD-10-CM

## 2025-04-29 DIAGNOSIS — K58.0 IRRITABLE BOWEL SYNDROME WITH DIARRHEA: ICD-10-CM

## 2025-04-29 DIAGNOSIS — G89.29 CHRONIC MIDLINE LOW BACK PAIN WITHOUT SCIATICA: ICD-10-CM

## 2025-04-29 DIAGNOSIS — M54.50 CHRONIC MIDLINE LOW BACK PAIN WITHOUT SCIATICA: ICD-10-CM

## 2025-04-29 PROBLEM — R92.8 ABNORMAL MAMMOGRAM: Status: RESOLVED | Noted: 2024-04-25 | Resolved: 2025-04-29

## 2025-04-29 PROBLEM — R03.0 TEMPORARY HIGH BLOOD PRESSURE: Status: RESOLVED | Noted: 2023-01-30 | Resolved: 2025-04-29

## 2025-04-29 PROCEDURE — 99999 PR PBB SHADOW E&M-EST. PATIENT-LVL IV: CPT | Mod: PBBFAC,,, | Performed by: NURSE PRACTITIONER

## 2025-04-29 PROCEDURE — 99214 OFFICE O/P EST MOD 30 MIN: CPT | Mod: PBBFAC,PN | Performed by: NURSE PRACTITIONER

## 2025-04-29 RX ORDER — OXYCODONE AND ACETAMINOPHEN 7.5; 325 MG/1; MG/1
1 TABLET ORAL
COMMUNITY
Start: 2025-04-25

## 2025-04-29 NOTE — PROGRESS NOTES
Subjective:       Patient ID: Judie Schmitz is a 44 y.o. female.    Chief Complaint: Joint Swelling (Patient report having a lot joints pains will like to be check for Arthritis), Annual Exam, and Dry Eye (Told by eye doctor)        HPI WITH ASSESSMENT AND PLAN OF CARE:      Patient is a 44-year-old white female with ADHD, Generalized Anxiety Disorder, chronic intermittent low back pain, excessive sweating, IBS, Raynaud Disease followed by [facility], Insomnia followed by [facility], and compression fracture to T12 Vertebra followed by Chiropractor that is here today for ANNUAL PHYSICAL EXAM with fasting lab results. Patient has additional complaint of increase joint pains multiple sites with family history of RA. Will address complaint in same progress note as wellness exam below.  Patient did not have fasting labs for wellness exam so will schedule for this coming week.         ADHD   Followed by Psychiatry Ray Pregeant  ON Adderall 20 mg - 30 mg in AM and 20 mg mid-day  Stable on current medication  Last filled on 4/25/2025        Generalized Anxiety Disorder  On Medical Marijuana prescribed by Dr. Yelena Acosta         Insomnia  On Ambien 10 mg daily now prescribed by Psychiatry  Pregeant - last filled on 3/21/2025         IBS  WAS on Amitriptyline 50 mg at bedtime by Dr. Hood - STOPPED taking due to side effects of constipation and brain fog.  On Bentyl prn  Stable.      Raynaud Disease   followed by [facility] IN 2019  Stable.      History of Compression Fracture T12 vertebra and Chronic Back Pain  Fall occurred on 10/22/2022  Fell while roller skating at daughter's birthday party  Patient was followed by [facility] and Chiropractor.  Now followed by [facility]es - physical medicine and rehab  Prescribed Oxycodone 7.5/325 mg #90 prescribed by Dr. Dylan Mckinney  Also has medical marijuana reported for anxiety and chronic pain     Opioid  Dependence - daily use  Prescribed Oxycodone 7.5/325 mg #90 prescribed by Dr. Dylan Mckinney  Also has medical marijuana reported for anxiety and chronic pain        History of Obesity  Body mass index is 23.22 kg/m².  Followed by [facility] program - started Semaglutide injections in September 2023  OFF injections for 1 year and maintaining weight loss  Stable.         History of Abnormal Mammogram  2/3/2023 -   Right  There is an asymmetry seen in the inner region of the right breast in the posterior depth on the CC view. This is a new finding.   Diagnostic mammogram and ultrasound was ordered but patient was noncompliant with follow up.  She is now DUE TO REPEAT YEARLY MAMMOGRAM - states she want to have yearly screening and if still abnormality is present - then she agrees to do diagnostic mammogram and ultrasound  1/6/2025 mammo:  NEGATIVE - repeat in 1 year.      Osteopenia  DEXA SCAN 7/11/2023:  The L1 to L4 vertebral bone mineral density is equal to 0.962 g/cm squared with a T score of -1.9.  The left femoral neck bone mineral density is equal to 0.935 g/cm squared with a T score of -0.7.   There is a 5.0% risk of a major osteoporotic fracture and a 0.4% risk of hip fracture in the next 10 years (FRAX).   Impression:  Osteopenia  Advised to start Calcium with Vitamin D supplement  Repeat DEXA July 2026       ADDITIONAL COMPLAINT:    MULTIPLE JOINT PAINS  Patient has known degenerative changes to cervical, thoracic and lumbar spine being followed by Stephen Reveles - physical medicine and rehab  Patient also reports having intermittent joint pains to shoulder, knee, hands, toes, etc  Family history of Rheumatoid Arthritis in Mother  Patient has Raynaud's disease  Last seen Rheumatology in 2019  Requesting RA labs - will get KARLEE, RF, and CCP - if positive - will refer rheumatology      Dry Eye Syndrome  Reports intermittent smoky vision  Went to eye doctor and told she has dry eye syndrome  Will get  "workup for Sjorgen's syndrome    Health Maintenance:  FASTING LABS THIS WEEK - will send message with results.  Mammogram up to date  PAP done 5/18/2021 - OVERDUE - patient will schedule  Declined flu and covid vaccines         Vitals:    04/29/25 1334   BP: 130/78   BP Location: Left arm   Patient Position: Sitting   Pulse: 81   Temp: 98.6 °F (37 °C)   TempSrc: Temporal   SpO2: 99%   Weight: 61.4 kg (135 lb 4 oz)   Height: 5' 4" (1.626 m)         Diagnoses this Encounter:         ICD-10-CM ICD-9-CM   1. Annual physical exam  Z00.00 V70.0   2. ADHD (attention deficit hyperactivity disorder), inattentive type  F90.0 314.00   3. IVANA (generalized anxiety disorder)  F41.1 300.02   4. Insomnia, unspecified type  G47.00 780.52   5. Marijuana use  F12.90 305.20   6. Opioid dependence with current use  F11.20 304.00   7. Chronic midline low back pain without sciatica  M54.50 724.2    G89.29 338.29   8. History of compression fracture of spine  Z87.81 V15.51   9. Irritable bowel syndrome with diarrhea  K58.0 564.1   10. History of obesity  Z86.39 V12.29   11. History of abnormal mammogram  Z87.898 V15.89   12. Osteopenia, unspecified location  M85.80 733.90   13. Raynaud's disease without gangrene  I73.00 443.0   14. Pain in joint, multiple sites  M25.50 719.49   15. Family history of rheumatoid arthritis  Z82.61 V17.7   16. Dry eye syndrome of both eyes  H04.123 375.15       Orders Placed This Encounter    Rheumatoid Factor    Sjorgen's Ab, Anti-SSA/SSB    KARLEE IFA Screen With Reflex To Titer and Pattern    Cyclic Citrullinated Peptide Antibody, IgG        Follow up for pending lab results.     Patient's Medications   New Prescriptions    No medications on file   Previous Medications    ASCORBIC ACID, VITAMIN C, (VITAMIN C) 1000 MG TABLET    Take 1,000 mg by mouth once daily.    AZELASTINE (ASTELIN) 137 MCG (0.1 %) NASAL SPRAY    1 spray (137 mcg total) by Nasal route 2 (two) times daily.    DEXTROAMPHETAMINE-AMPHETAMINE " (ADDERALL) 20 MG TABLET    One and a half tablets by mouth in the morning and one tablet by mouth in the afternoon    DICYCLOMINE (BENTYL) 10 MG CAPSULE    Take 1 capsule (10 mg total) by mouth 4 (four) times daily as needed.    FLUTICASONE PROPIONATE (FLONASE) 50 MCG/ACTUATION NASAL SPRAY    1 spray (50 mcg total) by Each Nostril route once daily.    LIDOCAINE (LIDODERM) 5 %    Place 1 patch onto the skin once daily. Remove & Discard patch within 12 hours or as directed by MD    MAGNESIUM ORAL    Take by mouth.    MV-MIN/IRON/FOLIC/CALCIUM/VITK (WOMEN'S MULTIVITAMIN ORAL)    Take by mouth.    OXYCODONE-ACETAMINOPHEN (PERCOCET) 7.5-325 MG PER TABLET    Take 1 tablet by mouth.    TIZANIDINE (ZANAFLEX) 4 MG TABLET    Take 1 tablet by mouth every 8 (eight) hours as needed (muscle spasms)    UNABLE TO FIND    medication name: MEDICAL MARIJUANA - PINEAPPLE CANNABIS INFUSE,    ZOLPIDEM (AMBIEN) 10 MG TAB    TAKE ONE TABLET BY MOUTH NIGHTLY BEFORE BEDTIME AS NEEDED FOR INSOMNIA   Modified Medications    No medications on file   Discontinued Medications    BROMPHENIRAMINE-PSEUDOEPH-DM (BROMFED DM) 2-30-10 MG/5 ML SYRP    Take 10 mLs by mouth every 4 (four) hours as needed (congestion/cough).         Review of Systems   Eyes:  Positive for visual disturbance.   Musculoskeletal:  Positive for arthralgias, back pain, joint swelling and myalgias.         Objective:        Physical Exam  Constitutional:       General: She is not in acute distress.     Appearance: Normal appearance. She is well-developed and normal weight. She is not toxic-appearing or diaphoretic.      Comments: Body mass index is 23.22 kg/m².     HENT:      Head: Normocephalic and atraumatic.      Right Ear: Tympanic membrane, ear canal and external ear normal.      Left Ear: Tympanic membrane, ear canal and external ear normal.      Nose: Nose normal. No congestion or rhinorrhea.      Mouth/Throat:      Pharynx: No oropharyngeal exudate or posterior  oropharyngeal erythema.   Eyes:      Extraocular Movements: Extraocular movements intact.      Conjunctiva/sclera: Conjunctivae normal.   Neck:      Thyroid: No thyromegaly.      Trachea: No tracheal deviation.   Cardiovascular:      Rate and Rhythm: Normal rate and regular rhythm.      Heart sounds: Normal heart sounds. No murmur heard.  Pulmonary:      Effort: Pulmonary effort is normal. No respiratory distress.      Breath sounds: Normal breath sounds.   Abdominal:      General: Bowel sounds are normal. There is no distension.      Palpations: Abdomen is soft. There is no mass.      Tenderness: There is no guarding.   Musculoskeletal:         General: Normal range of motion.      Cervical back: Normal range of motion and neck supple.      Right lower leg: No edema.      Left lower leg: No edema.   Lymphadenopathy:      Cervical: No cervical adenopathy.   Skin:     General: Skin is warm and dry.      Findings: No rash.   Neurological:      Mental Status: She is alert and oriented to person, place, and time.      Cranial Nerves: No cranial nerve deficit.      Coordination: Coordination normal.   Psychiatric:         Mood and Affect: Mood normal.         Behavior: Behavior normal.             Past Medical History:   Diagnosis Date    Abnormal Pap smear of cervix age 34    no treatment; repeat PAPs normal    ADHD (attention deficit hyperactivity disorder), inattentive type 09/13/2007    diagnosed by Dr. Denny - taking Adderall 10 mg twice daily from 9/2007 to 2009 - increased Adderall to 20 mg twice daily until 2011 - patient quit school and got off med - started back on Adderall 10 in 2012 but then off med when had baby in 2013 - has not been on med since having baby in 2013.    Anxiety and depression     Compression fracture of T12 vertebra 10/22/2022    followed by Chiropractor, Stephen Reveles and Orthopedic MD    IVANA (generalized anxiety disorder) 06/17/2021    [facility]    IBS (irritable bowel syndrome)     Insomnia     Osteopenia 2024    DEXA SCAN 2023:  The L1 to L4 vertebral bone mineral density is equal to 0.962 g/cm squared with a T score of -1.9.  The left femoral neck bone mineral density is equal to 0.935 g/cm squared with a T score of -0.7.   There is a 5.0% risk of a major osteoporotic fracture and a 0.4% risk of hip fracture in the next 10 years (FRAX).   Impression:  Osteopenia  Advised to start Calcium with Vitami    Raynaud's disease     followed by Rheumatology Dr. Villavicencio       Past Surgical History:   Procedure Laterality Date    ADENOIDECTOMY      APPENDECTOMY       SECTION      x2 2002-10/21/2013    COLONOSCOPY N/A 2022    Procedure: COLONOSCOPY;  Surgeon: Cheryl Hood MD;  Location: [facility];  Service: Endoscopy;  Laterality: N/A;    HYSTEROSCOPY WITH DILATION AND CURETTAGE OF UTERUS N/A 2022    Procedure: HYSTEROSCOPY, WITH DILATION AND CURETTAGE OF UTERUS;  Surgeon: Jigar Denny MD;  Location: [facility];  Service: OB/GYN;  Laterality: N/A;    LAPAROSCOPIC APPENDECTOMY N/A 2019    Procedure: APPENDECTOMY, LAPAROSCOPIC (ADD ON );  Surgeon: Erasto Hendricks MD;  [facility];  Service: General;  Laterality: N/A;  (ADD ON )    RADIOFREQUENCY ABLATION, NERVE, SPINAL, LUMBOSACRAL  2024    THERMAL ABLATION OF ENDOMETRIUM N/A 2022    Procedure: ABLATION, ENDOMETRIUM, THERMAL (NOVASURE);  Surgeon: Jigar Denny MD;  Location: [facility];  Service: OB/GYN;  Laterality: N/A;       Family History   Problem Relation Name Age of Onset    Hypertension Mother Mom     COPD Mother Mom 50    Rheum arthritis Mother Mom     Arthritis Mother Mom     Colon polyps Father Father     Cancer Father Father 64        prostate cancer    Stroke Maternal Grandmother Gma     Pneumonia Maternal Grandmother Gma         passed age 88    Cancer Maternal Grandfather Dads dad         brain tumor dont know  what age he passed    Suicide Paternal Grandfather          passed in his 50's    No Known Problems Sister      Montoya syndrome Sister Sister     Heart disease Sister Sister     Thyroid disease Sister Sister     Breast cancer Paternal Aunt      Colon cancer Neg Hx      Ovarian cancer Neg Hx         Social History     Socioeconomic History    Marital status:     Number of children: 2   Occupational History    Occupation:    Tobacco Use    Smoking status: Never    Smokeless tobacco: Never   Substance and Sexual Activity    Alcohol use: Not Currently     Comment: occasional    Drug use: No    Sexual activity: Yes     Partners: Male     Comment:    Social History Narrative    Lives in [place].

## 2025-05-10 ENCOUNTER — RESULTS FOLLOW-UP (OUTPATIENT)
Dept: FAMILY MEDICINE | Facility: CLINIC | Age: 45
End: 2025-05-10

## 2025-05-10 DIAGNOSIS — R76.8 POSITIVE ANA (ANTINUCLEAR ANTIBODY): Primary | ICD-10-CM

## 2025-05-10 DIAGNOSIS — M25.50 PAIN IN JOINT, MULTIPLE SITES: ICD-10-CM

## 2025-05-10 NOTE — PROGRESS NOTES
All wellness labs okay  Your auto-immune labs - your KARLEE was MINIMALLY positive 1:80 but all the rest of auto-immune labs are negative.  We can refer you to rheumatology if you would like OR we can repeat KARLEE in 6 months to see if it is increasing.  Let me know what you decide.

## (undated) DEVICE — ADHESIVE DERMABOND ADVANCED

## (undated) DEVICE — DRESSING LEUKOPLAST FLEX 1X3IN

## (undated) DEVICE — SEE MEDLINE ITEM 157018

## (undated) DEVICE — IRRIGATOR ENDOSCOPY DISP.

## (undated) DEVICE — SUT VICRYL+ 2-0 UR6 27 VIOL

## (undated) DEVICE — SCRUB HIBICLENS 4% CHG 4OZ

## (undated) DEVICE — CART STAPLE FLEX ETX 3.5MM BLU

## (undated) DEVICE — NDL INSUF ULTRA VERESS 120MM

## (undated) DEVICE — CART STAPLE RELD 45MM WHT

## (undated) DEVICE — SOL CLEARIFY VISUALIZATION LAP

## (undated) DEVICE — GLOVE BIOGEL SKINSENSE PI 8.0

## (undated) DEVICE — ELECTRODE REM PLYHSV RETURN 9

## (undated) DEVICE — STRIP STERI REIN CLSR 1/2X2IN

## (undated) DEVICE — STAPLER INT LINEAR ARTC 3.5-45

## (undated) DEVICE — ADHESIVE SURG LIQ 2 OZ

## (undated) DEVICE — SOL 9P NACL IRR PIC IL

## (undated) DEVICE — GOWN SMART IMP BREATHABLE XXLG

## (undated) DEVICE — TROCAR ENDOPATH XCEL 12X100MM

## (undated) DEVICE — DEVICE ABLATION NOVASURE DISP

## (undated) DEVICE — SEE MEDLINE ITEM 156925

## (undated) DEVICE — DRESSING TELFA N ADH 3X8

## (undated) DEVICE — CANNULA ENDOPATH XCEL 5X100MM

## (undated) DEVICE — TROCAR ENDOPATH XCEL 5X100MM

## (undated) DEVICE — BAG TISSUE RETRIEVAL 225ML

## (undated) DEVICE — GLOVE PROTEXIS LTX MICRO  7.5

## (undated) DEVICE — TRAY URETHRAL CATH 14FR KC3410

## (undated) DEVICE — SOL NS 1000CC

## (undated) DEVICE — SHEARS HARMONIC 36CM HD 1000I

## (undated) DEVICE — SUT VICRYL PLUS 4-0 P3 18IN